# Patient Record
Sex: FEMALE | Race: WHITE | NOT HISPANIC OR LATINO | Employment: FULL TIME | URBAN - METROPOLITAN AREA
[De-identification: names, ages, dates, MRNs, and addresses within clinical notes are randomized per-mention and may not be internally consistent; named-entity substitution may affect disease eponyms.]

---

## 2021-01-01 ENCOUNTER — TRANSFERRED RECORDS (OUTPATIENT)
Dept: MULTI SPECIALTY CLINIC | Facility: CLINIC | Age: 46
End: 2021-01-01

## 2021-11-09 ENCOUNTER — OFFICE VISIT (OUTPATIENT)
Dept: FAMILY MEDICINE | Facility: CLINIC | Age: 46
End: 2021-11-09
Payer: COMMERCIAL

## 2021-11-09 VITALS
SYSTOLIC BLOOD PRESSURE: 122 MMHG | HEIGHT: 66 IN | DIASTOLIC BLOOD PRESSURE: 64 MMHG | HEART RATE: 76 BPM | TEMPERATURE: 98 F | OXYGEN SATURATION: 98 % | BODY MASS INDEX: 37.56 KG/M2 | WEIGHT: 233.69 LBS

## 2021-11-09 DIAGNOSIS — E66.9 OBESITY (BMI 35.0-39.9 WITHOUT COMORBIDITY): ICD-10-CM

## 2021-11-09 DIAGNOSIS — Z00.00 WELLNESS EXAMINATION: Primary | ICD-10-CM

## 2021-11-09 DIAGNOSIS — Z80.0 FAMILY HISTORY OF COLON CANCER: ICD-10-CM

## 2021-11-09 DIAGNOSIS — Z11.59 ENCOUNTER FOR HEPATITIS C SCREENING TEST FOR LOW RISK PATIENT: ICD-10-CM

## 2021-11-09 DIAGNOSIS — F33.0 MAJOR DEPRESSIVE DISORDER, RECURRENT EPISODE, MILD: ICD-10-CM

## 2021-11-09 DIAGNOSIS — Z85.828 HISTORY OF BASAL CELL CANCER: ICD-10-CM

## 2021-11-09 DIAGNOSIS — R06.81 WITNESSED EPISODE OF APNEA: ICD-10-CM

## 2021-11-09 DIAGNOSIS — Z11.4 ENCOUNTER FOR SCREENING FOR HIV: ICD-10-CM

## 2021-11-09 DIAGNOSIS — Z12.31 ENCOUNTER FOR SCREENING MAMMOGRAM FOR MALIGNANT NEOPLASM OF BREAST: ICD-10-CM

## 2021-11-09 PROCEDURE — 99999 PR PBB SHADOW E&M-EST. PATIENT-LVL IV: ICD-10-PCS | Mod: PBBFAC,,, | Performed by: INTERNAL MEDICINE

## 2021-11-09 PROCEDURE — 99386 PREV VISIT NEW AGE 40-64: CPT | Mod: S$GLB,,, | Performed by: INTERNAL MEDICINE

## 2021-11-09 PROCEDURE — 99386 PR PREVENTIVE VISIT,NEW,40-64: ICD-10-PCS | Mod: S$GLB,,, | Performed by: INTERNAL MEDICINE

## 2021-11-09 PROCEDURE — 99999 PR PBB SHADOW E&M-EST. PATIENT-LVL IV: CPT | Mod: PBBFAC,,, | Performed by: INTERNAL MEDICINE

## 2021-11-09 RX ORDER — ESCITALOPRAM OXALATE 20 MG/1
20 TABLET ORAL DAILY
Qty: 90 TABLET | Refills: 1 | Status: SHIPPED | OUTPATIENT
Start: 2021-11-09 | End: 2022-05-07

## 2021-11-09 RX ORDER — ESCITALOPRAM OXALATE 20 MG/1
1 TABLET ORAL DAILY
COMMUNITY
End: 2021-11-09 | Stop reason: SDUPTHER

## 2021-11-16 ENCOUNTER — OFFICE VISIT (OUTPATIENT)
Dept: DERMATOLOGY | Facility: CLINIC | Age: 46
End: 2021-11-16
Payer: COMMERCIAL

## 2021-11-16 VITALS — BODY MASS INDEX: 37.61 KG/M2 | WEIGHT: 233 LBS

## 2021-11-16 DIAGNOSIS — D22.9 NEVUS OF MULTIPLE SITES: ICD-10-CM

## 2021-11-16 DIAGNOSIS — L81.4 LENTIGINES: ICD-10-CM

## 2021-11-16 DIAGNOSIS — L82.1 STUCCO KERATOSES: Primary | ICD-10-CM

## 2021-11-16 DIAGNOSIS — Z85.828 HISTORY OF BASAL CELL CANCER: ICD-10-CM

## 2021-11-16 DIAGNOSIS — L71.9 ROSACEA: ICD-10-CM

## 2021-11-16 PROCEDURE — 99999 PR PBB SHADOW E&M-EST. PATIENT-LVL III: CPT | Mod: PBBFAC,,, | Performed by: DERMATOLOGY

## 2021-11-16 PROCEDURE — 99999 PR PBB SHADOW E&M-EST. PATIENT-LVL III: ICD-10-PCS | Mod: PBBFAC,,, | Performed by: DERMATOLOGY

## 2021-11-16 PROCEDURE — 99203 PR OFFICE/OUTPT VISIT, NEW, LEVL III, 30-44 MIN: ICD-10-PCS | Mod: S$GLB,,, | Performed by: DERMATOLOGY

## 2021-11-16 PROCEDURE — 99203 OFFICE O/P NEW LOW 30 MIN: CPT | Mod: S$GLB,,, | Performed by: DERMATOLOGY

## 2021-11-16 RX ORDER — METRONIDAZOLE 7.5 MG/G
GEL TOPICAL
Qty: 45 G | Refills: 3 | Status: SHIPPED | OUTPATIENT
Start: 2021-11-16 | End: 2022-09-16 | Stop reason: ALTCHOICE

## 2021-11-16 RX ORDER — AMMONIUM LACTATE 12 G/100G
LOTION TOPICAL
Qty: 225 G | Refills: 3 | Status: SHIPPED | OUTPATIENT
Start: 2021-11-16 | End: 2023-01-17

## 2021-11-24 ENCOUNTER — PATIENT MESSAGE (OUTPATIENT)
Dept: GASTROENTEROLOGY | Facility: CLINIC | Age: 46
End: 2021-11-24
Payer: COMMERCIAL

## 2021-11-24 ENCOUNTER — TELEPHONE (OUTPATIENT)
Dept: GASTROENTEROLOGY | Facility: CLINIC | Age: 46
End: 2021-11-24
Payer: COMMERCIAL

## 2021-11-24 DIAGNOSIS — Z01.818 PREOP EXAMINATION: Primary | ICD-10-CM

## 2021-11-24 RX ORDER — SODIUM PICOSULFATE, MAGNESIUM OXIDE, AND ANHYDROUS CITRIC ACID 10; 3.5; 12 MG/160ML; G/160ML; G/160ML
LIQUID ORAL
Qty: 1 EACH | Refills: 0 | Status: SHIPPED | OUTPATIENT
Start: 2021-11-24 | End: 2022-08-22

## 2021-12-14 ENCOUNTER — TELEPHONE (OUTPATIENT)
Dept: GASTROENTEROLOGY | Facility: CLINIC | Age: 46
End: 2021-12-14
Payer: COMMERCIAL

## 2021-12-27 ENCOUNTER — OFFICE VISIT (OUTPATIENT)
Dept: SLEEP MEDICINE | Facility: CLINIC | Age: 46
End: 2021-12-27
Payer: COMMERCIAL

## 2021-12-27 VITALS
WEIGHT: 237.81 LBS | HEIGHT: 66 IN | SYSTOLIC BLOOD PRESSURE: 148 MMHG | BODY MASS INDEX: 38.22 KG/M2 | HEART RATE: 60 BPM | DIASTOLIC BLOOD PRESSURE: 82 MMHG

## 2021-12-27 DIAGNOSIS — G47.30 SLEEP APNEA, UNSPECIFIED TYPE: Primary | ICD-10-CM

## 2021-12-27 DIAGNOSIS — R06.81 WITNESSED EPISODE OF APNEA: ICD-10-CM

## 2021-12-27 PROCEDURE — 99204 PR OFFICE/OUTPT VISIT, NEW, LEVL IV, 45-59 MIN: ICD-10-PCS | Mod: S$GLB,,, | Performed by: PSYCHIATRY & NEUROLOGY

## 2021-12-27 PROCEDURE — 99999 PR PBB SHADOW E&M-EST. PATIENT-LVL III: ICD-10-PCS | Mod: PBBFAC,,, | Performed by: PSYCHIATRY & NEUROLOGY

## 2021-12-27 PROCEDURE — 99999 PR PBB SHADOW E&M-EST. PATIENT-LVL III: CPT | Mod: PBBFAC,,, | Performed by: PSYCHIATRY & NEUROLOGY

## 2021-12-27 PROCEDURE — 99204 OFFICE O/P NEW MOD 45 MIN: CPT | Mod: S$GLB,,, | Performed by: PSYCHIATRY & NEUROLOGY

## 2022-01-01 ENCOUNTER — TRANSFERRED RECORDS (OUTPATIENT)
Dept: MULTI SPECIALTY CLINIC | Facility: CLINIC | Age: 47
End: 2022-01-01

## 2022-01-10 ENCOUNTER — PATIENT MESSAGE (OUTPATIENT)
Dept: ADMINISTRATIVE | Facility: HOSPITAL | Age: 47
End: 2022-01-10
Payer: COMMERCIAL

## 2022-01-12 ENCOUNTER — PATIENT MESSAGE (OUTPATIENT)
Dept: SLEEP MEDICINE | Facility: CLINIC | Age: 47
End: 2022-01-12
Payer: COMMERCIAL

## 2022-01-13 ENCOUNTER — OFFICE VISIT (OUTPATIENT)
Dept: OBSTETRICS AND GYNECOLOGY | Facility: CLINIC | Age: 47
End: 2022-01-13
Payer: COMMERCIAL

## 2022-01-13 VITALS
SYSTOLIC BLOOD PRESSURE: 126 MMHG | DIASTOLIC BLOOD PRESSURE: 72 MMHG | BODY MASS INDEX: 38.86 KG/M2 | WEIGHT: 240.75 LBS

## 2022-01-13 DIAGNOSIS — Z12.4 SCREENING FOR CERVICAL CANCER: ICD-10-CM

## 2022-01-13 DIAGNOSIS — Z01.419 WELL WOMAN EXAM WITH ROUTINE GYNECOLOGICAL EXAM: Primary | ICD-10-CM

## 2022-01-13 PROCEDURE — 88175 CYTOPATH C/V AUTO FLUID REDO: CPT | Performed by: OBSTETRICS & GYNECOLOGY

## 2022-01-13 PROCEDURE — 99999 PR PBB SHADOW E&M-EST. PATIENT-LVL III: ICD-10-PCS | Mod: PBBFAC,,, | Performed by: OBSTETRICS & GYNECOLOGY

## 2022-01-13 PROCEDURE — 99999 PR PBB SHADOW E&M-EST. PATIENT-LVL III: CPT | Mod: PBBFAC,,, | Performed by: OBSTETRICS & GYNECOLOGY

## 2022-01-13 PROCEDURE — 99386 PREV VISIT NEW AGE 40-64: CPT | Mod: S$GLB,,, | Performed by: OBSTETRICS & GYNECOLOGY

## 2022-01-13 PROCEDURE — 99386 PR PREVENTIVE VISIT,NEW,40-64: ICD-10-PCS | Mod: S$GLB,,, | Performed by: OBSTETRICS & GYNECOLOGY

## 2022-01-13 NOTE — PROGRESS NOTES
GYNECOLOGY OFFICE NOTE    Reason for visit: annual    HPI: Pt is a 46 y.o.  female  who presents for annual. Cycle: menarche- 9,. Pt with hx of RA- supracervical hysterectomy/BS/sacrrocolpoplexy secondary uterine fibroids and prolapse.  She is sexually active. She does not desire STI screening. She denies vaginal discharge.  Last pap: , denies hx of abnormal. Last MMG 2022.     Past Medical History:   Diagnosis Date    Anxiety     Basal cell carcinoma        Past Surgical History:   Procedure Laterality Date    LUMBAR DISCECTOMY  2013    Robotic TLH/BS/sacrocolplexy  2020    [place]       Family History   Problem Relation Age of Onset    Colon cancer Mother     Diabetes Father     Breast cancer Paternal Grandmother     Ovarian cancer Neg Hx        Social History     Tobacco Use    Smoking status: Never Smoker    Smokeless tobacco: Never Used   Substance Use Topics    Alcohol use: Never    Drug use: Never       OB History    Para Term  AB Living   0 0 0 0 0 0   SAB IAB Ectopic Multiple Live Births   0 0 0 0 0       Current Outpatient Medications   Medication Sig    ammonium lactate (LAC-HYDRIN) 12 % lotion Apply topically as needed for Dry Skin.    EScitalopram oxalate (LEXAPRO) 20 MG tablet Take 1 tablet (20 mg total) by mouth once daily.    metroNIDAZOLE (METROGEL) 0.75 % gel Use hs on face    sod picosulf-mag ox-citric ac (CLENPIQ) 10 mg-3.5 gram -12 gram/160 mL Soln Use as directed     No current facility-administered medications for this visit.       Allergies: Iodinated contrast media     /72   Wt 109.2 kg (240 lb 11.9 oz)   BMI 38.86 kg/m²     ROS:  GENERAL: Denies fever or chills.   SKIN: Denies rash or lesions.   HEAD: Denies head injury or headache.   CHEST: Denies chest pain or shortness of breath.   CARDIOVASCULAR: Denies palpitations or chest pain.   ABDOMEN: No constipation, diarrhea, nausea, vomiting or rectal bleeding.   URINARY: No  dysuria, hematuria, or burning on urination.  REPRODUCTIVE: See HPI.   BREASTS: see HPI  NEUROLOGIC: Denies syncope or weakness.     Physical Exam:  GENERAL: alert, appears stated age and cooperative  NEUROLOGIC: orientated to person, place and time, normal mood and affect   CHEST: Normal respiratory effort  NECK: normal appearance  SKIN: no acne, hirsutism  BREAST EXAM: breasts appear normal, no suspicious masses, no skin or nipple changes or axillary nodes  ABDOMEN: abdomen is soft without significant tenderness, masses  EXTERNAL GENITALIA:  normal general appearance  URETHRA: normal urethra, normal urethral meatus  VAGINA:  normal mucosa, no  lesions  CERVIX:  Normal  UTERUS:  Surgically absent  ADNEXA: nontender    Diagnosis:  1. Well woman exam with routine gynecological exam    2. Screening for cervical cancer        Plan:   1. Annual  2. Pap today    Orders Placed This Encounter    Liquid-Based Pap Smear, Screening       Luisa Mcmullen MD  OB/GYN

## 2022-01-20 LAB
FINAL PATHOLOGIC DIAGNOSIS: NORMAL
Lab: NORMAL

## 2022-02-14 PROBLEM — Z00.00 WELLNESS EXAMINATION: Status: RESOLVED | Noted: 2021-11-09 | Resolved: 2022-02-14

## 2022-05-07 RX ORDER — ESCITALOPRAM OXALATE 20 MG/1
TABLET ORAL
Qty: 90 TABLET | Refills: 1 | Status: SHIPPED | OUTPATIENT
Start: 2022-05-07 | End: 2022-11-16

## 2022-05-07 NOTE — TELEPHONE ENCOUNTER
No new care gaps identified.  [facility] Embedded Care Gaps. Reference number: [id number]. 5/07/2022   8:33:20 AM CDT

## 2022-05-07 NOTE — TELEPHONE ENCOUNTER
Refill Authorization Note   Tiara Fritz  is requesting a refill authorization.  Brief Assessment and Rationale for Refill:  Approve     Medication Therapy Plan:       Medication Reconciliation Completed: No   Comments:     No Care Gaps recommended.     Note composed:5:32 PM 05/07/2022

## 2022-08-22 ENCOUNTER — TELEPHONE (OUTPATIENT)
Dept: GASTROENTEROLOGY | Facility: CLINIC | Age: 47
End: 2022-08-22
Payer: COMMERCIAL

## 2022-08-22 ENCOUNTER — PATIENT MESSAGE (OUTPATIENT)
Dept: GASTROENTEROLOGY | Facility: CLINIC | Age: 47
End: 2022-08-22
Payer: COMMERCIAL

## 2022-08-22 RX ORDER — SODIUM, POTASSIUM,MAG SULFATES 17.5-3.13G
1 SOLUTION, RECONSTITUTED, ORAL ORAL DAILY
Qty: 1 KIT | Refills: 0 | Status: SHIPPED | OUTPATIENT
Start: 2022-08-22 | End: 2022-08-24

## 2022-08-22 NOTE — TELEPHONE ENCOUNTER
Patient will do at home covid test.      Referring Physician: Dr. Charley Roy                             Date: 8/22/2022    Reason for Referral: Family history of colon cancer and colon polyps      Family History of:   Colon polyp: Yes  Relationship/Age of Onset: Brother/ 46      Colon cancer: Yes  Relationship/Age of Onset: Mother/ 60      Patient with:   Hemoccults Done:       Iron deficient:   No      On Blood Thinner: No      Valvular heart disease/valve replacement: No      Anemia Present: No      On NSAID: No    On Adipex or phentermine: No      Lung disease: No      Kidney disease: No      Hx of polyps: No      Hx of colon cancer: No      Previous colon evalations: Yes  When: 2011  Where: [place]  Pertinent symptoms:           Review of patient's allergies indicates: Iodinated contrast media        Patient was scheduled for colonoscopy on 9/22/2022       with Dr. San at [facility].       instructions were reviewed with patient.       Prep sent to Tavo Welch in [facility] Instructions    You are scheduled for a colonoscopy with Dr. San on 9/22/2022 at [facility]. Enter through the [facility] and check in at [facility] Day [facility].  To ensure that your test is accurate and complete, you MUST follow these instructions listed below.  If you have any questions, please call our office at [phone].  Plan on being at the hospital for your procedure for 3-4 hours.    1.  Follow a CLEAR LIQUID DIET for the entire day before your scheduled colonoscopy.  This means no solid food the entire day starting when you wake.  You may have as much of the clear liquids as you want throughout the day.   CLEAR LIQUID DIET:   - Avoid Red, Orange, Purple, and/or Blue food coloring   - NO DAIRY   - You can have:  Coffee with sugar (no creamer), tea, water, soda, apple or white grape juice, chicken or beef broth/bouillon (no meat, noodles, or veggies), green/yellow popsicles, green/yellow  cony Miller.    2.  AT 5 pm the evening before your colonoscopy, POUR ONE (1) BOTTLE OF SUPREP INTO THE MIXING CONTAINER, PROVIDED INSIDE THE BOX.  ADD WATER TO THE LINE ON THE CONTAINER AND MIX IT WELL.  DRINK THE ENTIRE CONTAINER AND THEN DRINK TWO (2) MORE CONTAINERS OF WATER OVER THE NEXT 1 HOUR.  This is sometimes easier to drink if this solution is cold, so you can mix the solution 20 minutes ahead of time and place in the refrigerator prior to drinking.  You have to drink the solution within 30-45 minutes of mixing it.  Do NOT put this solution over ice.  It IS ok to drink with a straw.    3.  The endoscopy department will call you 1 day before your colonoscopy to tell you the exact time to arrive, AND to tell you the exact time to drink the 2nd portion of your prep (which will be FIVE HOURS BEFORE YOUR ARRIVAL TIME).  At this time given to you, POUR ONE (1) BOTTLE OF SUPREP INTO THE MIXING CONTAINER, PROVIDED INSIDE THE BOX.  ADD WATER TO THE LINE ON THE CONTAINER AND MIX IT WELL.  DRINK THE ENTIRE CONTAINER AND THEN DRINK TWO (2) MORE CONTAINERS OF WATER OVER THE NEXT 1 HOUR.  This is sometimes easier to drink if this solution is cold, so you can mix the solution 20 minutes ahead of time and place in the refrigerator prior to drinking.  You have to drink the solution within 30-45 minutes of mixing it.  Do NOT put this solution over ice.  It IS ok to drink with a straw.  Once this is complete, you may not have ANYTHING else by mouth!    4.  You must have someone with you to DRIVE YOU HOME since you will be receiving IV sedation for the colonoscopy.    5.  It is ok to take MOST of your REGULAR MEDICATIONS  in the morning of your test with a SIP of water.  THE ONLY MEDS YOU NEED TO HOLD ARE YOUR DIABETES MEDICATIONS,  SOME BLOOD PRESSURE MEDS, AND BLOOD THINNERS IF OK'D BY YOUR DOCTOR.  Do NOT have anything else to eat or drink the morning of your colonoscopy.  It is ok to brush your teeth.    6.  If you  are on blood thinners THAT YOU HAVE BEEN INSTRUCTED TO HOLD BY YOUR DOCTOR FOR THIS PROCEDURE, then do NOT take this the morning of your colonoscopy.  Do NOT stop these medications on your own, they must be approved to be held by your doctor.  Your colonoscopy can NOT be done if you are on these medications.  Examples of blood thinners include: Coumadin, Aggrenox, Plavix, Pradaxa, Reapro, Pletal, Xarelto, Ticagrelor, Brilinta, Eliquis, and high dose aspirin (325 mg).  You do not have to stop baby aspirin 81 mg.    7.  IF YOU ARE DIABETIC:  NO INSULIN OR ORAL MEDICATIONS THE MORNING OF THE COLONOSCOPY.  TAKE ONLY HALF THE DOSE OF YOUR INSULIN THE DAY BEFORE THE COLONOSCOPY.  DO NOT TAKE ANY ORAL DIABETIC MEDICATIONS THE DAY BEFORE THE COLONOSCOPY.  IF YOU ARE AN INSULIN DEPENDENT DIABETIC WITH UNSTABLE BLOOD SUGARS, NOTIFY YOUR PRIMARY CARE PHYSICIAN FOR INSTRUCTIONS.

## 2022-09-21 ENCOUNTER — PATIENT MESSAGE (OUTPATIENT)
Dept: GASTROENTEROLOGY | Facility: CLINIC | Age: 47
End: 2022-09-21
Payer: COMMERCIAL

## 2022-09-21 RX ORDER — POLYETHYLENE GLYCOL 3350, SODIUM SULFATE ANHYDROUS, SODIUM BICARBONATE, SODIUM CHLORIDE, POTASSIUM CHLORIDE 236; 22.74; 6.74; 5.86; 2.97 G/4L; G/4L; G/4L; G/4L; G/4L
4 POWDER, FOR SOLUTION ORAL ONCE
Qty: 1 EACH | Refills: 0 | Status: ON HOLD | OUTPATIENT
Start: 2022-09-21 | End: 2022-09-22 | Stop reason: HOSPADM

## 2022-09-21 NOTE — TELEPHONE ENCOUNTER
----- Message from Mei Shah sent at 9/21/2022 10:37 AM CDT -----  Type:  RX Refill Request    Who Called: Pt  Refill or New Rx: new  RX Name and Strength:sodium,potassium,mag sulfates (SUPREP BOWEL PREP KIT) 17.5-3.13-1.6 gram SolR  How is the patient currently taking it? (ex. 1XDay):1day  Is this a 30 day or 90 day RX:n/a  Preferred Pharmacy with phone number:[id number] [place] [place] [facility] [address], [address]   Phone: [phone]  Fax:  [phone]        Local or Mail Order: :Local  Ordering Provider:Ashlie San  Would the patient rather a call back or a response via [facility]? call  Best Call Back Number:[phone]  Additional Information: Pt Is requesting that Rx be Changed to Golytely  Generic Pt is At Pharmacy now Pt is out of pocket pay and the requesting brand is $70 dollars less

## 2022-11-15 ENCOUNTER — TELEPHONE (OUTPATIENT)
Dept: FAMILY MEDICINE | Facility: CLINIC | Age: 47
End: 2022-11-15
Payer: COMMERCIAL

## 2022-11-15 DIAGNOSIS — Z00.00 WELLNESS EXAMINATION: ICD-10-CM

## 2022-11-15 DIAGNOSIS — Z13.0 SCREENING FOR BLOOD DISEASE: ICD-10-CM

## 2022-11-15 DIAGNOSIS — F33.0 MAJOR DEPRESSIVE DISORDER, RECURRENT EPISODE, MILD: Primary | ICD-10-CM

## 2022-11-15 DIAGNOSIS — Z13.1 SCREENING FOR DIABETES MELLITUS: ICD-10-CM

## 2022-11-15 NOTE — TELEPHONE ENCOUNTER
----- Message from Demetria Kumar sent at 11/15/2022  3:46 PM CST -----  Type:  Patient Returning Call    Who Called: Pt.  Who Left Message for Patient: Our office  Does the patient know what this is regarding?:appointment  Would the patient rather a call back or a response via [facility] [employer] [facility]? Call back  Best Call Back Number:[phone]  Additional Information:

## 2022-11-15 NOTE — TELEPHONE ENCOUNTER
----- Message from Christal Cluadio sent at 11/15/2022  2:08 PM CST -----  Regarding: call back  Contact: [phone]  Type:  Sooner Apoointment Request    Caller is requesting a sooner appointment.  Caller declined first available appointment listed below.  Caller will not accept being placed on the waitlist and is requesting a message be sent to doctor.  Name of Caller: New Patient to Dr. Rodriguez   When is the first available appointment? Was asked to send a message   Symptoms: Establish Care   Would the patient rather a call back or a response via [facility]? Call back   Best Call Back Number: [phone]  Additional Information:

## 2022-11-23 ENCOUNTER — TELEPHONE (OUTPATIENT)
Dept: FAMILY MEDICINE | Facility: CLINIC | Age: 47
End: 2022-11-23
Payer: COMMERCIAL

## 2022-11-23 NOTE — TELEPHONE ENCOUNTER
Called and spoke with pt in regards of her wanting to reschedule her appt in January. Pt reschedule her appt for 1/17/2023 for 8:40 am instead. Informed pt that appt would be made Friday, pt verbalized understanding of message.

## 2022-11-23 NOTE — TELEPHONE ENCOUNTER
----- Message from Candace Perez sent at 11/23/2022  3:47 PM CST -----   Pt send a message through [email or web address]. Pt would like to reschedule her appt for the 3rd week in January. Any time between Jan 16-Jan 20, 2023 (if the Dr is available that week). If not, then for any time in February 2023.          Pt can be reached at [phone]          TY

## 2022-12-05 ENCOUNTER — TELEPHONE (OUTPATIENT)
Dept: FAMILY MEDICINE | Facility: CLINIC | Age: 47
End: 2022-12-05
Payer: COMMERCIAL

## 2022-12-05 NOTE — TELEPHONE ENCOUNTER
----- Message from Cornelia Paige sent at 12/5/2022  1:20 PM CST -----  Regarding: reschedule  Contact: [phone]  Patient is requesting a call back regarding having her appt changed to 01/17.   Would the patient rather a call back or a response via [facility]?  Call   Best Call Back Number:   [phone]   Additional Information:  this was discuss on the day before Bobbi and Yuridia stated she would move the appt. Thanks

## 2023-01-17 ENCOUNTER — OFFICE VISIT (OUTPATIENT)
Dept: FAMILY MEDICINE | Facility: CLINIC | Age: 48
End: 2023-01-17
Payer: COMMERCIAL

## 2023-01-17 VITALS
TEMPERATURE: 97 F | SYSTOLIC BLOOD PRESSURE: 116 MMHG | HEART RATE: 65 BPM | WEIGHT: 218.31 LBS | OXYGEN SATURATION: 97 % | DIASTOLIC BLOOD PRESSURE: 70 MMHG | BODY MASS INDEX: 35.08 KG/M2 | HEIGHT: 66 IN

## 2023-01-17 DIAGNOSIS — Z80.0 FAMILY HISTORY OF COLON CANCER: ICD-10-CM

## 2023-01-17 DIAGNOSIS — Z00.00 WELLNESS EXAMINATION: ICD-10-CM

## 2023-01-17 DIAGNOSIS — D53.1 MEGALOBLASTIC ANEMIA: ICD-10-CM

## 2023-01-17 DIAGNOSIS — Z12.31 ENCOUNTER FOR SCREENING MAMMOGRAM FOR MALIGNANT NEOPLASM OF BREAST: ICD-10-CM

## 2023-01-17 DIAGNOSIS — F33.0 MAJOR DEPRESSIVE DISORDER, RECURRENT EPISODE, MILD: ICD-10-CM

## 2023-01-17 DIAGNOSIS — Z85.828 HISTORY OF BASAL CELL CANCER: ICD-10-CM

## 2023-01-17 DIAGNOSIS — E66.09 CLASS 2 OBESITY DUE TO EXCESS CALORIES WITHOUT SERIOUS COMORBIDITY WITH BODY MASS INDEX (BMI) OF 35.0 TO 35.9 IN ADULT: ICD-10-CM

## 2023-01-17 DIAGNOSIS — Z76.89 ENCOUNTER TO ESTABLISH CARE WITH NEW DOCTOR: Primary | ICD-10-CM

## 2023-01-17 PROBLEM — R06.81 WITNESSED EPISODE OF APNEA: Status: RESOLVED | Noted: 2021-11-09 | Resolved: 2023-01-17

## 2023-01-17 PROBLEM — E66.812 CLASS 2 OBESITY DUE TO EXCESS CALORIES WITHOUT SERIOUS COMORBIDITY WITH BODY MASS INDEX (BMI) OF 35.0 TO 35.9 IN ADULT: Status: ACTIVE | Noted: 2023-01-17

## 2023-01-17 PROCEDURE — 99999 PR PBB SHADOW E&M-EST. PATIENT-LVL III: ICD-10-PCS | Mod: PBBFAC,,, | Performed by: STUDENT IN AN ORGANIZED HEALTH CARE EDUCATION/TRAINING PROGRAM

## 2023-01-17 PROCEDURE — 99396 PR PREVENTIVE VISIT,EST,40-64: ICD-10-PCS | Mod: S$GLB,,, | Performed by: STUDENT IN AN ORGANIZED HEALTH CARE EDUCATION/TRAINING PROGRAM

## 2023-01-17 PROCEDURE — 99396 PREV VISIT EST AGE 40-64: CPT | Mod: S$GLB,,, | Performed by: STUDENT IN AN ORGANIZED HEALTH CARE EDUCATION/TRAINING PROGRAM

## 2023-01-17 PROCEDURE — 99999 PR PBB SHADOW E&M-EST. PATIENT-LVL III: CPT | Mod: PBBFAC,,, | Performed by: STUDENT IN AN ORGANIZED HEALTH CARE EDUCATION/TRAINING PROGRAM

## 2023-01-17 RX ORDER — ESCITALOPRAM OXALATE 20 MG/1
20 TABLET ORAL DAILY
Qty: 90 TABLET | Refills: 3 | Status: SHIPPED | OUTPATIENT
Start: 2023-01-17

## 2023-01-17 NOTE — PROGRESS NOTES
Subjective:       Patient ID: Tiara rFitz is a 47 y.o. female.    Chief Complaint: Establish Care (Pt here to [facility] , former pt of Dr. Roy )    Patient Active Problem List    Diagnosis Date Noted    Class 2 obesity due to excess calories without serious comorbidity with body mass index (BMI) of 35.0 to 35.9 in adult 01/17/2023     Continues to work on diet and exercise  Started with exercise in Nov and did well but with holidays and family kind of fell off of habit but is getting back into it  Not interested in medications right now wants to try and work on things herself first       Major depressive disorder, recurrent episode, mild 11/09/2021     stable  lexapro 20  Years   Needs refills       History of basal cell cancer 11/09/2021     Several   Sees derm yearly skin checks; Dr Barnes         Family history of colon cancer 11/09/2021     Mother   Dx 59  Colonoscopy this past summer; no polyps   q 5 years advised           Review of Systems   All other systems reviewed and are negative.     A1C:  Recent Labs   Lab 11/09/21  1022 01/09/23  0740   Hemoglobin A1C 5.5 5.4     CBC:  Recent Labs   Lab 11/09/21  1022 01/09/23  0740   WBC 9.55 8.92   RBC 4.89 4.75   Hemoglobin 15.6 15.6   Hematocrit 47.4 46.9   Platelets 318 301   MCV 97 99 H   MCH 31.9 H 32.8 H   MCHC 32.9 33.3     CMP:  Recent Labs   Lab 11/09/21  1022 01/09/23  0740   Glucose 104 96   Calcium 10.1 9.0   Albumin 5.0 4.4   Total Protein 7.9 7.4   Sodium 141 141   Potassium 5.1 4.4   CO2 27 29   Chloride 104 105   BUN 13 13   Creatinine 0.76 0.62   Alkaline Phosphatase 57 66   ALT 46 H 30   AST 43 31   Total Bilirubin 0.4 0.5     LIPIDS:  Recent Labs   Lab 11/09/21  1022 01/09/23  0740   TSH  --  1.210   HDL 43 43   Cholesterol 162 176   Triglycerides 166 H 113   LDL Cholesterol 85.8 110.4   HDL/Cholesterol Ratio 26.5 24.4   Non-HDL Cholesterol 119 133   Total Cholesterol/HDL Ratio 3.8 4.1     TSH:  Recent Labs   Lab 01/09/23  0740   TSH  occasional palpitations 1.210        Objective:      Vitals:    01/17/23 0821   BP: 116/70   Pulse: 65   Temp: 97.3 °F (36.3 °C)      Physical Exam  Vitals reviewed.   Constitutional:       Appearance: Normal appearance. She is obese.   HENT:      Head: Normocephalic and atraumatic.   Eyes:      Conjunctiva/sclera: Conjunctivae normal.   Cardiovascular:      Rate and Rhythm: Normal rate and regular rhythm.      Heart sounds: Normal heart sounds.   Pulmonary:      Effort: Pulmonary effort is normal.      Breath sounds: Normal breath sounds.   Abdominal:      Palpations: Abdomen is soft.      Tenderness: There is no abdominal tenderness.   Musculoskeletal:         General: Normal range of motion.      Cervical back: Normal range of motion.      Right lower leg: No edema.      Left lower leg: No edema.   Neurological:      General: No focal deficit present.      Mental Status: She is alert and oriented to person, place, and time.   Psychiatric:         Mood and Affect: Mood normal.         Behavior: Behavior normal.        Assessment:       1. Encounter to establish care with new doctor    2. Wellness examination    3. Major depressive disorder, recurrent episode, mild    4. Family history of colon cancer    5. Encounter for screening mammogram for malignant neoplasm of breast    6. History of basal cell cancer    7. Megaloblastic anemia    8. Class 2 obesity due to excess calories without serious comorbidity with body mass index (BMI) of 35.0 to 35.9 in adult          Plan:   1. Encounter to establish care with new doctor    2. Wellness examination    3. Major depressive disorder, recurrent episode, mild  - EScitalopram oxalate (LEXAPRO) 20 MG tablet; Take 1 tablet (20 mg total) by mouth once daily.  Dispense: 90 tablet; Refill: 3    4. Family history of colon cancer    5. Encounter for screening mammogram for malignant neoplasm of breast  - Mammo Digital Screening Bilat w/ Alphonse; Future    6. History of basal cell cancer    7. Megaloblastic  anemia  - VITAMIN B12; Future    8. Class 2 obesity due to excess calories without serious comorbidity with body mass index (BMI) of 35.0 to 35.9 in adult  - VITAMIN B12; Future     Establish care; well woman  Labs reviewed  HM discussed: mammo ordered   Continue healthy lifestyle efforts  Continue current meds as prescribed  Keep routine specialist f/u   RTC in 1 year and/or PRN          Candida ArguetaCaronde[facility]   1/17/23

## 2023-04-07 ENCOUNTER — OFFICE VISIT (OUTPATIENT)
Dept: URGENT CARE | Facility: CLINIC | Age: 48
End: 2023-04-07
Payer: COMMERCIAL

## 2023-04-07 VITALS
HEIGHT: 66 IN | SYSTOLIC BLOOD PRESSURE: 136 MMHG | BODY MASS INDEX: 36.96 KG/M2 | TEMPERATURE: 97 F | HEART RATE: 69 BPM | DIASTOLIC BLOOD PRESSURE: 78 MMHG | RESPIRATION RATE: 16 BRPM | WEIGHT: 230 LBS | OXYGEN SATURATION: 98 %

## 2023-04-07 DIAGNOSIS — H66.001 NON-RECURRENT ACUTE SUPPURATIVE OTITIS MEDIA OF RIGHT EAR WITHOUT SPONTANEOUS RUPTURE OF TYMPANIC MEMBRANE: Primary | ICD-10-CM

## 2023-04-07 PROCEDURE — 99212 PR OFFICE/OUTPT VISIT, EST, LEVL II, 10-19 MIN: ICD-10-PCS | Mod: S$GLB,,, | Performed by: NURSE PRACTITIONER

## 2023-04-07 PROCEDURE — 99212 OFFICE O/P EST SF 10 MIN: CPT | Mod: S$GLB,,, | Performed by: NURSE PRACTITIONER

## 2023-04-07 RX ORDER — AMOXICILLIN AND CLAVULANATE POTASSIUM 875; 125 MG/1; MG/1
1 TABLET, FILM COATED ORAL 2 TIMES DAILY
Qty: 20 TABLET | Refills: 0 | Status: SHIPPED | OUTPATIENT
Start: 2023-04-07 | End: 2023-04-17

## 2023-04-07 NOTE — PATIENT INSTRUCTIONS
Ear Infection:  Take full course of antibiotics as prescribed.  Avoid cleaning ears with any foreign objects; use over the counter Debrox as directed.   Tylenol or Motrin every 4 - 6 hours as needed for  ear pain.  Follow up with your PCP in 2-3, sooner for no improvement in symptoms  Follow up in the ER for any worsening of symptoms such as new fever, increasing ear pain, neck stiffness, etc.  If you smoke, stop smoking.        You must understand that you've received an Urgent Care treatment only and that you may be released before all your medical problems are known or treated. You, the patient, will arrange for follow up care as instructed.  Follow up with your PCP or specialty clinic as directed in the next 1-2 weeks if not improved or as needed.  You can call [phone] to schedule an appointment with the appropriate provider.  If your condition worsens we recommend that you receive another evaluation at the emergency room immediately or contact your primary medical clinics after hours call service to discuss your concerns.  Please return here or go to the Emergency Department for any concerns or worsening of condition.    If you were prescribed a narcotic or controlled medication, do not drive or operate heavy equipment or machinery while taking these medications.    Thank you for choosing [facility]!    Our goal in the Urgent Care is to always provide outstanding medical care. You may receive a survey by mail or e-mail in the next week regarding your experience today. We would greatly appreciate you completing and returning the survey. Your feedback provides us with a way to recognize our staff who provide very good care, and it helps us learn how to improve when your experience was below our aspiration of excellence.      We appreciate you trusting us with your medical care. We hope you feel better soon. We will be happy to take care of you for all of your future medical  needs.    Sincerely,    Khari Pineda, MILLICENT, FNP

## 2023-04-07 NOTE — PROGRESS NOTES
"Subjective:      Patient ID: Tiara Fritz is a 47 y.o. female.    Vitals:  height is 5' 6" (1.676 m) and weight is 104.3 kg (230 lb). Her oral temperature is 97.3 °F (36.3 °C). Her blood pressure is 136/78 and her pulse is 69. Her respiration is 16 and oxygen saturation is 98%.     Chief Complaint: Otalgia    47-year-old female presents with right ear pain.  She reports the pain started on yesterday.  She describes the pain as constant.  She denies tinnitus, hearing loss, fever or chills.  She denies a history of chronic otitis media or otitis externa.    Otalgia   There is pain in the right ear. This is a new problem. The current episode started yesterday. The problem occurs constantly. There has been no fever. The pain is at a severity of 6/10. The pain is moderate. Pertinent negatives include no diarrhea, ear discharge, hearing loss, neck pain, rash or sore throat. She has tried nothing for the symptoms. The treatment provided no relief. There is no history of a chronic ear infection or a tympanostomy tube.     Constitution: Negative for chills, fever and generalized weakness.   HENT:  Positive for ear pain. Negative for ear discharge, foreign body in ear, tinnitus, hearing loss, sinus pain, sinus pressure and sore throat.    Neck: Negative for neck pain.   Gastrointestinal:  Negative for diarrhea.   Skin:  Negative for rash.   Neurological:  Negative for dizziness and history of vertigo.    Objective:     Physical Exam   Constitutional: She is oriented to person, place, and time. She appears well-developed. She is cooperative.  Non-toxic appearance. She does not appear ill. No distress.   HENT:   Head: Normocephalic and atraumatic.   Ears:   Right Ear: Hearing normal. There is swelling and tenderness. No no drainage. There is mastoid tenderness. Tympanic membrane is erythematous. Tympanic membrane is not injected, not perforated and not bulging. No middle ear effusion. No decreased hearing is noted.   Left " Ear: Hearing, tympanic membrane, external ear and ear canal normal.   Nose: Nose normal. No mucosal edema, rhinorrhea, nasal deformity or congestion. No epistaxis. Right sinus exhibits no maxillary sinus tenderness and no frontal sinus tenderness. Left sinus exhibits no maxillary sinus tenderness and no frontal sinus tenderness.   Mouth/Throat: Uvula is midline, oropharynx is clear and moist and mucous membranes are normal. No trismus in the jaw. Normal dentition. No uvula swelling. No oropharyngeal exudate, posterior oropharyngeal edema or posterior oropharyngeal erythema.   Eyes: Conjunctivae and lids are normal. No scleral icterus.   Neck: Trachea normal and phonation normal. Neck supple. No edema present. No erythema present. No neck rigidity present.   Cardiovascular: Normal rate, regular rhythm, normal heart sounds and normal pulses.   Pulmonary/Chest: Effort normal and breath sounds normal. No respiratory distress. She has no decreased breath sounds. She has no rhonchi.   Abdominal: Normal appearance.   Musculoskeletal: Normal range of motion.         General: No deformity. Normal range of motion.   Neurological: She is alert and oriented to person, place, and time. She exhibits normal muscle tone. Coordination normal.   Skin: Skin is warm, dry, intact, not diaphoretic and not pale.   Psychiatric: Her speech is normal and behavior is normal. Judgment and thought content normal.   Nursing note and vitals reviewed.    Assessment:     1. Non-recurrent acute suppurative otitis media of right ear without spontaneous rupture of tympanic membrane        Plan:       Non-recurrent acute suppurative otitis media of right ear without spontaneous rupture of tympanic membrane  -     amoxicillin-clavulanate 875-125mg (AUGMENTIN) 875-125 mg per tablet; Take 1 tablet by mouth 2 (two) times daily. for 10 days  Dispense: 20 tablet; Refill: 0

## 2023-04-13 ENCOUNTER — TELEPHONE (OUTPATIENT)
Dept: FAMILY MEDICINE | Facility: CLINIC | Age: 48
End: 2023-04-13
Payer: COMMERCIAL

## 2023-04-13 NOTE — TELEPHONE ENCOUNTER
----- Message from Bart Ortiz sent at 4/13/2023  8:17 AM CDT -----  Contact: Pt  .Type:  Needs Medical Advice    Who Called: pt    Would the patient rather a call back or a response via [facility]?  Call back  Best Call Back Number: [phone]  Additional Information:  Pt. Recent had an  ear infection took medication but still have ear  pain would like further direction from her PCP.

## 2023-04-13 NOTE — TELEPHONE ENCOUNTER
Pt states that she went on Friday to [facility] and was dx with right Middle ear infection. (SEE [facility] NOTE FROM 04/12/2023) Pt states that she was given Amoxillin 825 mg to take 2x daily for 10 days. Pt is currently on day 6 of medication and states that her ear has not improved at all. Pt is still feeling the same symptoms. Pt would like to know what she needs to do next to have this resolved. Pt would like to know if she needs to be on another medication. Please advise.

## 2023-04-14 ENCOUNTER — OFFICE VISIT (OUTPATIENT)
Dept: FAMILY MEDICINE | Facility: CLINIC | Age: 48
End: 2023-04-14
Payer: COMMERCIAL

## 2023-04-14 VITALS
HEART RATE: 75 BPM | DIASTOLIC BLOOD PRESSURE: 66 MMHG | SYSTOLIC BLOOD PRESSURE: 124 MMHG | TEMPERATURE: 98 F | BODY MASS INDEX: 36.56 KG/M2 | HEIGHT: 66 IN | OXYGEN SATURATION: 99 % | WEIGHT: 227.5 LBS

## 2023-04-14 DIAGNOSIS — H66.001 NON-RECURRENT ACUTE SUPPURATIVE OTITIS MEDIA OF RIGHT EAR WITHOUT SPONTANEOUS RUPTURE OF TYMPANIC MEMBRANE: Primary | ICD-10-CM

## 2023-04-14 PROCEDURE — 99999 PR PBB SHADOW E&M-EST. PATIENT-LVL III: ICD-10-PCS | Mod: PBBFAC,,, | Performed by: PEDIATRICS

## 2023-04-14 PROCEDURE — 99214 OFFICE O/P EST MOD 30 MIN: CPT | Mod: S$GLB,,, | Performed by: PEDIATRICS

## 2023-04-14 PROCEDURE — 99999 PR PBB SHADOW E&M-EST. PATIENT-LVL III: CPT | Mod: PBBFAC,,, | Performed by: PEDIATRICS

## 2023-04-14 PROCEDURE — 99214 PR OFFICE/OUTPT VISIT, EST, LEVL IV, 30-39 MIN: ICD-10-PCS | Mod: S$GLB,,, | Performed by: PEDIATRICS

## 2023-04-14 RX ORDER — NEOMYCIN SULFATE, POLYMYXIN B SULFATE AND HYDROCORTISONE 10; 3.5; 1 MG/ML; MG/ML; [USP'U]/ML
3 SUSPENSION/ DROPS AURICULAR (OTIC) 3 TIMES DAILY
Qty: 10 ML | Refills: 0 | Status: SHIPPED | OUTPATIENT
Start: 2023-04-14 | End: 2023-04-24

## 2023-04-14 RX ORDER — PREDNISONE 20 MG/1
40 TABLET ORAL DAILY
Qty: 10 TABLET | Refills: 0 | Status: SHIPPED | OUTPATIENT
Start: 2023-04-14 | End: 2023-04-19

## 2023-04-14 RX ORDER — CIPROFLOXACIN AND DEXAMETHASONE 3; 1 MG/ML; MG/ML
4 SUSPENSION/ DROPS AURICULAR (OTIC) 2 TIMES DAILY
Qty: 7.5 ML | Refills: 0 | Status: SHIPPED | OUTPATIENT
Start: 2023-04-14

## 2023-04-14 RX ORDER — IBUPROFEN 600 MG/1
600 TABLET ORAL 3 TIMES DAILY
Qty: 30 TABLET | Refills: 0 | Status: SHIPPED | OUTPATIENT
Start: 2023-04-14 | End: 2023-04-24

## 2023-04-14 NOTE — PROGRESS NOTES
Subjective:      Patient ID: Tiara Fritz is a 47 y.o. female.    Chief Complaint: Otalgia (Right ear pain- still having pain. 3 day left of antibiotics )    Patient has been on 6 days of augmentin for ear infection. Reports her ear is still hurting about 4/10, and w/ manipulation and chewing it is much worse. Also pain is much worse w/ walking. Reports she has not had fever, body aches or chills. Admits she has been sore in her neck. Has been taking zyrtec and sudafed, neither of which have helped. Hearing is muffled a little. Reports pain is actually spreading to jaw and cheek, and even some above her eye.    Otalgia   Associated symptoms include headaches and a sore throat. Pertinent negatives include no coughing, diarrhea, rash, rhinorrhea or vomiting.   Review of Systems   Constitutional:  Negative for chills, fatigue and fever.   HENT:  Positive for ear pain, facial swelling and sore throat. Negative for congestion, postnasal drip, rhinorrhea, sinus pressure, sinus pain and sneezing.    Respiratory:  Negative for cough, chest tightness, shortness of breath and wheezing.    Cardiovascular:  Negative for chest pain and palpitations.   Gastrointestinal:  Negative for diarrhea, nausea and vomiting.   Genitourinary:  Negative for difficulty urinating.   Musculoskeletal:  Negative for arthralgias.   Skin:  Negative for rash.   Neurological:  Positive for headaches. Negative for dizziness.   Psychiatric/Behavioral:  Negative for confusion.       Current Outpatient Medications on File Prior to Visit   Medication Sig    amoxicillin-clavulanate 875-125mg (AUGMENTIN) 875-125 mg per tablet Take 1 tablet by mouth 2 (two) times daily. for 10 days    EScitalopram oxalate (LEXAPRO) 20 MG tablet Take 1 tablet (20 mg total) by mouth once daily.     No current facility-administered medications on file prior to visit.        Objective:     Vitals:    04/14/23 1519   BP: 124/66   Pulse: 75   Temp: 98.1 °F (36.7 °C)      Physical  Exam  Constitutional:       General: She is not in acute distress.     Appearance: Normal appearance.   HENT:      Head: Normocephalic and atraumatic.      Right Ear: External ear normal. Swelling and tenderness present. A middle ear effusion is present.      Left Ear: Tympanic membrane, ear canal and external ear normal.      Ears:      Comments: Erythema to right ear canal.     Nose: Nose normal.      Mouth/Throat:      Mouth: Mucous membranes are moist.      Pharynx: Oropharynx is clear.   Eyes:      Extraocular Movements: Extraocular movements intact.      Conjunctiva/sclera: Conjunctivae normal.      Pupils: Pupils are equal, round, and reactive to light.   Cardiovascular:      Rate and Rhythm: Normal rate and regular rhythm.      Pulses: Normal pulses.      Heart sounds: Normal heart sounds.   Pulmonary:      Effort: Pulmonary effort is normal. No respiratory distress.      Breath sounds: Normal breath sounds. No wheezing.   Abdominal:      General: Abdomen is flat.   Musculoskeletal:         General: No swelling. Normal range of motion.      Cervical back: Normal range of motion and neck supple.   Lymphadenopathy:      Cervical: Cervical adenopathy present.      Right cervical: Superficial cervical adenopathy present.      Left cervical: Superficial cervical adenopathy present.   Skin:     General: Skin is warm and dry.      Findings: No rash.   Neurological:      Mental Status: She is alert and oriented to person, place, and time.      Gait: Gait normal.   Psychiatric:         Mood and Affect: Mood normal.         Behavior: Behavior normal.      Assessment:         1. Non-recurrent acute suppurative otitis media of right ear without spontaneous rupture of tympanic membrane          Plan:   1. Non-recurrent acute suppurative otitis media of right ear without spontaneous rupture of tympanic membrane  - predniSONE (DELTASONE) 20 MG tablet; Take 2 tablets (40 mg total) by mouth once daily. for 5 days  Dispense:  10 tablet; Refill: 0  - ciprofloxacin-dexAMETHasone 0.3-0.1% (CIPRODEX) 0.3-0.1 % DrpS; Place 4 drops into both ears 2 (two) times daily.  Dispense: 7.5 mL; Refill: 0  - neomycin-polymyxin-hydrocortisone (CORTISPORIN) 3.5-10,000-1 mg/mL-unit/mL-% otic suspension; Place 3 drops into the right ear 3 (three) times daily. for 10 days  Dispense: 10 mL; Refill: 0  - ibuprofen (ADVIL,MOTRIN) 600 MG tablet; Take 1 tablet (600 mg total) by mouth 3 (three) times daily. for 10 days  Dispense: 30 tablet; Refill: 0         -keep ear clean and dry  -use drops as instructed  -complete entire antibiotic regimen  -use ibuprofen as instructed  -If not any better through weekend, will follow up with ENT    Follow up if symptoms worsen or fail to improve.       Enrike Shaw, PARAG   [facility]  4/14/23

## 2023-04-18 ENCOUNTER — PATIENT MESSAGE (OUTPATIENT)
Dept: FAMILY MEDICINE | Facility: CLINIC | Age: 48
End: 2023-04-18
Payer: COMMERCIAL

## 2023-04-18 DIAGNOSIS — H66.001 NON-RECURRENT ACUTE SUPPURATIVE OTITIS MEDIA OF RIGHT EAR WITHOUT SPONTANEOUS RUPTURE OF TYMPANIC MEMBRANE: Primary | ICD-10-CM

## 2023-04-25 ENCOUNTER — CLINICAL SUPPORT (OUTPATIENT)
Dept: OTOLARYNGOLOGY | Facility: CLINIC | Age: 48
End: 2023-04-25
Payer: COMMERCIAL

## 2023-04-25 ENCOUNTER — TELEPHONE (OUTPATIENT)
Dept: OTOLARYNGOLOGY | Facility: CLINIC | Age: 48
End: 2023-04-25
Payer: COMMERCIAL

## 2023-04-25 ENCOUNTER — OFFICE VISIT (OUTPATIENT)
Dept: OTOLARYNGOLOGY | Facility: CLINIC | Age: 48
End: 2023-04-25
Payer: COMMERCIAL

## 2023-04-25 VITALS
HEART RATE: 68 BPM | SYSTOLIC BLOOD PRESSURE: 132 MMHG | HEIGHT: 66 IN | BODY MASS INDEX: 37.21 KG/M2 | DIASTOLIC BLOOD PRESSURE: 80 MMHG | OXYGEN SATURATION: 98 % | WEIGHT: 231.5 LBS

## 2023-04-25 DIAGNOSIS — H93.299 ABNORMAL AUDITORY PERCEPTION, UNSPECIFIED LATERALITY: ICD-10-CM

## 2023-04-25 DIAGNOSIS — H92.01 RIGHT EAR PAIN: ICD-10-CM

## 2023-04-25 DIAGNOSIS — M26.621 ARTHRALGIA OF RIGHT TEMPOROMANDIBULAR JOINT: ICD-10-CM

## 2023-04-25 DIAGNOSIS — H66.001 NON-RECURRENT ACUTE SUPPURATIVE OTITIS MEDIA OF RIGHT EAR WITHOUT SPONTANEOUS RUPTURE OF TYMPANIC MEMBRANE: ICD-10-CM

## 2023-04-25 DIAGNOSIS — H90.3 SENSORINEURAL HEARING LOSS (SNHL), BILATERAL: Primary | ICD-10-CM

## 2023-04-25 DIAGNOSIS — H92.01 OTALGIA OF RIGHT EAR: ICD-10-CM

## 2023-04-25 DIAGNOSIS — H90.3 SENSORINEURAL HEARING LOSS (SNHL) OF BOTH EARS: ICD-10-CM

## 2023-04-25 DIAGNOSIS — J30.89 NON-SEASONAL ALLERGIC RHINITIS, UNSPECIFIED TRIGGER: Primary | ICD-10-CM

## 2023-04-25 PROCEDURE — 92567 PR TYMPA2METRY: ICD-10-PCS | Mod: S$GLB,,,

## 2023-04-25 PROCEDURE — 99999 PR PBB SHADOW E&M-EST. PATIENT-LVL IV: CPT | Mod: PBBFAC,,, | Performed by: NURSE PRACTITIONER

## 2023-04-25 PROCEDURE — 92567 TYMPANOMETRY: CPT | Mod: S$GLB,,,

## 2023-04-25 PROCEDURE — 99999 PR PBB SHADOW E&M-EST. PATIENT-LVL I: ICD-10-PCS | Mod: PBBFAC,,,

## 2023-04-25 PROCEDURE — 99204 PR OFFICE/OUTPT VISIT, NEW, LEVL IV, 45-59 MIN: ICD-10-PCS | Mod: S$GLB,,, | Performed by: NURSE PRACTITIONER

## 2023-04-25 PROCEDURE — 92557 COMPREHENSIVE HEARING TEST: CPT | Mod: S$GLB,,,

## 2023-04-25 PROCEDURE — 99999 PR PBB SHADOW E&M-EST. PATIENT-LVL I: CPT | Mod: PBBFAC,,,

## 2023-04-25 PROCEDURE — 92557 PR COMPREHENSIVE HEARING TEST: ICD-10-PCS | Mod: S$GLB,,,

## 2023-04-25 PROCEDURE — 99204 OFFICE O/P NEW MOD 45 MIN: CPT | Mod: S$GLB,,, | Performed by: NURSE PRACTITIONER

## 2023-04-25 PROCEDURE — 99999 PR PBB SHADOW E&M-EST. PATIENT-LVL IV: ICD-10-PCS | Mod: PBBFAC,,, | Performed by: NURSE PRACTITIONER

## 2023-04-25 RX ORDER — FLUTICASONE PROPIONATE 50 MCG
2 SPRAY, SUSPENSION (ML) NASAL DAILY
Qty: 9.9 ML | Refills: 11 | Status: SHIPPED | OUTPATIENT
Start: 2023-04-25

## 2023-04-25 NOTE — PROGRESS NOTES
"Tiara Fritz, a 47 y.o. female was seen today in the clinic for an audiologic evaluation. The patient's primary complaint was ear pain 2-3 weeks ago with ear infection. Ms. Fritz was treated with antibiotic and steriod. Symptoms have since resolved. Ms. Fritz experienced "muffled" hearing with ear infection which has since improved. She denied tinnitus, drainage, and dizziness. Patient denied history of noise exposure. Family history of hearing loss includes patient's grandmother who experienced hearing loss with aging.    Pure tone testing revealed normal hearing at 250-2000 Hz and 8000 Hz, bilaterally. A mild sensorineural notch was noted at 4000 Hz in both ears. Speech reception thresholds were obtained at 5 dBHL in the right ear and 10 dBHL in the left ear. Speech discrimination scores were 100% in the right ear and 100% in the left ear. Tympanometry revealed Type A tympanograms in both ears.    Recommendations:  Otologic evaluation  Annual audiologic evaluation  Hearing protection in noise          "

## 2023-04-25 NOTE — PATIENT INSTRUCTIONS
We had a long discussion regarding the underlying pathology of temporomandibular joint dysfunction (TMD) as the cause of ear pain.  We further discussed conservative measures to treat TMD including avoiding gum and other foods that require lots of chewing, warm compresses, and scheduled antinflammatories (such as Motrin, Ibuprofen, or Aleve).  The patient should also wear a  (purchased OTC or see dentist for custom), which prevents additional pressure on the TM joint.  Stress can also exacerbate TMJ symptoms.    If the pain persists, the patient will then schedule an appointment with a dentist for further evaluation.         How do you use a Nasal Corticosteroid Spray?    Make sure you understand your dosing instructions. Spray only the number of prescribed sprays in each nostril. Read the package instructions before using your spray the first time.    Most corticosteroid sprays suggest the following steps:    Wash your hands well.    Gently blow your nose to clear the passageway.    Shake the container several times.    Tilt your head slightly downward.  Use the opposite hand from the nostril you will be spraying to hold the spray bottle.    Block one nostril with your finger.  Insert the nasal applicator into the other nostril.    Aim the spray toward the outer wall of the nostril.  Inhale slowly through the nose and press the .    Breathe out and repeat to apply the prescribed number of sprays.  Repeat these steps for the other nostril.     Avoid sneezing or blowing your nose right after spraying.

## 2023-04-25 NOTE — PROGRESS NOTES
Chief Complaint   Patient presents with    Otalgia   .     HPI: Tiara Fritz is a 47 y.o. female who is referred to me by Enrike Shaw in consultation for right ear pain. She went to the  for otalgia on 4/7/2023 and was rx'ed Augmentin x 10 days for right otitis media. The patient did not go away with the abx.The patient then saw Chance Valeria on 4/14/2023  and was treated with Cortisporin and Prednisone. She states that the pain has resolved since taking Prednisone.  Tiara has had approximately many episodes of otitis media when she was a kid but none since then. The infections are typically manifested by ear pain.  Prior antibiotic therapy has included Augmentin.  Her nasal symptoms consist of sniffing and nasal congestion. She has moved here from [place] 2 years ago. Since then, her allergies have been worse. She is on Zyrtec daily. She does not use any nasal sprays.      Past Medical History:   Diagnosis Date    Anxiety     Basal cell carcinoma      Social History     Socioeconomic History    Marital status:    Tobacco Use    Smoking status: Never     Passive exposure: Never    Smokeless tobacco: Never   Substance and Sexual Activity    Alcohol use: Never    Drug use: Never    Sexual activity: Yes     Partners: Male     Birth control/protection: See Surgical Hx     Past Surgical History:   Procedure Laterality Date    COLONOSCOPY N/A 9/22/2022    Procedure: COLONOSCOPY;  Surgeon: Ashlie San MD;  Location: [facility];  Service: Endoscopy;  Laterality: N/A;    LUMBAR DISCECTOMY  2013    Robotic TLH/BS/sacrocolplexy  11/2020    [place]     Family History   Problem Relation Age of Onset    Colon cancer Mother     Diabetes Father     Breast cancer Paternal Grandmother     Ovarian cancer Neg Hx            Review of Systems  General: negative for chills, fever or weight loss  Psychological: negative for mood changes or depression  Ophthalmic: negative for blurry vision, photophobia or eye  pain  ENT: see HPI  Respiratory: no cough, shortness of breath, or wheezing  Cardiovascular: no chest pain or dyspnea on exertion  Gastrointestinal: no abdominal pain, change in bowel habits, or black/ bloody stools  Musculoskeletal: negative for gait disturbance or muscular weakness  Neurological: no syncope or seizures; no ataxia  Dermatological: negative for pruritis,  rash and jaundice  Hematologic/lymphatic: no easy bruising, no new adenopathy      Physical Exam:    Vitals:    04/25/23 0832   BP: 132/80   Pulse: 68       Constitutional: Well appearing / communicating without difficutly.  NAD.  Eyes: EOM I Bilaterally  Head/Face: Normocephalic.  Negative paranasal sinus pressure/tenderness.  Salivary glands WNL.  House Brackmann I Bilaterally.    Right Ear: Auricle normal appearance. External Auditory Canal within normal limits; no lesions or masses,TM with scarring but w/o masses/lesions/perforations. TM mobility noted.   Left Ear: Auricle normal appearance. External Auditory Canal within normal limits; no lesions or masses,TM w/o masses/lesions/perforations. TM mobility noted  Nose: No gross nasal septal deviation. Inferior Turbinates 3+ bilaterally. No septal perforation. No masses/lesions. External nasal skin appears normal without masses/lesions.  Oral Cavity: Gingiva/lips within normal limits.  Dentition/gingiva healthy appearing. Mucus membranes moist. Floor of mouth soft, no masses palpated. Oral Tongue mobile. Hard Palate appears normal.    Oropharynx: Base of tongue appears normal. No masses/lesions noted. Tonsillar fossa/pharyngeal wall without lesions. Posterior oropharynx WNL.  Soft palate without masses. Midline uvula.   Neck/Lymphatic: No LAD I-VI bilaterally.  No thyromegaly.  No masses noted on exam.    Mirror laryngoscopy/nasopharyngoscopy: Active gag reflex.  Unable to perform.    Neuro/Psychiatric: AOx3.  Normal mood and affect.   Cardiovascular: Normal carotid pulses bilaterally, no  increasing jugular venous distention noted at cervical region bilaterally.    Respiratory: Normal respiratory effort, no stridor, no retractions noted.      Audiogram interpreted personally by me and discussed in detail with the patient today.   Pure tone testing revealed normal hearing at 250-2000 Hz and 8000 Hz, bilaterally. A mild sensorineural notch was noted at 4000 Hz in both ears. Speech reception thresholds were obtained at 5 dBHL in the right ear and 10 dBHL in the left ear. Speech discrimination scores were 100% in the right ear and 100% in the left ear. Tympanometry revealed Type A tympanograms in both ears.        Assessment:    ICD-10-CM ICD-9-CM    1. Non-seasonal allergic rhinitis, unspecified trigger  J30.89 477.8 Aspergillus fumagatus IgE      Bermuda grass IgE      Cat epithelium IgE      Cladosporium IgE      Cockroach, American IgE      Bannock, bald IgE      D. farinae IgE      D. pteronyssinus IgE      Dog dander IgE      Plantain, English IgE      Lisandro grass IgE      Marsh elder, rough IgE      Mugwort IgE      Nettle IgE      Oak, white IgE      Penicillium IgE      Ragweed, short, common IgE      Sivakumar IgE      Allergen, Cocklebur      Allergen, Elm [place]      Allergen, Meadow Grass ([place] Blue)      Allergen, Mucor Racemosus      Allergen, Pecan Hickory IgE      Allergen, White Peter      Allergen-Alternaria Alternata      Allergen-Monument      Allergen-Common Pigweed      Allergen-Silver Birch      Feather Panel #2      RAST Allergen for Eastern [place]      RAST Allergen Maple ([place])      RAST Allergen [place]      RAST Allergen, Lamb's Quarters      RAST Allergen, Sheep [place](Yellow Dock)      2. Right ear pain  H92.01 388.70       3. Arthralgia of right temporomandibular joint  M26.621 524.62       4. Non-recurrent acute suppurative otitis media of right ear without spontaneous rupture of tympanic membrane  H66.001 382.00 Ambulatory referral/consult to ENT      5.  Sensorineural hearing loss (SNHL) of both ears -mild  H90.3 389.18         The primary encounter diagnosis was Non-seasonal allergic rhinitis, unspecified trigger. Diagnoses of Right ear pain, Arthralgia of right temporomandibular joint, Non-recurrent acute suppurative otitis media of right ear without spontaneous rupture of tympanic membrane, and Sensorineural hearing loss (SNHL) of both ears -mild were also pertinent to this visit.      Plan:  Orders Placed This Encounter   Procedures    Aspergillus fumagatus IgE    Bermuda grass IgE    Cat epithelium IgE    Cladosporium IgE    Cockroach, American IgE    [place], bald IgE    D. farinae IgE    D. pteronyssinus IgE    Dog dander IgE    Plantain, English IgE    Lisandro grass IgE    Marsh elder, rough IgE    Mugwort IgE    Nettle IgE    Oak, white IgE    Penicillium IgE    Ragweed, short, common IgE    Sivakumar IgE    Allergen, Cocklebur    Allergen, Elm [place]    Allergen, Meadow Grass ([place] Blue)    Allergen, Mucor Racemosus    Allergen, Pecan Hickory IgE    Allergen, White Peter    Allergen-Alternaria Alternata    Allergen-[place]    Allergen-Common Pigweed    Allergen-Silver Birch    Feather Panel #2    RAST Allergen for Eastern [place]    RAST Allergen Maple ([place])    RAST Allergen [place]    RAST Allergen, Lamb's Quarters    RAST Allergen, Sheep [place](Yellow Dock)     -We had a long discussion regarding the underlying pathology of temporomandibular joint dysfunction (TMD) as the cause of ear pain.  We further discussed conservative measures to treat TMD including avoiding gum and other foods that require lots of chewing, warm compresses, and scheduled antinflammatories (such as Motrin, Ibuprofen, or Aleve).  The patient should also wear a  (purchased OTC or see dentist for custom), which prevents additional pressure on the TM joint.  Stress can also exacerbate TMJ symptoms.    If the pain persists, the patient will then schedule an appointment  with a dentist for further evaluation.    -Bilateral ears are normal today with no signs of infection  -right otalgia could possibly be from TMJ  -ordered allergy panel/RAST to ascertain allergic triggers  -start on Flonase 2 sprays in each nostril daily  -continue on Zyrtec daily  - F/u 6 months or sooner as needed      Philomena Burnett NP      Answers submitted by the patient for this visit:  Review of Symptoms Questionnaire  (Submitted on 4/25/2023)  None of these: Yes  hearing loss: Yes  Ear infection(s)?: Yes  ear pain: Yes  None of these : Yes  None of these: Yes  None of these : Yes  None of these: Yes  None of these: Yes  None of these: Yes  None of these : Yes  Seasonal Allergies?: Yes  None of these : Yes  None of these: Yes  None of these: Yes  None of these: Yes

## 2023-04-25 NOTE — TELEPHONE ENCOUNTER
----- Message from Doyle Sheth sent at 4/25/2023  4:52 PM CDT -----  Contact: [facility]  Type:  Pharmacy Calling to Clarify an RX      [facility]   Prescription Name:fluticasone propionate (FLONASE) 50 mcg/actuation nasal spray  What do they need to clarify?: directions   Best Call Back Number:[phone]  Additional Information: their electronic system is down

## 2023-04-26 NOTE — TELEPHONE ENCOUNTER
Spoke with [facility]. Provided verbal order for medication that was ordered. I was thanked for returning call.

## 2024-02-15 DIAGNOSIS — Z12.31 OTHER SCREENING MAMMOGRAM: ICD-10-CM

## 2024-04-22 ENCOUNTER — OFFICE VISIT (OUTPATIENT)
Dept: FAMILY MEDICINE | Facility: CLINIC | Age: 49
End: 2024-04-22
Payer: COMMERCIAL

## 2024-04-22 VITALS
TEMPERATURE: 97.7 F | HEART RATE: 68 BPM | WEIGHT: 224 LBS | BODY MASS INDEX: 36 KG/M2 | OXYGEN SATURATION: 97 % | RESPIRATION RATE: 16 BRPM | DIASTOLIC BLOOD PRESSURE: 76 MMHG | HEIGHT: 66 IN | SYSTOLIC BLOOD PRESSURE: 118 MMHG

## 2024-04-22 DIAGNOSIS — Z13.1 SCREENING FOR DIABETES MELLITUS: ICD-10-CM

## 2024-04-22 DIAGNOSIS — Z13.29 SCREENING FOR THYROID DISORDER: ICD-10-CM

## 2024-04-22 DIAGNOSIS — F41.1 GENERALIZED ANXIETY DISORDER: Primary | ICD-10-CM

## 2024-04-22 DIAGNOSIS — E66.812 CLASS 2 OBESITY DUE TO EXCESS CALORIES WITHOUT SERIOUS COMORBIDITY WITH BODY MASS INDEX (BMI) OF 36.0 TO 36.9 IN ADULT: ICD-10-CM

## 2024-04-22 DIAGNOSIS — Z12.31 ENCOUNTER FOR SCREENING MAMMOGRAM FOR BREAST CANCER: ICD-10-CM

## 2024-04-22 DIAGNOSIS — Z13.220 SCREENING FOR HYPERLIPIDEMIA: ICD-10-CM

## 2024-04-22 DIAGNOSIS — F33.0 MILD EPISODE OF RECURRENT MAJOR DEPRESSIVE DISORDER (H): ICD-10-CM

## 2024-04-22 DIAGNOSIS — E66.09 CLASS 2 OBESITY DUE TO EXCESS CALORIES WITHOUT SERIOUS COMORBIDITY WITH BODY MASS INDEX (BMI) OF 36.0 TO 36.9 IN ADULT: ICD-10-CM

## 2024-04-22 LAB
ALBUMIN SERPL BCG-MCNC: 4.3 G/DL (ref 3.5–5.2)
ALP SERPL-CCNC: 48 U/L (ref 40–150)
ALT SERPL W P-5'-P-CCNC: 20 U/L (ref 0–50)
ANION GAP SERPL CALCULATED.3IONS-SCNC: 9 MMOL/L (ref 7–15)
AST SERPL W P-5'-P-CCNC: 24 U/L (ref 0–45)
BILIRUB SERPL-MCNC: 0.3 MG/DL
BUN SERPL-MCNC: 17.1 MG/DL (ref 6–20)
CALCIUM SERPL-MCNC: 9.7 MG/DL (ref 8.6–10)
CHLORIDE SERPL-SCNC: 103 MMOL/L (ref 98–107)
CHOLEST SERPL-MCNC: 139 MG/DL
CREAT SERPL-MCNC: 0.72 MG/DL (ref 0.51–0.95)
DEPRECATED HCO3 PLAS-SCNC: 26 MMOL/L (ref 22–29)
EGFRCR SERPLBLD CKD-EPI 2021: >90 ML/MIN/1.73M2
ERYTHROCYTE [DISTWIDTH] IN BLOOD BY AUTOMATED COUNT: 12.9 % (ref 10–15)
FASTING STATUS PATIENT QL REPORTED: YES
GLUCOSE SERPL-MCNC: 95 MG/DL (ref 70–99)
HBA1C MFR BLD: 5.4 % (ref 0–5.6)
HCT VFR BLD AUTO: 41.4 % (ref 35–47)
HDLC SERPL-MCNC: 49 MG/DL
HGB BLD-MCNC: 14 G/DL (ref 11.7–15.7)
LDLC SERPL CALC-MCNC: 72 MG/DL
MCH RBC QN AUTO: 33.7 PG (ref 26.5–33)
MCHC RBC AUTO-ENTMCNC: 33.8 G/DL (ref 31.5–36.5)
MCV RBC AUTO: 100 FL (ref 78–100)
NONHDLC SERPL-MCNC: 90 MG/DL
PLATELET # BLD AUTO: 261 10E3/UL (ref 150–450)
POTASSIUM SERPL-SCNC: 4.4 MMOL/L (ref 3.4–5.3)
PROT SERPL-MCNC: 7 G/DL (ref 6.4–8.3)
RBC # BLD AUTO: 4.15 10E6/UL (ref 3.8–5.2)
SODIUM SERPL-SCNC: 138 MMOL/L (ref 135–145)
TRIGL SERPL-MCNC: 89 MG/DL
TSH SERPL DL<=0.005 MIU/L-ACNC: 1.84 UIU/ML (ref 0.3–4.2)
WBC # BLD AUTO: 6.7 10E3/UL (ref 4–11)

## 2024-04-22 PROCEDURE — 80061 LIPID PANEL: CPT | Performed by: STUDENT IN AN ORGANIZED HEALTH CARE EDUCATION/TRAINING PROGRAM

## 2024-04-22 PROCEDURE — 99204 OFFICE O/P NEW MOD 45 MIN: CPT | Performed by: STUDENT IN AN ORGANIZED HEALTH CARE EDUCATION/TRAINING PROGRAM

## 2024-04-22 PROCEDURE — 83036 HEMOGLOBIN GLYCOSYLATED A1C: CPT | Performed by: STUDENT IN AN ORGANIZED HEALTH CARE EDUCATION/TRAINING PROGRAM

## 2024-04-22 PROCEDURE — 36415 COLL VENOUS BLD VENIPUNCTURE: CPT | Performed by: STUDENT IN AN ORGANIZED HEALTH CARE EDUCATION/TRAINING PROGRAM

## 2024-04-22 PROCEDURE — 85027 COMPLETE CBC AUTOMATED: CPT | Performed by: STUDENT IN AN ORGANIZED HEALTH CARE EDUCATION/TRAINING PROGRAM

## 2024-04-22 PROCEDURE — 84443 ASSAY THYROID STIM HORMONE: CPT | Performed by: STUDENT IN AN ORGANIZED HEALTH CARE EDUCATION/TRAINING PROGRAM

## 2024-04-22 PROCEDURE — 80053 COMPREHEN METABOLIC PANEL: CPT | Performed by: STUDENT IN AN ORGANIZED HEALTH CARE EDUCATION/TRAINING PROGRAM

## 2024-04-22 RX ORDER — VILAZODONE HYDROCHLORIDE 10 MG/1
1 TABLET ORAL
COMMUNITY
Start: 2024-03-31

## 2024-04-22 RX ORDER — VILAZODONE HYDROCHLORIDE 20 MG/1
10 TABLET ORAL DAILY
COMMUNITY
Start: 2024-01-01 | End: 2024-08-07

## 2024-04-22 RX ORDER — HYDROXYZINE HYDROCHLORIDE 25 MG/1
25 TABLET, FILM COATED ORAL PRN
COMMUNITY
Start: 2024-03-30

## 2024-04-22 NOTE — PROGRESS NOTES
"  Assessment & Plan     Generalized anxiety disorder    Stable, continue medication  Mild episode of recurrent major depressive disorder (H24)  Stable continue medication    Class 2 obesity due to excess calories without serious comorbidity with body mass index (BMI) of 36.0 to 36.9 in adult    - Semaglutide-Weight Management (WEGOVY) 0.25 MG/0.5ML pen; Inject 0.25 mg Subcutaneous once a week  - Semaglutide-Weight Management (WEGOVY) 0.25 MG/0.5ML pen; Inject 0.25 mg Subcutaneous once a week for 28 days  - Comprehensive metabolic panel (BMP + Alb, Alk Phos, ALT, AST, Total. Bili, TP); Future  - CBC with platelets; Future  - Semaglutide-Weight Management (WEGOVY) 0.5 MG/0.5ML pen; Inject 0.5 mg Subcutaneous once a week  - Comprehensive metabolic panel (BMP + Alb, Alk Phos, ALT, AST, Total. Bili, TP)  - CBC with platelets      Screening for hyperlipidemia    - Lipid panel reflex to direct LDL Fasting; Future  - Lipid panel reflex to direct LDL Fasting    Screening for diabetes mellitus    - Comprehensive metabolic panel (BMP + Alb, Alk Phos, ALT, AST, Total. Bili, TP); Future  - Hemoglobin A1c; Future  - Comprehensive metabolic panel (BMP + Alb, Alk Phos, ALT, AST, Total. Bili, TP)  - Hemoglobin A1c    Screening for thyroid disorder    - TSH with free T4 reflex; Future  - TSH with free T4 reflex    Encounter for screening mammogram for breast cancer    - *MA Screening Digital Bilateral; Future    Review of external notes as documented elsewhere in note        BMI  Estimated body mass index is 36.71 kg/m  as calculated from the following:    Height as of this encounter: 1.664 m (5' 5.5\").    Weight as of this encounter: 101.6 kg (224 lb).   Weight management plan: Discussed healthy diet and exercise guidelines          Krystle Bolaños is a 48 year old, presenting for the following health issues:  Establish Care and Discuss Weight Loss Medication      4/22/2024     7:17 AM   Additional Questions   Roomed by Tenisha " "MA         4/22/2024     7:17 AM   Patient Reported Additional Medications   Patient reports taking the following new medications See List     Via the Health Maintenance questionnaire, the patient has reported the following services have been completed -Mammogram-Colonscopy, this information has been sent to the abstraction team.  History of Present Illness       Reason for visit:  Establish care, ask about weight loss medication    She eats 0-1 servings of fruits and vegetables daily.She consumes 0 sweetened beverage(s) daily.She exercises with enough effort to increase her heart rate 9 or less minutes per day.  She exercises with enough effort to increase her heart rate 3 or less days per week.   She is taking medications regularly.       Patient moved here 10 months ago from [place]. She is interested in starting GLP 1 agonist for weight loss. She states she has struggled with weight all her life,   She denies family or personal history of MEN 1 or MTC.   She is currently in therapy. She is taking Vilazodone for depression and anxiety. She is on hydroxyzine as needed for anxiety.      Please abstract the following data from this visit with this patient into the appropriate field in Epic:    Tests that can be patient reported without a hard copy:    Colonoscopy done on this date: 2022 (approximately), by this group: [place]., results were normal.           Review of Systems  Constitutional, neuro, ENT, endocrine, pulmonary, cardiac, gastrointestinal, genitourinary, musculoskeletal, integument and psychiatric systems are negative, except as otherwise noted.      Objective    /76 (BP Location: Left arm, Patient Position: Chair, Cuff Size: Adult Large)   Pulse 68   Temp 97.7  F (36.5  C) (Oral)   Resp 16   Ht 1.664 m (5' 5.5\")   Wt 101.6 kg (224 lb)   SpO2 97%   BMI 36.71 kg/m    Body mass index is 36.71 kg/m .  Physical Exam   GENERAL: alert and no distress  RESP: lungs clear to auscultation - no " rales, rhonchi or wheezes  CV: regular rate and rhythm, normal S1 S2, no S3 or S4, no murmur, click or rub, no peripheral edema  MS: no gross musculoskeletal defects noted, no edema  PSYCH: mentation appears normal, affect normal/bright        Signed Electronically by: Lorraine Srinviasan MD

## 2024-04-22 NOTE — PATIENT INSTRUCTIONS
Luan Bolaños,    Thank you for allowing [facility] to manage your care.    I ordered some blood work, please go to the laboratory to get your laboratory studies.      I sent your prescriptions to your pharmacy.    I ordered a mammogram, please call diagnostic imaging [phone] to schedule your test.      For your convenience, test results are released as soon as they are available  Please allow 1-2 business days for me to send you a comment about your results.  If not done so, I encourage you to login into [employer] ([email or web address] to review your results in real time.     If you have any questions or concerns, please feel free to call us at [phone].    Sincerely,    Dr. Srinivasan    Did you know?      You can schedule a video visit for follow-up appointments as well as future appointments for certain conditions.  Please see the below link.     [email or web address]    If you have not already done so,  I encourage you to sign up for [employer] ([email or web address]).  This will allow you to review your results, securely communicate with a provider, and schedule virtual visits as well.

## 2024-04-22 NOTE — Clinical Note
Please abstract the following data from this visit with this patient into the appropriate field in Epic:  Tests that can be patient reported without a hard copy:  Colonoscopy done on this date: 2022 (approximately), by this group: [place]., results were normal.

## 2024-04-24 ENCOUNTER — TELEPHONE (OUTPATIENT)
Dept: FAMILY MEDICINE | Facility: CLINIC | Age: 49
End: 2024-04-24
Payer: COMMERCIAL

## 2024-04-24 ENCOUNTER — MYC MEDICAL ADVICE (OUTPATIENT)
Dept: FAMILY MEDICINE | Facility: CLINIC | Age: 49
End: 2024-04-24
Payer: COMMERCIAL

## 2024-04-24 DIAGNOSIS — E66.812 CLASS 2 OBESITY DUE TO EXCESS CALORIES WITHOUT SERIOUS COMORBIDITY WITH BODY MASS INDEX (BMI) OF 36.0 TO 36.9 IN ADULT: Primary | ICD-10-CM

## 2024-04-24 DIAGNOSIS — E66.09 CLASS 2 OBESITY DUE TO EXCESS CALORIES WITHOUT SERIOUS COMORBIDITY WITH BODY MASS INDEX (BMI) OF 36.0 TO 36.9 IN ADULT: Primary | ICD-10-CM

## 2024-04-24 NOTE — TELEPHONE ENCOUNTER
Retail Pharmacy Prior Authorization Team   Phone: [phone]    PRIOR AUTHORIZATION DENIED    Medication: WEGOVY 0.25 MG/0.5ML SC SOAJ  Insurance Company: [employer] - Phone [phone] Fax [phone]  Denial Date: 4/24/2024  Denial Reason(s): MUST BE ENROLLED IN COMPREHENSIVE WEIGHT MANAGEMENT MTM CLINIC      Appeal Information: IF THE PROVIDER WOULD LIKE TO APPEAL THIS DECISION PLEASE PROVIDE THE PA TEAM WITH A LETTER OF MEDICAL NECESSITY      Patient Notified: NO

## 2024-05-17 ENCOUNTER — OFFICE VISIT (OUTPATIENT)
Dept: FAMILY MEDICINE | Facility: CLINIC | Age: 49
End: 2024-05-17
Payer: COMMERCIAL

## 2024-05-17 VITALS
RESPIRATION RATE: 18 BRPM | OXYGEN SATURATION: 95 % | BODY MASS INDEX: 35.68 KG/M2 | WEIGHT: 222 LBS | TEMPERATURE: 97.4 F | HEIGHT: 66 IN | HEART RATE: 68 BPM | DIASTOLIC BLOOD PRESSURE: 80 MMHG | SYSTOLIC BLOOD PRESSURE: 128 MMHG

## 2024-05-17 DIAGNOSIS — Z12.4 CERVICAL CANCER SCREENING: ICD-10-CM

## 2024-05-17 DIAGNOSIS — Z00.00 ROUTINE GENERAL MEDICAL EXAMINATION AT A HEALTH CARE FACILITY: Primary | ICD-10-CM

## 2024-05-17 DIAGNOSIS — N89.8 VAGINAL DISCHARGE: ICD-10-CM

## 2024-05-17 DIAGNOSIS — Z23 ENCOUNTER FOR ADMINISTRATION OF VACCINE: ICD-10-CM

## 2024-05-17 DIAGNOSIS — E66.09 CLASS 2 OBESITY DUE TO EXCESS CALORIES WITHOUT SERIOUS COMORBIDITY WITH BODY MASS INDEX (BMI) OF 36.0 TO 36.9 IN ADULT: ICD-10-CM

## 2024-05-17 DIAGNOSIS — E66.812 CLASS 2 OBESITY DUE TO EXCESS CALORIES WITHOUT SERIOUS COMORBIDITY WITH BODY MASS INDEX (BMI) OF 36.0 TO 36.9 IN ADULT: ICD-10-CM

## 2024-05-17 DIAGNOSIS — Z71.89 ADVANCE CARE PLANNING: ICD-10-CM

## 2024-05-17 LAB
CLUE CELLS: ABNORMAL
TRICHOMONAS, WET PREP: ABNORMAL
WBC'S/HIGH POWER FIELD, WET PREP: ABNORMAL
YEAST, WET PREP: ABNORMAL

## 2024-05-17 PROCEDURE — G0145 SCR C/V CYTO,THINLAYER,RESCR: HCPCS | Performed by: STUDENT IN AN ORGANIZED HEALTH CARE EDUCATION/TRAINING PROGRAM

## 2024-05-17 PROCEDURE — 90471 IMMUNIZATION ADMIN: CPT | Performed by: STUDENT IN AN ORGANIZED HEALTH CARE EDUCATION/TRAINING PROGRAM

## 2024-05-17 PROCEDURE — 87210 SMEAR WET MOUNT SALINE/INK: CPT | Performed by: STUDENT IN AN ORGANIZED HEALTH CARE EDUCATION/TRAINING PROGRAM

## 2024-05-17 PROCEDURE — 99396 PREV VISIT EST AGE 40-64: CPT | Mod: 25 | Performed by: STUDENT IN AN ORGANIZED HEALTH CARE EDUCATION/TRAINING PROGRAM

## 2024-05-17 PROCEDURE — 99213 OFFICE O/P EST LOW 20 MIN: CPT | Mod: 25 | Performed by: STUDENT IN AN ORGANIZED HEALTH CARE EDUCATION/TRAINING PROGRAM

## 2024-05-17 PROCEDURE — 87624 HPV HI-RISK TYP POOLED RSLT: CPT | Performed by: STUDENT IN AN ORGANIZED HEALTH CARE EDUCATION/TRAINING PROGRAM

## 2024-05-17 PROCEDURE — 90750 HZV VACC RECOMBINANT IM: CPT | Performed by: STUDENT IN AN ORGANIZED HEALTH CARE EDUCATION/TRAINING PROGRAM

## 2024-05-17 SDOH — HEALTH STABILITY: PHYSICAL HEALTH: ON AVERAGE, HOW MANY DAYS PER WEEK DO YOU ENGAGE IN MODERATE TO STRENUOUS EXERCISE (LIKE A BRISK WALK)?: 2 DAYS

## 2024-05-17 ASSESSMENT — ANXIETY QUESTIONNAIRES
GAD7 TOTAL SCORE: 0
1. FEELING NERVOUS, ANXIOUS, OR ON EDGE: NOT AT ALL
7. FEELING AFRAID AS IF SOMETHING AWFUL MIGHT HAPPEN: NOT AT ALL
3. WORRYING TOO MUCH ABOUT DIFFERENT THINGS: NOT AT ALL
GAD7 TOTAL SCORE: 0
6. BECOMING EASILY ANNOYED OR IRRITABLE: NOT AT ALL
IF YOU CHECKED OFF ANY PROBLEMS ON THIS QUESTIONNAIRE, HOW DIFFICULT HAVE THESE PROBLEMS MADE IT FOR YOU TO DO YOUR WORK, TAKE CARE OF THINGS AT HOME, OR GET ALONG WITH OTHER PEOPLE: NOT DIFFICULT AT ALL
5. BEING SO RESTLESS THAT IT IS HARD TO SIT STILL: NOT AT ALL
2. NOT BEING ABLE TO STOP OR CONTROL WORRYING: NOT AT ALL

## 2024-05-17 ASSESSMENT — PATIENT HEALTH QUESTIONNAIRE - PHQ9
5. POOR APPETITE OR OVEREATING: NOT AT ALL
SUM OF ALL RESPONSES TO PHQ QUESTIONS 1-9: 1

## 2024-05-17 ASSESSMENT — SOCIAL DETERMINANTS OF HEALTH (SDOH): HOW OFTEN DO YOU GET TOGETHER WITH FRIENDS OR RELATIVES?: TWICE A WEEK

## 2024-05-17 NOTE — PATIENT INSTRUCTIONS
"Preventive Care Advice   This is general advice we often give to help people stay healthy. Your care team may have specific advice just for you. Please talk to your care team about your own preventive care needs.  Lifestyle  Exercise at least 150 minutes each week (30 minutes a day, 5 days a week).  Do muscle strengthening activities 2 days a week. These help control your weight and prevent disease.  No smoking.  Wear sunscreen to prevent skin cancer.  Have your home tested for radon every 2 to 5 years. Radon is a colorless, odorless gas that can harm your lungs. To learn more, go to www.health.[facility].mn. and search for \"Radon in Homes.\"  Keep guns unloaded and locked up in a safe place like a safe or gun vault, or, use a gun lock and hide the keys. Always lock away bullets separately. To learn more, visit [place].gov and search for \"safe gun storage.\"  Nutrition  Eat 5 or more servings of fruits and vegetables each day.  Try wheat bread, brown rice and whole grain pasta (instead of white bread, rice, and pasta).  Get enough calcium and vitamin D. Check the label on foods and aim for 100% of the RDA (recommended daily allowance).  Regular exams  Have a dental exam and cleaning every 6 months.  See your health care team every year to talk about:  Any changes in your health.  Any medicines your care team has prescribed.  Preventive care, family planning, and ways to prevent chronic diseases.  Shots (vaccines)   HPV shots (up to age 26), if you've never had them before.  Hepatitis B shots (up to age 59), if you've never had them before.  COVID-19 shot: Get this shot when it's due.  Flu shot: Get a flu shot every year.  Tetanus shot: Get a tetanus shot every 10 years.  Pneumococcal, hepatitis A, and RSV shots: Ask your care team if you need these based on your risk.  Shingles shot (for age 50 and up).  General health tests  Diabetes screening:  Starting at age 35, Get screened for diabetes at least every 3 years.  If " you are younger than age 35, ask your care team if you should be screened for diabetes.  Cholesterol test: At age 39, start having a cholesterol test every 5 years, or more often if advised.  Bone density scan (DEXA): At age 50, ask your care team if you should have this scan for osteoporosis (brittle bones).  Hepatitis C: Get tested at least once in your life.  Abdominal aortic aneurysm screening: Talk to your doctor about having this screening if you:  Have ever smoked; and  Are biologically male; and  Are between the ages of 65 and 75.  STIs (sexually transmitted infections)  Before age 24: Ask your care team if you should be screened for STIs.  After age 24: Get screened for STIs if you're at risk. You are at risk for STIs (including HIV) if:  You are sexually active with more than one person.  You don't use condoms every time.  You or a partner was diagnosed with a sexually transmitted infection.  If you are at risk for HIV, ask about PrEP medicine to prevent HIV.  Get tested for HIV at least once in your life, whether you are at risk for HIV or not.  Cancer screening tests  Cervical cancer screening: If you have a cervix, begin getting regular cervical cancer screening tests at age 21. Most people who have regular screenings with normal results can stop after age 65. Talk about this with your provider.  Breast cancer scan (mammogram): If you've ever had breasts, begin having regular mammograms starting at age 40. This is a scan to check for breast cancer.  Colon cancer screening: It is important to start screening for colon cancer at age 45.  Have a colonoscopy test every 10 years (or more often if you're at risk) Or, ask your provider about stool tests like a FIT test every year or Cologuard test every 3 years.  To learn more about your testing options, visit: www.ReferralMD/178541.pdf.  For help making a decision, visit: gilmer/ea80528.  Prostate cancer screening test: If you have a prostate and are age 55  to 69, ask your provider if you would benefit from a yearly prostate cancer screening test.  Lung cancer screening: If you are a current or former smoker age 50 to 80, ask your care team if ongoing lung cancer screenings are right for you.  For informational purposes only. Not to replace the advice of your health care provider. Copyright   2023 [employer]. All rights reserved. Clinically reviewed by the [facility] Transitions Program. [employer] 898209 - REV 04/24.    Learning About Stress  What is stress?     Stress is your body's response to a hard situation. Your body can have a physical, emotional, or mental response. Stress is a fact of life for most people, and it affects everyone differently. What causes stress for you may not be stressful for someone else.  A lot of things can cause stress. You may feel stress when you go on a job interview, take a test, or run a race. This kind of short-term stress is normal and even useful. It can help you if you need to work hard or react quickly. For example, stress can help you finish an important job on time.  Long-term stress is caused by ongoing stressful situations or events. Examples of long-term stress include long-term health problems, ongoing problems at work, or conflicts in your family. Long-term stress can harm your health.  How does stress affect your health?  When you are stressed, your body responds as though you are in danger. It makes hormones that speed up your heart, make you breathe faster, and give you a burst of energy. This is called the fight-or-flight stress response. If the stress is over quickly, your body goes back to normal and no harm is done.  But if stress happens too often or lasts too long, it can have bad effects. Long-term stress can make you more likely to get sick, and it can make symptoms of some diseases worse. If you tense up when you are stressed, you may develop neck, shoulder, or low back pain. Stress is  linked to high blood pressure and heart disease.  Stress also harms your emotional health. It can make you mattson, tense, or depressed. Your relationships may suffer, and you may not do well at work or school.  What can you do to manage stress?  You can try these things to help manage stress:   Do something active. Exercise or activity can help reduce stress. Walking is a great way to get started. Even everyday activities such as housecleaning or yard work can help.  Try yoga or arya chi. These techniques combine exercise and meditation. You may need some training at first to learn them.  Do something you enjoy. For example, listen to music or go to a movie. Practice your hobby or do volunteer work.  Meditate. This can help you relax, because you are not worrying about what happened before or what may happen in the future.  Do guided imagery. Imagine yourself in any setting that helps you feel calm. You can use online videos, books, or a teacher to guide you.  Do breathing exercises. For example:  From a standing position, bend forward from the waist with your knees slightly bent. Let your arms dangle close to the floor.  Breathe in slowly and deeply as you return to a standing position. Roll up slowly and lift your head last.  Hold your breath for just a few seconds in the standing position.  Breathe out slowly and bend forward from the waist.  Let your feelings out. Talk, laugh, cry, and express anger when you need to. Talking with supportive friends or family, a counselor, or a marleny leader about your feelings is a healthy way to relieve stress. Avoid discussing your feelings with people who make you feel worse.  Write. It may help to write about things that are bothering you. This helps you find out how much stress you feel and what is causing it. When you know this, you can find better ways to cope.  What can you do to prevent stress?  You might try some of these things to help prevent stress:  Manage your time.  "This helps you find time to do the things you want and need to do.  Get enough sleep. Your body recovers from the stresses of the day while you are sleeping.  Get support. Your family, friends, and community can make a difference in how you experience stress.  Limit your news feed. Avoid or limit time on social media or news that may make you feel stressed.  Do something active. Exercise or activity can help reduce stress. Walking is a great way to get started.  Where can you learn more?  Go to https://www.[employer].net/patiented  Enter N032 in the search box to learn more about \"Learning About Stress.\"  Current as of: October 24, 2023               Content Version: 14.0    7873-6344 [employer].   Care instructions adapted under license by your healthcare professional. If you have questions about a medical condition or this instruction, always ask your healthcare professional. [employer] disclaims any warranty or liability for your use of this information.      "

## 2024-05-17 NOTE — PROGRESS NOTES
Preventive Care Visit  [facility] ARANZA Srinivasan MD, Family Medicine  May 17, 2024      Assessment & Plan     1. Routine general medical examination at a health care facility  Patient will send her immunization record via [employer]    2. Vaginal discharge    - Wet prep - Clinic Collect    3. Cervical cancer screening  Pap with HPV    4. Advance care planning  Information given to patient    5 encounter for vaccine  Zooster    Patient has been advised of split billing requirements and indicates understanding: Yes        Counseling  Appropriate preventive services were discussed with this patient, including applicable screening as appropriate for fall prevention, nutrition, physical activity, Tobacco-use cessation, weight loss and cognition.  Checklist reviewing preventive services available has been given to the patient.  Reviewed patient's diet, addressing concerns and/or questions.   She is at risk for lack of exercise and has been provided with information to increase physical activity for the benefit of her well-being.           Krystle Bolaños is a 49 year old, presenting for the following:  Physical        5/17/2024     7:20 AM   Additional Questions   Roomed by Tenisha PALACIOS         5/17/2024     7:20 AM   Patient Reported Additional Medications   Patient reports taking the following new medications None        Health Care Directive  Patient does not have a Health Care Directive or Living Will: Discussed advance care planning with patient; information given to patient to review.    HPI  Vaginal discharge ongoing for few days. She has started wegovy for weight loss. She is tolerating it.      5/17/2024   General Health   How would you rate your overall physical health? Good   Feel stress (tense, anxious, or unable to sleep) To some extent   (!) STRESS CONCERN      5/17/2024   Nutrition   Three or more servings of calcium each day? Yes   Diet: Regular (no restrictions)   How many  servings of fruit and vegetables per day? (!) 2-3   How many sweetened beverages each day? 0-1         5/17/2024   Exercise   Days per week of moderate/strenous exercise 2 days   (!) EXERCISE CONCERN      5/17/2024   Social Factors   Frequency of gathering with friends or relatives Twice a week   Worry food won't last until get money to buy more No   Food not last or not have enough money for food? No   Do you have housing?  Yes   Are you worried about losing your housing? No   Lack of transportation? No   Unable to get utilities (heat,electricity)? No         5/17/2024   Fall Risk   Fallen 2 or more times in the past year? No   Trouble with walking or balance? No          5/17/2024   Dental   Dentist two times every year? Yes         5/17/2024   TB Screening   Were you born outside of the [place]? No         Today's PHQ-9 Score:       5/17/2024     8:36 AM   PHQ-9 SCORE   PHQ-9 Total Score 1           5/17/2024   Substance Use   Alcohol more than 3/day or more than 7/wk Not Applicable   Do you use any other substances recreationally? No     Social History     Tobacco Use    Smoking status: Never     Passive exposure: Never    Smokeless tobacco: Never   Vaping Use    Vaping status: Never Used          Mammogram Screening - Patient under 40 years of age: Routine Mammogram Screening not recommended.           5/17/2024   One time HIV Screening   Previous HIV test? Yes         5/17/2024   STI Screening   New sexual partner(s) since last STI/HIV test? No     History of abnormal Pap smear: No - age 30- 64 PAP with HPV every 5 years recommended       ASCVD Risk   The 10-year ASCVD risk score (iKrt PERSAUD, et al., 2019) is: 1%    Values used to calculate the score:      Age: 49 years      Sex: Female      Is Non- : No      Diabetic: No      Tobacco smoker: No      Systolic Blood Pressure: 128 mmHg      Is BP treated: No      HDL Cholesterol: 49 mg/dL      Total Cholesterol: 139  "mg/dL          Reviewed and updated as needed this visit by Provider      Past Medical History:   Diagnosis Date    Generalized anxiety disorder     History of basal cell carcinoma     Major depression     STEVE (obstructive sleep apnea)      Past Surgical History:   Procedure Laterality Date    DISCECTOMY LUMBAR ANTERIOR      HYSTERECTOMY, PAP NO LONGER INDICATED           Review of Systems  Constitutional, neuro, ENT, endocrine, pulmonary, cardiac, gastrointestinal, genitourinary, musculoskeletal, integument and psychiatric systems are negative, except as otherwise noted.     Objective    Exam  /80 (BP Location: Left arm, Patient Position: Chair, Cuff Size: Adult Large)   Pulse 68   Temp 97.4  F (36.3  C) (Tympanic)   Resp 18   Ht 1.67 m (5' 5.75\")   Wt 100.7 kg (222 lb)   SpO2 95%   BMI 36.10 kg/m     Estimated body mass index is 36.1 kg/m  as calculated from the following:    Height as of this encounter: 1.67 m (5' 5.75\").    Weight as of this encounter: 100.7 kg (222 lb).    Physical Exam  GENERAL: alert and no distress  EYES: Eyes grossly normal to inspection, PERRL and conjunctivae and sclerae normal  HENT: ear canals and TM's normal, nose and mouth without ulcers or lesions  NECK: no adenopathy, no asymmetry, masses, or scars  RESP: lungs clear to auscultation - no rales, rhonchi or wheezes  CV: regular rate and rhythm, normal S1 S2, no S3 or S4, no murmur, click or rub, no peripheral edema  ABDOMEN: soft, nontender, no hepatosplenomegaly, no masses and bowel sounds normal  MS: no gross musculoskeletal defects noted, no edema  SKIN: no suspicious lesions or rashes  NEURO: Normal strength and tone, mentation intact and speech normal  PSYCH: mentation appears normal, affect normal/bright   (female): normal female external genitalia, normal urethral meatus, vaginal mucosa pink, moist, well rugated, normal cervix/adnexa/uterus without masses or discharge,non-erythematous,  A Pap smear was " performed.Perineum was normal on inspection.        Signed Electronically by: Lorraine Srinivasan MD

## 2024-05-18 LAB
HPV HR 12 DNA CVX QL NAA+PROBE: NEGATIVE
HPV16 DNA CVX QL NAA+PROBE: NEGATIVE
HPV18 DNA CVX QL NAA+PROBE: NEGATIVE
HUMAN PAPILLOMA VIRUS FINAL DIAGNOSIS: NORMAL

## 2024-05-19 ENCOUNTER — HEALTH MAINTENANCE LETTER (OUTPATIENT)
Age: 49
End: 2024-05-19

## 2024-05-22 LAB
BKR LAB AP GYN ADEQUACY: NORMAL
BKR LAB AP GYN INTERPRETATION: NORMAL
BKR LAB AP PREVIOUS ABNORMAL: NORMAL
PATH REPORT.COMMENTS IMP SPEC: NORMAL
PATH REPORT.COMMENTS IMP SPEC: NORMAL
PATH REPORT.RELEVANT HX SPEC: NORMAL

## 2024-06-03 DIAGNOSIS — E66.812 CLASS 2 OBESITY DUE TO EXCESS CALORIES WITHOUT SERIOUS COMORBIDITY WITH BODY MASS INDEX (BMI) OF 36.0 TO 36.9 IN ADULT: Primary | ICD-10-CM

## 2024-06-03 DIAGNOSIS — E66.09 CLASS 2 OBESITY DUE TO EXCESS CALORIES WITHOUT SERIOUS COMORBIDITY WITH BODY MASS INDEX (BMI) OF 36.0 TO 36.9 IN ADULT: Primary | ICD-10-CM

## 2024-06-05 ENCOUNTER — TELEPHONE (OUTPATIENT)
Dept: FAMILY MEDICINE | Facility: CLINIC | Age: 49
End: 2024-06-05
Payer: COMMERCIAL

## 2024-06-05 NOTE — TELEPHONE ENCOUNTER
Prior Authorization Retail Medication Request    Medication/Dose: Semaglutide, 1 MG/DOSE, (OZEMPIC) 4 MG/3ML pen  Diagnosis and ICD code (if different than what is on RX):  E66.09, Z68.36   New/renewal/insurance change PA/secondary ins. PA:  Previously Tried and Failed:  na  Rationale:  n    Insurance   Primary:  [facility]  Insurance ID:  [id number]     Secondary (if applicable):na  Insurance ID:  na    Pharmacy Information (if different than what is on RX)  Name:   [facility]  Phone:  [phone]  Fax:     [phone]

## 2024-06-13 ENCOUNTER — TELEPHONE (OUTPATIENT)
Dept: ENDOCRINOLOGY | Facility: CLINIC | Age: 49
End: 2024-06-13
Payer: COMMERCIAL
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
## 2024-06-13 NOTE — TELEPHONE ENCOUNTER
Central Prior Authorization Team   Phone: [phone]    PA Initiation    Medication: Semaglutide, 1 MG/DOSE, (OZEMPIC) 4 MG/3ML pen  Insurance Company: [employer] - Phone [phone] Fax [phone]  Pharmacy Filling the Rx: [id number] - [place], [place] - [address]  Filling Pharmacy Phone: [phone]  Filling Pharmacy Fax:    Start Date: 6/13/2024

## 2024-06-14 ENCOUNTER — VIRTUAL VISIT (OUTPATIENT)
Dept: ENDOCRINOLOGY | Facility: CLINIC | Age: 49
End: 2024-06-14
Payer: COMMERCIAL

## 2024-06-14 ENCOUNTER — TELEPHONE (OUTPATIENT)
Dept: ENDOCRINOLOGY | Facility: CLINIC | Age: 49
End: 2024-06-14

## 2024-06-14 VITALS — BODY MASS INDEX: 36.16 KG/M2 | HEIGHT: 66 IN | WEIGHT: 225 LBS

## 2024-06-14 DIAGNOSIS — Z71.3 NUTRITIONAL COUNSELING: Primary | ICD-10-CM

## 2024-06-14 DIAGNOSIS — E66.01 CLASS 2 SEVERE OBESITY WITH SERIOUS COMORBIDITY AND BODY MASS INDEX (BMI) OF 37.0 TO 37.9 IN ADULT, UNSPECIFIED OBESITY TYPE (H): Primary | ICD-10-CM

## 2024-06-14 DIAGNOSIS — E66.812 CLASS 2 SEVERE OBESITY WITH SERIOUS COMORBIDITY AND BODY MASS INDEX (BMI) OF 37.0 TO 37.9 IN ADULT, UNSPECIFIED OBESITY TYPE (H): Primary | ICD-10-CM

## 2024-06-14 DIAGNOSIS — E66.9 OBESITY: ICD-10-CM

## 2024-06-14 DIAGNOSIS — E28.2 PCOS (POLYCYSTIC OVARIAN SYNDROME): ICD-10-CM

## 2024-06-14 DIAGNOSIS — Z87.19 HISTORY OF FATTY INFILTRATION OF LIVER: ICD-10-CM

## 2024-06-14 PROCEDURE — 97802 MEDICAL NUTRITION INDIV IN: CPT | Mod: 95

## 2024-06-14 PROCEDURE — 99204 OFFICE O/P NEW MOD 45 MIN: CPT | Mod: 95 | Performed by: NURSE PRACTITIONER

## 2024-06-14 PROCEDURE — 99207 PR NO CHARGE LOS: CPT | Mod: 95

## 2024-06-14 ASSESSMENT — PAIN SCALES - GENERAL
PAINLEVEL: MILD PAIN (3)
PAINLEVEL: MILD PAIN (3)

## 2024-06-14 NOTE — PATIENT INSTRUCTIONS
Luan Bolaños,    Please follow-up with dietitian on Monday, July 15th at 10:30 am.    Thank you,    Kailey Graff, MPS, RD, LD  If you need to schedule or reschedule, please call [phone].    Nutrition Goals:  Sign up for a water aerobics class at the [facility] and try to attend class 2x/week  Aim to walk 2 miles 3x/week.  Aim to get your estimated daily needs by food group as listed below.  Continue to get adequate hydration, aiming for 64-96 oz/day.    Estimated Daily Needs By Food Group For Weight Loss  - 1.5 cups of fruit (1 cup of fruit is equivalent to 1 cup raw/frozen/cooked/canned fruit, 1/2 cup dried fruit, or 1 cup 100% fruit juice)  - 2 cups of vegetables (1 cup of vegetables is equivalent to 1 cup raw/cooked/canned vegetables, 2 cups leafy salad greens, or 1 cup 100% vegetable juice)  - 5 oz of grains (1 oz of grains is equivalent to 1 slice of bread or 1/2 cup of cooked rice, pasta or cereal)  - 5 oz of protein (1 oz of protein is equivalent to 1 egg, 1 tbsp peanut butter, 1/4 cup cooked beans/peas/lentils, or 1 oz seafood/meat/poultry. 3 oz of seafood/meat/poultry is about the size of a deck of cards)  - 3 cups dairy (1 cup of dairy is equivalent to 1 cup of milk, yogurt, cottage cheese, 1.5 oz hard cheese, or 1/3 cup shredded cheese)  - Fat in moderation    Additional Resources:  The Plate Method  [email or web address]    Protein Sources   [email or web address]     Carbohydrates  [email or web address]     Mindful Eating  [email or web address]     Summary of Volumetrics Eating Plan  [email or web address]

## 2024-06-14 NOTE — NURSING NOTE
Is the patient currently in the state of [place]? YES    Visit mode:VIDEO    If the visit is dropped, the patient can be reconnected by: VIDEO VISIT: Text to cell phone:   Telephone Information:   Mobile [phone]    and VIDEO VISIT: Send to e-mail at: [email or web address]    Will anyone else be joining the visit? NO  (If patient encounters technical issues they should call [phone]    How would you like to obtain your AVS? MyChart    Are changes needed to the allergy or medication list? No    Are refills needed on medications prescribed by this physician? NO    Reason for visit: Consult    Estrella PAUL

## 2024-06-14 NOTE — PATIENT INSTRUCTIONS
"Luan Bolaños, it was nice to meet you today!  Thank you for allowing us the privilege of caring for you. We hope we provided you with the excellent service you deserve.   Please let us know if there is anything else we can do for you so that we can be sure you are completely satisfied with your care experience.    To ensure the quality of our services you may be receiving a patient satisfaction survey from an independent patient satisfaction monitoring company.    The greatest compliment you can give is a \"Likely to Recommend\"    Your visit was with Shanda Sanches NP today.    Instructions per today's visit:     Follow up  plan:  Schedule fibroscan   Continue semaglutide 1mg   Work with dietitian   Avoid missing meals  Protein with every eating episode   Be mindful of fluids   Follow up with me in 3 months     ___________________________________________________________________________  Important contact and scheduling information:  Please call our contact center at [phone] to schedule your next appointments.  For any nursing questions or concerns call Aleah Lewis LPN at [phone] or Sujatha Aguiar RN at [phone]  Please call during clinic hours Monday through Friday 8:00a - 4:00p if you have questions or you can contact us via PatientFocus at anytime and we will reply during clinic hours.    Lab results will be communicated through My Chart or letter (if My Chart not used). Please call the clinic if you have not received communication after 1 week or if you have any questions.?  Clinic Fax: [phone]  __________________________________________________________________________    If labs were ordered today:    Please make an appointment to have them drawn at your convenience.     To schedule the Lab Appointment using PatientFocus:  Select \"Schedule an Appointment\"  Select \"Lab Only\"  For \"A couple of questions\", select \"Other\"  For \"Which locations work for you?, select the location and set up the appointment    To " schedule by phone call [phone] to schedule a lab only appointment at any [facility] lab.  ___________________________________________________________________________  Work with A Health !  Virtual Sessions are Available through [facility]    To learn more, call to schedule a free, Health  Q&A appointment: [phone]     What is Health Coaching?  Do you know what you are supposed to do, but you just aren't doing it?  Then, HEALTH COACHING may help you!   Get unstuck and move forward with the support of a professionally trained NBC-HWC (National Board-Certified Health and ) who uses evidence-based approaches to help you move forward with healthy lifestyle changes in the areas of weight loss, stress management and overall well-being.    Health Coaches help you identify goals that will work best for you. Health Coaches provide support and encouragement with overcoming barriers and help you to find inspiration and motivation to lead a healthy lifestyle.    Option one:  Health Coaching 3-Pack; Three, 30-minute Health Coaching Visits, for $99  Visits are done virtually (phone or video)  This is a self pay service; we do not accept insurance for shannan coaching.    Option two:   The 24 week Plan; 11 Health Coaching Visits, and a 7 months subscription to [employer]-- on-demand fitness, nutrition and mindfulness classes, for $499 (employee discounts may be available). Participants will also meet regularly with a weight management Medical Provider and a Registered/Licensed Dietician.  This is a self-pay service; we do not accept insurance for health coaching.    To Schedule a free Health  Q&A appointment to learn more,  call [phone].  ____________________________________________________________________  [facility]  Healthy Lifestyle Group    Healthy Lifestyle Group  This is a 60 minute virtual coaching  "group for those who want to lead a healthier lifestyle. Come together to set goals and overcome barriers in a supportive group environment. We will address the four pillars of health--nutrition, exercise, sleep and emotional well-being.  This group is highly recommended for those who are participating in the 24 week Healthy Lifestyle Plan and our Health Coaching sessions.    WHEN: This group meets the first Friday of the month, 12:30 PM - 1:30 PM online, via a zoom meeting.      FACILITATOR: Led by National Board Certified Health and , Nataliia Haro [facility].    TO REGISTER: Please call the Call Center at [phone] to register. You will get an appointment to attend in [employer]. Fifteen minutes prior to the meeting, complete the e-check in and you will get the link to join the meeting.  There is no charge to attend this group and space is limited.      2023 and 2024 Meeting Topics and Dates:    November 3: Introduction to Mindfulness (Learn simple and effective mindfulness practices and how it can benefit you)    December 8: Let's Talk (guided discussion on our wins and challenges)    January 5: New Years Vision: Manifest your Best 2024! (Guided imagery,  journaling and discussion)    February 2: Let's Talk    March 1: 10 Percent Happier by Bernard Rothman (Book Bites; a guided discussion on the nuggets of wisdom from favorite wellness books; no need to read the book but highly encouraged)    April 5: Let's Talk    May 3: \"Essentialism; The Disciplined Pursuit of Less by Jesse Cruz (book bites discussion)    June 7: Let's Talk    July 5: NO MEETING, off for the 4th of July Holiday    August 2: The Blue Zones, Secrets for Living a Longer Life by Bernard Venegas (book bites discussion)      If you would like bariatric surgery specific support group info please let your care team know.         Thank you,   [facility] Comprehensive Weight Management Team    Compound Semaglutide at [facility]  [facility] is now offering compounded semaglutide during the time of Wegovy national shortage/limited supply. Semaglutide is the generic name of Wegovy. [facility] is following the highest standards for sterility and compounding practices. Not all compounding practices are equal. Therefore, [facility] will not be prescribing compounded semaglutide outside of the [facility]. Compounding of semaglutide is legal for as long as Wegovy is on the FDA's national shortage list. When/if Wegovy is taken off the FDA's shortage list, compounded semaglutide will no longer be legal to manufacture. When this occurs, patients will have to turn to acquiring Wegovy via its available manufactured pen, look into alternative weight loss medication(s), or stop the medication. Compound semaglutide will be available as a pre-filled syringe. Due to high demand of compounded semaglutide, orders may take 1-2 weeks to obtain from time of prescribing. Each dose of the medication will require a separate prescription.     As with any weight loss medication(s), there is a risk of weight regain should you stop semaglutide. It is important to be aware of this risk should you stop compounded semaglutide with no plans to transition back to an alternative injectable option as the use of semaglutide is intended for long term weight management with the intention of remaining on this injectable long term.        Obtaining Medication and Storage:   The pharmacy must speak to the patient directly prior to shipping medication to walk through administration, shipping and cost. Pre-filled syringes of compounded semaglutide and needles will be mailed from the compounding pharmacy to your home in a refrigerated box. The pre-filled syringes should be stored in the refrigerator until time of injection. The medication is good for at least 30 days in refrigerator.     Dosing:  Week 1-4: Inject  0.25 mg subcutaneously once weekly  Week 5-8: If tolerating, increase to 0.5 mg once weekly  Week 9-12: If tolerating, increase to 1 mg once weekly  Week 13-15: If tolerating, increase to 1.5 mg once weekly  Week 16-19: If tolerating, increase to 2 mg once weekly  Week 20 & on: If tolerating, increase to 2.5 mg once weekly   *If you are having some nausea or other side effects to where you are hesitant to move up to the next dose, stay at the same dose you are on for an additional 4 weeks to see if side effect(s) improves/resolves. Make sure to take this time to hydrate and utilizing smaller more consistent meals, such as 4-6 small meals per day.  It may be advantageous to stay at the same dose if you are seeing good efficacy (both on and off the scale) and having minimal/manageable side effects. If you do not have additional refills on that dose's prescription, please reach out to the clinic.    Common Side Effects:  Side effect profile is the same as Wegovy. Monitor for nausea, diarrhea, constipation, headache, indigestion, tiredness (fatigue), stomach upset or abdominal pain. Less commonly, semaglutide can cause low blood sugar (symptoms: shaky, dizzy, sweaty, agitation). Please reach out to the care team should you feel like this is occurring. It is important to ensure that you are eating consistent meals and not skipping meals. Ensure you are getting at least 64 oz water daily. If any side effects become unmanageable, contact the care team immediately.    Administration:  Wash your hands.   Obtain compound semaglutide syringe, needle and alcohol swab. Remove pre-filled syringe from refrigerator. Keep all other unused syringes in the refrigerator until time of use.   Inspect the syringe to ensure medication in syringe is clear/colorless and the clear shell cap on top of red syringe cap is intact.  Unlock the cap by pulling the clear outer shell straight off and remove the red syringe cap by twisting counter  "clockwise.  Attach needle to syringe by twisting needle onto syringe clockwise. Do not remove needle cap.   Choose injection site. Clean injection site with alcohol swab.    Appropriate areas of injection are: abdomen (at least 2 inches away from belly button) or front middle thigh.   Remove needle cap from syringe/needle.   Hold the syringe like a pencil. Insert the needle into skin at a 90-degree angle.  Using your pointer finger, push the syringe plunger down to inject the medicine.  Count to six. Then, remove the syringe from your skin.   Immediately place the syringe in a sharps container.   You can purchase a sharps container from your local pharmacy or [employer]. If you don't have a sharps container, you can use a plastic detergent container with a lid. The container should seal tightly, hold objects without leaking, breaking or cracking, and clearly be labelled \"sharps\".     Compounded Semaglutide monthly (4 pre-filled syringes) cost:   0.25 mg ~$230   0.5 mg ~$260   1 mg ~$306   1.5 mg ~$342   2 mg ~$395   2.5 mg ~$438    [facility] Phone:  [phone]                       "

## 2024-06-14 NOTE — TELEPHONE ENCOUNTER
Reason for Call:  Appointment Request    Patient requesting this type of appt:  Fibroscan    Requested provider:  n/a    Reason patient unable to be scheduled: Needs to be scheduled by clinic    When does patient want to be seen/preferred time:  when available    Comments: pt stated she received a call from a nurse, no note in chart.    Could we send this information to you in [facility] or would you prefer to receive a phone call?:   Patient would prefer a phone call   Okay to leave a detailed message?: Yes at Cell number on file:    Telephone Information:   Mobile [phone]       Call taken on 6/14/2024 at 1:29 PM by Martine Marin
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
## 2024-06-14 NOTE — NURSING NOTE
Is the patient currently in the state of [place]? YES    Visit mode:VIDEO    If the visit is dropped, the patient can be reconnected by: VIDEO VISIT: Send to e-mail at: [email or web address]    Will anyone else be joining the visit? NO  (If patient encounters technical issues they should call [phone]    How would you like to obtain your AVS? MyChart    Are changes needed to the allergy or medication list? No    Are refills needed on medications prescribed by this physician? NO    Reason for visit: Consult    Estrella PAUL

## 2024-06-17 PROBLEM — E28.2 PCOS (POLYCYSTIC OVARIAN SYNDROME): Status: ACTIVE | Noted: 2024-06-17

## 2024-06-17 PROBLEM — G47.30 SLEEP APNEA: Status: ACTIVE | Noted: 2024-01-01

## 2024-06-17 PROBLEM — Z87.19 HISTORY OF FATTY INFILTRATION OF LIVER: Status: ACTIVE | Noted: 2024-06-17

## 2024-06-17 NOTE — TELEPHONE ENCOUNTER
PRIOR AUTHORIZATION DENIED    Medication: Semaglutide, 1 MG/DOSE, (OZEMPIC) 4 MG/3ML pen-PA DENIED     Denial Date: 6/15/2024    Denial Rational:         Appeal Information:

## 2024-06-19 ENCOUNTER — MYC MEDICAL ADVICE (OUTPATIENT)
Dept: FAMILY MEDICINE | Facility: CLINIC | Age: 49
End: 2024-06-19

## 2024-06-19 ENCOUNTER — OFFICE VISIT (OUTPATIENT)
Dept: FAMILY MEDICINE | Facility: CLINIC | Age: 49
End: 2024-06-19
Payer: COMMERCIAL

## 2024-06-19 VITALS
HEART RATE: 72 BPM | WEIGHT: 219 LBS | SYSTOLIC BLOOD PRESSURE: 126 MMHG | RESPIRATION RATE: 18 BRPM | BODY MASS INDEX: 35.2 KG/M2 | HEIGHT: 66 IN | TEMPERATURE: 98 F | OXYGEN SATURATION: 95 % | DIASTOLIC BLOOD PRESSURE: 82 MMHG

## 2024-06-19 DIAGNOSIS — E66.01 MORBID OBESITY (H): Primary | ICD-10-CM

## 2024-06-19 DIAGNOSIS — Z23 ENCOUNTER FOR ADMINISTRATION OF VACCINE: ICD-10-CM

## 2024-06-19 DIAGNOSIS — F33.0 MILD EPISODE OF RECURRENT MAJOR DEPRESSIVE DISORDER (H): ICD-10-CM

## 2024-06-19 DIAGNOSIS — F41.1 GENERALIZED ANXIETY DISORDER: ICD-10-CM

## 2024-06-19 PROCEDURE — 96127 BRIEF EMOTIONAL/BEHAV ASSMT: CPT | Performed by: STUDENT IN AN ORGANIZED HEALTH CARE EDUCATION/TRAINING PROGRAM

## 2024-06-19 PROCEDURE — 90471 IMMUNIZATION ADMIN: CPT | Performed by: STUDENT IN AN ORGANIZED HEALTH CARE EDUCATION/TRAINING PROGRAM

## 2024-06-19 PROCEDURE — 90715 TDAP VACCINE 7 YRS/> IM: CPT | Performed by: STUDENT IN AN ORGANIZED HEALTH CARE EDUCATION/TRAINING PROGRAM

## 2024-06-19 PROCEDURE — 99213 OFFICE O/P EST LOW 20 MIN: CPT | Mod: 25 | Performed by: STUDENT IN AN ORGANIZED HEALTH CARE EDUCATION/TRAINING PROGRAM

## 2024-06-19 RX ORDER — FLUTICASONE PROPIONATE 50 MCG
2 SPRAY, SUSPENSION (ML) NASAL DAILY
COMMUNITY
Start: 2023-07-29

## 2024-06-19 ASSESSMENT — ANXIETY QUESTIONNAIRES
7. FEELING AFRAID AS IF SOMETHING AWFUL MIGHT HAPPEN: SEVERAL DAYS
GAD7 TOTAL SCORE: 2
7. FEELING AFRAID AS IF SOMETHING AWFUL MIGHT HAPPEN: SEVERAL DAYS
5. BEING SO RESTLESS THAT IT IS HARD TO SIT STILL: NOT AT ALL
1. FEELING NERVOUS, ANXIOUS, OR ON EDGE: NOT AT ALL
GAD7 TOTAL SCORE: 2
3. WORRYING TOO MUCH ABOUT DIFFERENT THINGS: SEVERAL DAYS
6. BECOMING EASILY ANNOYED OR IRRITABLE: NOT AT ALL
2. NOT BEING ABLE TO STOP OR CONTROL WORRYING: NOT AT ALL
IF YOU CHECKED OFF ANY PROBLEMS ON THIS QUESTIONNAIRE, HOW DIFFICULT HAVE THESE PROBLEMS MADE IT FOR YOU TO DO YOUR WORK, TAKE CARE OF THINGS AT HOME, OR GET ALONG WITH OTHER PEOPLE: NOT DIFFICULT AT ALL
8. IF YOU CHECKED OFF ANY PROBLEMS, HOW DIFFICULT HAVE THESE MADE IT FOR YOU TO DO YOUR WORK, TAKE CARE OF THINGS AT HOME, OR GET ALONG WITH OTHER PEOPLE?: NOT DIFFICULT AT ALL
4. TROUBLE RELAXING: NOT AT ALL

## 2024-06-19 ASSESSMENT — ENCOUNTER SYMPTOMS: NERVOUS/ANXIOUS: 1

## 2024-06-19 NOTE — PATIENT INSTRUCTIONS
Luan Bolaños,    Thank you for allowing [facility] to manage your care.       For your convenience, test results are released as soon as they are available  Please allow 1-2 business days for me to send you a comment about your results.  If not done so, I encourage you to login into [employer] ([email or web address] to review your results in real time.     If you have any questions or concerns, please feel free to call us at [phone].    Sincerely,    Dr. Srinivasan    Did you know?      You can schedule a video visit for follow-up appointments as well as future appointments for certain conditions.  Please see the below link.     [email or web address]    If you have not already done so,  I encourage you to sign up for [employer] ([email or web address]).  This will allow you to review your results, securely communicate with a provider, and schedule virtual visits as well.

## 2024-06-19 NOTE — PROGRESS NOTES
Assessment & Plan     Morbid Obesity  Weight is improving. Continue wegovy.  Pt is aware of the importance of diet and exercise  And how her overall health is impacted by her wieght    Mild episode of recurrent major depressive disorder (H24)  Stable, continue medication  Generalized anxiety disorder  Stable, continue medication  Encounter for administration of vaccine    - TDAP 10-64Y (ADACEL,BOOSTRIX)          Krystle Bolaños is a 49 year old, presenting for the following health issues:  Depression and Anxiety      6/19/2024     8:16 AM   Additional Questions   Roomed by Tenisha PALACIOS         6/19/2024     8:16 AM   Patient Reported Additional Medications   Patient reports taking the following new medications No new medications to add     Via the Health Maintenance questionnaire, the patient has reported the following services have been completed -Mammogram: [facility] 2022-01-01, this information has been sent to the abstraction team.  Anxiety    History of Present Illness       Mental Health Follow-up:  Patient presents to follow-up on Depression & Anxiety.Patient's depression since last visit has been:  Good  The patient is not having other symptoms associated with depression.  Patient's anxiety since last visit has been:  Better  The patient is not having other symptoms associated with anxiety.  Any significant life events: relationship concerns and grief or loss  Patient is not feeling anxious or having panic attacks.  Patient has no concerns about alcohol or drug use.    Reason for visit:  Follow up    She eats 2-3 servings of fruits and vegetables daily.She consumes 0 sweetened beverage(s) daily.She exercises with enough effort to increase her heart rate 10 to 19 minutes per day.  She exercises with enough effort to increase her heart rate 3 or less days per week.   She is taking medications regularly.     She is currently on Wegovy for morbid obesity. Comorbidity includes PCOS, HLD and  "STEVE.      Social History     Tobacco Use    Smoking status: Never     Passive exposure: Never    Smokeless tobacco: Never   Vaping Use    Vaping status: Never Used         5/17/2024     8:36 AM   PHQ   PHQ-9 Total Score 1   Q9: Thoughts of better off dead/self-harm past 2 weeks Not at all         5/17/2024     8:36 AM 6/19/2024     7:46 AM   LOI-7 SCORE   Total Score  2 (minimal anxiety)   Total Score 0 2         Suicide Assessment Five-step Evaluation and Treatment (SAFE-T)                Objective    /82 (BP Location: Right arm, Patient Position: Sitting, Cuff Size: Adult Large)   Pulse 72   Temp 98  F (36.7  C) (Oral)   Resp 18   Ht 1.664 m (5' 5.5\")   Wt 99.3 kg (219 lb)   SpO2 95%   BMI 35.89 kg/m    Body mass index is 35.89 kg/m .  Physical Exam               Signed Electronically by: Lorraine Srinivasan MD    "

## 2024-06-20 ENCOUNTER — TELEPHONE (OUTPATIENT)
Dept: ENDOCRINOLOGY | Facility: CLINIC | Age: 49
End: 2024-06-20
Payer: COMMERCIAL

## 2024-06-20 ENCOUNTER — MYC REFILL (OUTPATIENT)
Dept: FAMILY MEDICINE | Facility: CLINIC | Age: 49
End: 2024-06-20
Payer: COMMERCIAL

## 2024-06-20 DIAGNOSIS — E66.812 CLASS 2 OBESITY DUE TO EXCESS CALORIES WITHOUT SERIOUS COMORBIDITY WITH BODY MASS INDEX (BMI) OF 36.0 TO 36.9 IN ADULT: ICD-10-CM

## 2024-06-20 DIAGNOSIS — E66.09 CLASS 2 OBESITY DUE TO EXCESS CALORIES WITHOUT SERIOUS COMORBIDITY WITH BODY MASS INDEX (BMI) OF 36.0 TO 36.9 IN ADULT: ICD-10-CM

## 2024-06-20 NOTE — TELEPHONE ENCOUNTER
MTM referral from: [facility] visit (referral by provider)    MTM referral outreach attempt #2 on June 20, 2024 at 1:50 PM      Outcome: Patient not reachable after several attempts, routed to Pharmacist Team/Provider as an FYI    Use hbc for the carrier/Plan on the flowsheet      [employer] Message Sent    Alix Castro  MTM      Patient called back and scheduled visit.

## 2024-06-21 ENCOUNTER — ALLIED HEALTH/NURSE VISIT (OUTPATIENT)
Dept: ENDOCRINOLOGY | Facility: CLINIC | Age: 49
End: 2024-06-21
Payer: COMMERCIAL

## 2024-06-21 ENCOUNTER — TRANSFERRED RECORDS (OUTPATIENT)
Dept: HEALTH INFORMATION MANAGEMENT | Facility: CLINIC | Age: 49
End: 2024-06-21

## 2024-06-21 DIAGNOSIS — E66.01 CLASS 2 SEVERE OBESITY WITH SERIOUS COMORBIDITY AND BODY MASS INDEX (BMI) OF 37.0 TO 37.9 IN ADULT, UNSPECIFIED OBESITY TYPE (H): ICD-10-CM

## 2024-06-21 DIAGNOSIS — K76.0 METABOLIC DYSFUNCTION-ASSOCIATED STEATOTIC LIVER DISEASE (MASLD): ICD-10-CM

## 2024-06-21 DIAGNOSIS — E66.812 CLASS 2 SEVERE OBESITY WITH SERIOUS COMORBIDITY AND BODY MASS INDEX (BMI) OF 37.0 TO 37.9 IN ADULT, UNSPECIFIED OBESITY TYPE (H): ICD-10-CM

## 2024-06-21 DIAGNOSIS — Z87.19 HISTORY OF FATTY INFILTRATION OF LIVER: Primary | ICD-10-CM

## 2024-06-21 PROCEDURE — 91200 LIVER ELASTOGRAPHY: CPT | Mod: 26 | Performed by: NURSE PRACTITIONER

## 2024-06-21 NOTE — PROGRESS NOTES
EXAMINATION:    Liver FibroScan    DATE OF EXAM:  6/21/24    INDICATION:    Liver fibrosis assessment      A series of at least 10 Vibration Controlled Transient Elastography (VCTETM) measurements was performed by  placing the probe M (M, XL) over the center of the liver parenchyma and mechanically inducing a 50 Hertz  shear wave.    Each resulting VCTETM measurement was analyzed to determine shear wave propagation speed and  calculate the equivalent liver stiffness.    All measurements were reviewed by the  and physician fortechnical accuracy. Data variability across the acquired measurements was quantified with IQR/Median Percentage.    FINDINGS:    The median liver stiffness was 3.8 kPa with IQR/Median percentage of 16 %.    The measure CAP ultrasound attenuation rate value was 258 dB/m.

## 2024-06-25 NOTE — TELEPHONE ENCOUNTER
MTM Referral outreach attempt #3 was made on 06/25/2024.           Outcome: Sent patient MyChart message explaining more in-depth what MTM is and how we can best support them. Attached ability to schedule from message, as well as offered opportunity to call me directly for help with scheduling.

## 2024-06-26 ENCOUNTER — TELEPHONE (OUTPATIENT)
Dept: ENDOCRINOLOGY | Facility: CLINIC | Age: 49
End: 2024-06-26
Payer: COMMERCIAL

## 2024-06-26 NOTE — TELEPHONE ENCOUNTER
Left Voicemail (1st Attempt) and Sent Mychart (1st Attempt) for the patient to call back and schedule the following:    Appointment type: DOUGLAS DIAMOND  Provider: Shanda Sanches  Return date: around 9/14/24  Specialty phone number: [phone]  Additional appointment(s) needed: n/a  Additonal Notes: n/a

## 2024-06-29 ENCOUNTER — PATIENT OUTREACH (OUTPATIENT)
Dept: CARE COORDINATION | Facility: CLINIC | Age: 49
End: 2024-06-29
Payer: COMMERCIAL

## 2024-07-03 ENCOUNTER — VIRTUAL VISIT (OUTPATIENT)
Dept: CARDIOLOGY | Facility: CLINIC | Age: 49
End: 2024-07-03
Attending: NURSE PRACTITIONER
Payer: COMMERCIAL

## 2024-07-03 ENCOUNTER — TELEPHONE (OUTPATIENT)
Dept: CARDIOLOGY | Facility: CLINIC | Age: 49
End: 2024-07-03
Payer: COMMERCIAL

## 2024-07-03 VITALS — BODY MASS INDEX: 36.16 KG/M2 | WEIGHT: 225 LBS | HEIGHT: 66 IN

## 2024-07-03 DIAGNOSIS — E66.812 CLASS 2 SEVERE OBESITY WITH SERIOUS COMORBIDITY AND BODY MASS INDEX (BMI) OF 37.0 TO 37.9 IN ADULT, UNSPECIFIED OBESITY TYPE (H): Primary | ICD-10-CM

## 2024-07-03 DIAGNOSIS — E28.2 PCOS (POLYCYSTIC OVARIAN SYNDROME): ICD-10-CM

## 2024-07-03 DIAGNOSIS — E66.01 CLASS 2 SEVERE OBESITY WITH SERIOUS COMORBIDITY AND BODY MASS INDEX (BMI) OF 37.0 TO 37.9 IN ADULT, UNSPECIFIED OBESITY TYPE (H): Primary | ICD-10-CM

## 2024-07-03 RX ORDER — SEMAGLUTIDE 1 MG/.5ML
1 INJECTION, SOLUTION SUBCUTANEOUS
Qty: 2 ML | Refills: 3 | Status: SHIPPED | OUTPATIENT
Start: 2024-07-03

## 2024-07-03 NOTE — PROGRESS NOTES
"Virtual Visit Details    Type of service:  Telephone Visit   Phone call duration: *** minutes   Originating Location (pt. Location): {patient location:593063::\"Home\"}  {PROVIDER LOCATION On-site should be selected for visits conducted from your clinic location or adjoining [facility] hospital, academic office, or other nearby [facility] building. Off-site should be selected for all other provider locations, including home:[id number]}  Distant Location (provider location):  {virtual location provider:585421}      "

## 2024-07-03 NOTE — NURSING NOTE
Current patient location: [address] [place] [address]  [place] [address]    Is the patient currently in the state of [place]? YES    Visit mode:VIDEO    If the visit is dropped, the patient can be reconnected by: TELEPHONE VISIT: Phone number: [phone]    Will anyone else be joining the visit? NO  (If patient encounters technical issues they should call [phone]    How would you like to obtain your AVS? MyChart    Are changes needed to the allergy or medication list? No    Are refills needed on medications prescribed by this physician? NO    Reason for visit: Consult    Haley ANGULOF

## 2024-07-03 NOTE — Clinical Note
Luan Land!  Copying you as an FYI, I am helping Mami to get transitioned back over to Wegovy with coverage through [facility].  Interestingly, with use of the compounded semaglutide 1 mg she has noted a significantly diminished response.  For that reason, I am asking her to transition back over to Wegovy at 1 mg instead of titrating up to 1.7 mg in the event that the compounded product for some reason is not effectively equivalent in this instance.  Please let me know if you have any questions.  I have her scheduled for follow-up with Ebonie in October and asked her to schedule follow-up with you as you had asked previously.  Thank you, Vijay

## 2024-07-03 NOTE — Clinical Note
7/3/2024      RE: Mami Epps  [address]  [place] [address]       Dear Colleague,    Thank you for the opportunity to participate in the care of your patient, Mami Epps, at the [facility]. Please see a copy of my visit note below.    Medication Therapy Management (MTM) Encounter    ASSESSMENT:                            Medication Adherence/Access: See below for considerations    Weight management: Weight stable, initial responsiveness to brand/commercial product of Wegovy has interestingly diminished significantly with transition temporarily to compounded product.  Fortunately, she does not meet coverage criteria through her insurance, her pretreatment BMI is greater than 30 kg/m  with recent FibroScan identifying hepatic steatosis. Negative history of pancreatitis, medullary thyroid cancer and multiple endocrine neoplasia type 2.  Therapy at 1 mg weekly is well-tolerated without GI adverse effects, mild nausea with initiation of brand-name Wegovy.  Because of her diminished response with use of the compounded product at 1 mg, advised conservatively transitioning to Wegovy brand-name at 1 mg and subsequent titration if needed based off of response.    For patients that are under [employer] insurance coverage, it is mandated by insurance that each qualifying patient meet with hospital based Weight Management Medication Therapy Management pharmacist to continue therapy coverage. The following patient meets the below coverage criteria and can therefore continue GLP-1/GIP agonist therapy for Weight Management:    Adult  BMI >40 with or without comorbidities   OR   BMI >30 + NAFLD*   at time of initiating GLP-1/GIP agonist therapy Approved for 29 weeks  Met Updated Initial Criteria   At least 5% weight loss of baseline body weight  Approved for 12 months      Depression: Improved with relatively  recent initiation of Viibryd, therapy is well-tolerated without noted adverse effects.    Allergies: Stable.    PLAN:                            Complete your supply of compounded semaglutide 1 mg once weekly.    Transition then to Wegovy 1 mg once weekly for at least 4 weeks.  If tolerating and having adequate response, remain at 1 mg once weekly.    Otherwise if tolerating but without adequate effects, increase to 1.7 mg once weekly.    Follow-up with Ebonie Magaña, Fabien.D. as scheduled in October.    Follow-up with Shanda Sanches CNP in the next 2 to 3 months.    Copay card: https://www.[employer]/coverage-[email or web address]-savings/save-on-wegovy.html    SUBJECTIVE/OBJECTIVE:                          Mami Epps is a 49 year old female called for an initial visit. She was referred to me from Shanda Sanches, R insurance requirements .      Reason for visit: Semaglutide follow up.    Allergies/ADRs: Reviewed in chart  Past Medical History: Reviewed in chart  Tobacco: She reports that she has never smoked. She has never been exposed to tobacco smoke. She has never used smokeless tobacco.      Medication Adherence/Access: See below     Weight management:  Compounded semaglutide 1 mg     Has taken 3x doses of semaglutide at 1 mg. Comments on essentially no effects since switching to the 1 mg compounded semaglutide. With historical initiation of Wegovy, had some mild nausea. Over the last 3 weeks, no nausea, vomiting, loose stools, constipation, acid reflux, abdominal pain. Recalls with use of Wegovy 0.5 mg significant positive effects on appetite control, but still able to moderate portions and eat adequate quantities. Wegovy was paid for out of pocket and started initially 3 months ago.     Medical History:  MEN2/Medullary Thyroid Cancer: Negative   Pancreatitis: Negative, negative gall bladder history     Wt Readings from Last 4 Encounters:   07/03/24 102.1 kg (225 lb)   06/19/24 99.3 kg (219 lb)   06/14/24 102.1 kg (225 lb)  "  05/17/24 100.7 kg (222 lb)     Body Mass Index (BMI) Body mass index is 36.86 kg/m .    Today's Vitals: Ht 1.664 m (5' 5.51\")   Wt 102.1 kg (225 lb)   BMI 36.86 kg/m      Lab Results   Component Value Date    A1C 5.4 04/22/2024       Depression:  Viibryd 20 mg + 10 mg every day     Recently working through end of a relationship, significant improvement in depressive symptoms. Has access to hydroxyzine, hasn't required.     Allergies:  Fluticasone 50mcg 1 spray each nostril    Using seasonally.   ----------------      I spent 30 minutes with this patient today. All changes were made via collaborative practice agreement with Shanda Sanches. A copy of the visit note was provided to the patient's provider(s).    A summary of these recommendations was sent via [employer].    Anant Monroy, PharmD, BCACP  Medication Therapy Management Pharmacist  [facility]     Telemedicine Visit Details  Type of service:  Telephone visit  Start Time:  1030am  End Time:  1100am     Medication Therapy Recommendations  No medication therapy recommendations to display           Please do not hesitate to contact me if you have any questions/concerns.     Sincerely,     ANANT MONROY RPH  "

## 2024-07-03 NOTE — TELEPHONE ENCOUNTER
Hello team,    Forwarding encounter as a request to initiate prior authorization for Wegovy 1 mg and subsequent doses. Please let me know if you need any additional details or information.    Thank you for your help,  Vijay

## 2024-07-03 NOTE — PATIENT INSTRUCTIONS
"Recommendations from today's MTM visit:                                                    MTM (medication therapy management) is a service provided by a clinical pharmacist designed to help you get the most of out of your medicines.      Complete your supply of compounded semaglutide 1 mg once weekly.     Transition then to Wegovy 1 mg once weekly for at least 4 weeks.  If tolerating and having adequate response, remain at 1 mg once weekly.     Otherwise if tolerating but without adequate effects, increase to 1.7 mg once weekly.     Follow-up with Valdo NavaD. as scheduled in October.     Follow-up with Shanda Sanches CNP in the next 2 to 3 months.     Copay card: https://www.[employer]/coverage-[email or web address]-savings/save-on-wegovy.html    It was great speaking with you today.  I value your experience and would be very thankful for your time in providing feedback in our clinic survey. In the next few days, you may receive an email or text message from [employer] with a link to a survey related to your  clinical pharmacist.\"     To schedule another MTM appointment, please call the clinic directly or you may call the MTM scheduling line at [phone].    My Clinical Pharmacist's contact information:                                                      Please feel free to contact me with any questions or concerns you have.      Fabien AlcazarD, [facility] CenterCP  Medication Therapy Management Pharmacist  [facility]    "

## 2024-07-05 NOTE — TELEPHONE ENCOUNTER
PA Initiation Key: [id number]    Medication: WEGOVY 1 MG/0.5ML SC SOAJ  Insurance Company: [employer] - Phone [phone] Fax [phone]  Pharmacy Filling the Rx: [facility] - [place], [place] - [facility] [address]  Filling Pharmacy Phone: [phone]  Filling Pharmacy Fax: [phone]  Start Date: 7/5/2024

## 2024-07-05 NOTE — TELEPHONE ENCOUNTER
Prior Authorization Approval    Medication: WEGOVY 1 MG/0.5ML SC SOAJ  Authorization Effective Date: 7/5/2024  Authorization Expiration Date: 1/24/2025  Approved Dose/Quantity:    Reference #: Key: [id number]   Insurance Company: [employer] [phone] Fax [phone]  Expected CoPay: $    CoPay Card Available: No    Financial Assistance Needed:    Which Pharmacy is filling the prescription: [facility] - [facility] [address]  Pharmacy Notified: Y  Patient Notified: Y
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
## 2024-07-15 ENCOUNTER — VIRTUAL VISIT (OUTPATIENT)
Dept: ENDOCRINOLOGY | Facility: CLINIC | Age: 49
End: 2024-07-15
Payer: COMMERCIAL

## 2024-07-15 VITALS — WEIGHT: 215 LBS | HEIGHT: 66 IN | BODY MASS INDEX: 34.55 KG/M2

## 2024-07-15 DIAGNOSIS — E28.2 PCOS (POLYCYSTIC OVARIAN SYNDROME): ICD-10-CM

## 2024-07-15 DIAGNOSIS — E66.9 OBESITY: ICD-10-CM

## 2024-07-15 DIAGNOSIS — Z71.3 NUTRITIONAL COUNSELING: Primary | ICD-10-CM

## 2024-07-15 DIAGNOSIS — Z87.19 HISTORY OF FATTY INFILTRATION OF LIVER: ICD-10-CM

## 2024-07-15 PROCEDURE — 99207 PR NO CHARGE LOS: CPT | Mod: 93

## 2024-07-15 PROCEDURE — 97802 MEDICAL NUTRITION INDIV IN: CPT | Mod: 93

## 2024-07-15 ASSESSMENT — PAIN SCALES - GENERAL: PAINLEVEL: NO PAIN (0)

## 2024-07-15 NOTE — NURSING NOTE
Current patient location: [address] [place] [address] [place] [address]    Is the patient currently in the state of [place]? YES    Visit mode:TELEPHONE    If the visit is dropped, the patient can be reconnected by: TELEPHONE VISIT: Phone number: [phone]    Will anyone else be joining the visit? NO  (If patient encounters technical issues they should call [phone]    How would you like to obtain your AVS? MyChart    Are changes needed to the allergy or medication list? Pt stated no changes to allergies and Pt stated no med changes    Are refills needed on medications prescribed by this physician? NO    Reason for visit: Follow Up    Haley PAUL

## 2024-07-15 NOTE — PATIENT INSTRUCTIONS
Luan Bolaños,    Please follow-up with dietitian on Thursday, August 29th at 9:00 am.    Thank you,    Kailey Graff, MPS, RD, LD  If you need to schedule or reschedule, please call [phone].    Nutrition Goals:  1. Aim for exercise 3-4 times/week  2. Aim to get your estimated daily needs by food group as listed below.  3. Continue to get adequate hydration, aiming for 64-96 oz/day.    Estimated Daily Needs By Food Group For Weight Loss  - 1.5 cups of fruit (1 cup of fruit is equivalent to 1 cup raw/frozen/cooked/canned fruit, 1/2 cup dried fruit, or 1 cup 100% fruit juice)  - 2 cups of vegetables (1 cup of vegetables is equivalent to 1 cup raw/cooked/canned vegetables, 2 cups leafy salad greens, or 1 cup 100% vegetable juice)  - 5 oz of grains (1 oz of grains is equivalent to 1 slice of bread or 1/2 cup of cooked rice, pasta or cereal)  - 5 oz of protein (1 oz of protein is equivalent to 1 egg, 1 tbsp peanut butter, 1/4 cup cooked beans/peas/lentils, or 1 oz seafood/meat/poultry. 3 oz of seafood/meat/poultry is about the size of a deck of cards)  - 3 cups dairy (1 cup of dairy is equivalent to 1 cup of milk, yogurt, cottage cheese, 1.5 oz hard cheese, or 1/3 cup shredded cheese)  - Fat in moderation    Additional Resources:  The Plate Method  [email or web address]://[email or web address]    Protein Sources   http://[employer]/[email or web address]     Carbohydrates  [email or web address]     Mindful Eating  http://[employer]/[email or web address]     Summary of Volumetrics Eating Plan  [email or web address]
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
## 2024-08-07 ENCOUNTER — MYC MEDICAL ADVICE (OUTPATIENT)
Dept: FAMILY MEDICINE | Facility: CLINIC | Age: 49
End: 2024-08-07
Payer: COMMERCIAL

## 2024-08-07 DIAGNOSIS — F33.0 MILD EPISODE OF RECURRENT MAJOR DEPRESSIVE DISORDER (H): Primary | ICD-10-CM

## 2024-08-07 RX ORDER — VILAZODONE HYDROCHLORIDE 20 MG/1
40 TABLET ORAL DAILY
Qty: 180 TABLET | Refills: 3 | Status: SHIPPED | OUTPATIENT
Start: 2024-08-07

## 2024-08-16 ENCOUNTER — TELEPHONE (OUTPATIENT)
Dept: FAMILY MEDICINE | Facility: CLINIC | Age: 49
End: 2024-08-16
Payer: COMMERCIAL

## 2024-08-16 NOTE — TELEPHONE ENCOUNTER
Hi Dr Srinivasan,     I still do not see this patient's hysterectomy records. Would you like me to keep the pap smear HM set at 5 years at this time? Thanks!     Aisha Baltazar RN BSN, Pap Tracking

## 2024-08-30 ENCOUNTER — ANCILLARY PROCEDURE (OUTPATIENT)
Dept: MAMMOGRAPHY | Facility: CLINIC | Age: 49
End: 2024-08-30
Payer: COMMERCIAL

## 2024-08-30 DIAGNOSIS — Z12.31 VISIT FOR SCREENING MAMMOGRAM: ICD-10-CM

## 2024-08-30 PROCEDURE — 77063 BREAST TOMOSYNTHESIS BI: CPT | Mod: TC | Performed by: RADIOLOGY

## 2024-08-30 PROCEDURE — 77067 SCR MAMMO BI INCL CAD: CPT | Mod: TC | Performed by: RADIOLOGY

## 2024-10-22 ENCOUNTER — TELEPHONE (OUTPATIENT)
Dept: ENDOCRINOLOGY | Facility: CLINIC | Age: 49
End: 2024-10-22
Payer: COMMERCIAL

## 2024-10-22 NOTE — TELEPHONE ENCOUNTER
Patient confirmed scheduled appointment:  Date: 11/19/24  Time: 9 am  Visit type: DOUGLAS DIAMOND   Provider: Shanda Sanches  Location: [facility] - 4th floor  Testing/imaging: n/a  Additional notes: n/a

## 2024-10-22 NOTE — TELEPHONE ENCOUNTER
Patient confirmed scheduled appointment:  Date: 11/18/24  Time: 9:30 am  Visit type: RET [facility] Nutrition  Provider: Jenny Pa  Location: virtual  Testing/imaging: n/a  Additional notes: n/a

## 2024-10-23 ENCOUNTER — TELEPHONE (OUTPATIENT)
Dept: ENDOCRINOLOGY | Facility: CLINIC | Age: 49
End: 2024-10-23
Payer: COMMERCIAL

## 2024-10-23 NOTE — TELEPHONE ENCOUNTER
MTM appointment no showed, we made one more attempt to reschedule.     Routing back to referring provider and MTM Pharmacist Team        Alix Castro  MTM

## 2024-11-06 ENCOUNTER — OFFICE VISIT (OUTPATIENT)
Dept: FAMILY MEDICINE | Facility: CLINIC | Age: 49
End: 2024-11-06
Payer: COMMERCIAL

## 2024-11-06 VITALS
WEIGHT: 218 LBS | BODY MASS INDEX: 35.03 KG/M2 | TEMPERATURE: 99 F | RESPIRATION RATE: 20 BRPM | HEART RATE: 86 BPM | SYSTOLIC BLOOD PRESSURE: 128 MMHG | HEIGHT: 66 IN | DIASTOLIC BLOOD PRESSURE: 82 MMHG | OXYGEN SATURATION: 97 %

## 2024-11-06 DIAGNOSIS — E66.01 MORBID OBESITY (H): Primary | ICD-10-CM

## 2024-11-06 DIAGNOSIS — Z87.19 HISTORY OF FATTY INFILTRATION OF LIVER: ICD-10-CM

## 2024-11-06 DIAGNOSIS — E28.2 PCOS (POLYCYSTIC OVARIAN SYNDROME): ICD-10-CM

## 2024-11-06 DIAGNOSIS — G47.33 OBSTRUCTIVE SLEEP APNEA SYNDROME: ICD-10-CM

## 2024-11-06 DIAGNOSIS — Z23 ENCOUNTER FOR ADMINISTRATION OF VACCINE: ICD-10-CM

## 2024-11-06 PROCEDURE — 90471 IMMUNIZATION ADMIN: CPT | Performed by: STUDENT IN AN ORGANIZED HEALTH CARE EDUCATION/TRAINING PROGRAM

## 2024-11-06 PROCEDURE — 99213 OFFICE O/P EST LOW 20 MIN: CPT | Mod: 25 | Performed by: STUDENT IN AN ORGANIZED HEALTH CARE EDUCATION/TRAINING PROGRAM

## 2024-11-06 PROCEDURE — 90673 RIV3 VACCINE NO PRESERV IM: CPT | Performed by: STUDENT IN AN ORGANIZED HEALTH CARE EDUCATION/TRAINING PROGRAM

## 2024-11-06 NOTE — PATIENT INSTRUCTIONS
Luan Bolaños,    Thank you for allowing [facility] to manage your care.              For your convenience, test results are released as soon as they are available  Please allow 1-2 business days for me to send you a comment about your results.  If not done so, I encourage you to login into [employer] ([email or web address] to review your results in real time.     If you have any questions or concerns, please feel free to call us at [phone].    Sincerely,    Dr. Srinivasan    Did you know?      You can schedule a video visit for follow-up appointments as well as future appointments for certain conditions.  Please see the below link.     [email or web address]    If you have not already done so,  I encourage you to sign up for [employer] ([email or web address]).  This will allow you to review your results, securely communicate with a provider, and schedule virtual visits as well.

## 2024-11-06 NOTE — PROGRESS NOTES
"  Assessment & Plan     Morbid obesity (H)  Stable, increase from 1.7 to 2.4 mg  - Semaglutide-Weight Management (WEGOVY) 2.4 MG/0.75ML pen; Inject 2.4 mg subcutaneously once a week.    History of fatty infiltration of liver    - Semaglutide-Weight Management (WEGOVY) 2.4 MG/0.75ML pen; Inject 2.4 mg subcutaneously once a week.    PCOS (polycystic ovarian syndrome)  No concern at this time  - Semaglutide-Weight Management (WEGOVY) 2.4 MG/0.75ML pen; Inject 2.4 mg subcutaneously once a week.    Encounter for administration of vaccine    - INFLUENZA VACCINE TRIVALENT(FLUBLOK)    Obstructive sleep apnea syndrome  Stable  - Semaglutide-Weight Management (WEGOVY) 2.4 MG/0.75ML pen; Inject 2.4 mg subcutaneously once a week.    Follow-up in 3 months      BMI  Estimated body mass index is 35.71 kg/m  as calculated from the following:    Height as of this encounter: 1.664 m (5' 5.51\").    Weight as of this encounter: 98.9 kg (218 lb).   Weight management plan: Discussed healthy diet and exercise guidelines          Krystle Bolaños is a 49 year old, presenting for the following health issues:  Weight Mgmt      11/6/2024    10:04 AM   Additional Questions   Roomed by Tenisha PALACIOS         11/6/2024    10:04 AM   Patient Reported Additional Medications   Patient reports taking the following new medications No new medications to add     History of Present Illness       Reason for visit:  Weight Follow up    She eats 0-1 servings of fruits and vegetables daily.She consumes 0 sweetened beverage(s) daily.She exercises with enough effort to increase her heart rate 9 or less minutes per day.  She exercises with enough effort to increase her heart rate 3 or less days per week.   She is taking medications regularly.  Patient is here for obesity follow-up.  Co-morbidities include fatty liver, PCOS and STEVE.  Patient is currently taking Wegovy 1.7 mg weekly.          Review of Systems  Constitutional, HEENT, cardiovascular, pulmonary, gi " "and gu systems are negative, except as otherwise noted.      Objective    /82   Pulse 86   Temp 99  F (37.2  C) (Tympanic)   Resp 20   Ht 1.664 m (5' 5.51\")   Wt 98.9 kg (218 lb)   SpO2 97%   BMI 35.71 kg/m    Body mass index is 35.71 kg/m .  Physical Exam   GENERAL: alert and no distress  MS: no gross musculoskeletal defects noted, no edema            Signed Electronically by: Lorraine Srinivasan MD    "

## 2024-11-19 ENCOUNTER — OFFICE VISIT (OUTPATIENT)
Dept: ENDOCRINOLOGY | Facility: CLINIC | Age: 49
End: 2024-11-19
Payer: COMMERCIAL

## 2024-11-19 VITALS
WEIGHT: 216 LBS | DIASTOLIC BLOOD PRESSURE: 78 MMHG | SYSTOLIC BLOOD PRESSURE: 122 MMHG | HEIGHT: 65 IN | HEART RATE: 74 BPM | OXYGEN SATURATION: 99 % | BODY MASS INDEX: 35.99 KG/M2

## 2024-11-19 DIAGNOSIS — E66.01 CLASS 2 SEVERE OBESITY WITH SERIOUS COMORBIDITY AND BODY MASS INDEX (BMI) OF 37.0 TO 37.9 IN ADULT, UNSPECIFIED OBESITY TYPE (H): Primary | ICD-10-CM

## 2024-11-19 DIAGNOSIS — G47.33 OBSTRUCTIVE SLEEP APNEA SYNDROME: ICD-10-CM

## 2024-11-19 DIAGNOSIS — Z87.19 HISTORY OF FATTY INFILTRATION OF LIVER: ICD-10-CM

## 2024-11-19 DIAGNOSIS — E66.812 CLASS 2 SEVERE OBESITY WITH SERIOUS COMORBIDITY AND BODY MASS INDEX (BMI) OF 37.0 TO 37.9 IN ADULT, UNSPECIFIED OBESITY TYPE (H): Primary | ICD-10-CM

## 2024-11-19 DIAGNOSIS — E28.2 PCOS (POLYCYSTIC OVARIAN SYNDROME): ICD-10-CM

## 2024-11-19 ASSESSMENT — PAIN SCALES - GENERAL: PAINLEVEL_OUTOF10: NO PAIN (0)

## 2024-11-19 NOTE — PATIENT INSTRUCTIONS
Hydration goal: 64oz   Activity goal: 10 min on treadmill 5 days a week   Continue to work on increasing protein   Continue wegovy 2.4mg   In Feb- plan is to go to semaglutide compounded at 1mg - could add qsymia or phentermine/ topiramate individually to the semaglutide if needed   Follow up 3 months       TOPIRAMATE (TOPAMAX)    We are considering starting topiramate at bedtime.  Start one tab, 25 mg, for a week. Go up to 50 mg (2 tabs) for the next week. At the third week, take  3 tabs (75 mg).  Stay at 3 tabs until you are seen again.       Topiramate (Topamax) is a medication that is used most often to treat migraine headaches or for seizures. It has also been found to help with weight loss. Although it's not currently FDA approved for weight loss, it has been used safely for a number of years to help people who are carrying extra weight.     Just how topiramate helps with weight loss has not been exactly determined. However it seems to work on areas of the brain to quiet down signals related to eating.      Topiramate may make you:    >feel less interest in eating in between meals   >think less about food and eating   >find it easier to push the plate away   >find giving up pop easier    >have an easier time eating less    For some of our patients, the pills work right away. They feel and think quite differently about food. Other patients don't feel much of a change but find in fact they have lost weight! Like all weight loss medications, topiramate works best when you help it work.  This means:    1) Have less tempting high calorie (fattening) food around the house or office    2) Have lower calorie food (fruits, vegetables,low fat meats and dairy) for snacks    3) Eat out only one time or less each week.   4) Eat your meals at a table with the TV or computer off.    Side-effects. Topiramate is generally well tolerated. The main side-effects we see are:   Tingling in hands,feet, or face (usually not very  troublesome)   Mental confusion and word finding trouble (about 10% of patients have this.)     Feeling sleepy or a bit dopey- this goes away very soon after starting.    One of the dangers of topiramate is the possibility of birth defects--if you get pregnant when you are on it, there is the risk that your baby will be born with a cleft lip or palate.  If you are on topiramate and of child bearing age, you need to be on a reliable form of birth control or refrain from sexual intercourse.     Please refer to the pharmacy insert for more information on side-effects. Since many pharmacists are not familiar with the use of topiramate in weight loss, calling the clinic will get you the most accurate information on the use of this medication for weight loss.    For any questions or concerns please send a [employer] message to our team or call our weight management call center at [phone] during regular business hours. For questions during evenings or weekends your messages will be addressed during the next business day.  For emergencies please call 911 or seek immediate medical care.     (Do not stop taking it if you don't think it's working. For some people it works even though they do not feel much different.)     In order to get refills of this or any medication we prescribe you must be seen in the medical weight mgmt clinic every 3-6 months. Please have your pharmacy fax a refill request to [phone].    QSYMIA (phentermine and topiramate)    We are considering starting Qsymia. This is a specific obesity medication and is a combination of sustained-release Phentermine and Topiramate. Qsymia is taken once daily in the morning.  There are a few different doses of the medication. Doses come in 3.75/23 mg (usually skipped), 7.5/46 mg, 11.25/69 mg, and 15/92 mg.     For some of our patients, the pills work right away. They feel and think quite differently about food. Other patients don't feel much of a change but find  in fact they have lost weight! Like all weight loss medications, Qsymia works best when you help it work.  This means:    1) Have less tempting high calorie (fattening) food around the house or office    2) Have lower calorie food (fruits, vegetables,low fat meats and dairy) for snacks    3) Eat out only one time or less each week.   4) Eat your meals at a table with the TV or computer off.    Side-effects (generally well tolerated because it is a sustained release medication)    Topiramate:   -Tingling in hands,feet, or face (usually not very troublesome)   -Mental confusion and word finding trouble (about 10% of patients have this)     -Feeling sleepy or a bit dopey- this goes away very soon after starting      Phentermine:    -Feelings of racing pulse or rapid heart beat   -Increased anxiety/jitteriness   -Insomnia   -Dry mouth, constipation    -Some people can get an elevated blood pressure. Please have your blood pressure rechecked 1-2 weeks after starting Qsymia and report results back to the clinic via [employer].    One of the dangers of topiramate is the possibility of birth defects--if you get pregnant when you are on it, there is the risk that your baby will be born with a cleft lip or palate.  If you are on topiramate and of child bearing age, you need to be on a reliable form of birth control or refrain from sexual intercourse.     Prior Authorization Process for Weight Management Medications: You are being prescribed a medication that will likely need to undergo a prior authorization.  A prior authorization is when the clinic needs to fill out a form that is sent to your insurance company to obtain coverage for that medication. The prescription will NOT automatically go to your pharmacy today if it needs a Prior Authorization. If the medication prior authorization is approved, the care team will contact you and the prescription will be released to your pharmacy. If denied, we will work to try and appeal  the prior authorization if possible. The initial prior authorization process takes up to 5-10 business days and appeals can take up to 30 days. If you do not hear from us at the end of that time, you may contact the clinic.    If insurance does not cover [employer], typically costs around $150-$200 per month. Please check out the website [email or web address] and sign up for the Pharmacy savings card or if you would like to use [employer] [facility]'s Home Delivery Pharmacy for a lower copay.     For any questions or concerns please send a [employer] message to our team or call our weight management call center at [phone] during regular business hours. For questions during evenings or weekends your messages will be addressed during the next business day.  For emergencies please call 911 or seek immediate medical care.     Do not stop taking it if you don't think it's working. For some people it works even though they do not feel much different.    In order to get refills of this or any medication we prescribe you must be seen in the medical weight mgmt clinic every 2-4 months. Please have your pharmacy fax a refill request to [phone].

## 2024-11-19 NOTE — PROGRESS NOTES
Return Medical Weight Management Note     Mami Epps  MRN:  [id number]  :  1975  SAMUEL:  2024    Dear Lorraine Srinivasan MD,    I had the pleasure of seeing your patient Mami Epps. She is a 49 year old female who I am continuing to see for treatment of obesity related to:        2024    10:24 AM   --   I have the following health issues associated with obesity Sleep Apnea    Polycystic Ovarian Syndrome    Infertility   I have the following symptoms associated with obesity Infertility (Difficulty Getting Pregnant)    Depression    Fatigue       Assessment & Plan   Problem List Items Addressed This Visit          Respiratory    Sleep apnea    Relevant Orders    Adult Bariatrics and Weight Management Clinic Follow-Up Order       Digestive    Class 2 severe obesity with serious comorbidity and body mass index (BMI) of 37.0 to 37.9 in adult, unspecified obesity type (H) - Primary         Has lost 4% of total body weight since starting Wegovy in 2024 with taper up to 2.4 mg.  Denies adverse side effects to Wegovy.  Finds Wegovy helpful with reducing comfort eating,reducing portion sizes, and most notably reducing searching for food and improving ability to experience satiety after meals.  Despite success on Wegovy she will be losing coverage for GLP-1 medications for weight loss in .  We discussed strategies to achieve weight maintenance and manage food noise once no longer having coverage.  Options include compounded semaglutide with [facility], oral medications like topiramate, phentermine and metformin.    Hydration goal: 64oz   Activity goal: 10 min on treadmill 5 days a week   Continue to work on increasing protein   Continue wegovy 2.4mg   In Feb- plan is to go to semaglutide compounded at 1mg - could add qsymia or phentermine/ topiramate individually to the semaglutide if needed   Follow up 3 months          Relevant Orders    Adult Bariatrics and  Weight Management Clinic Follow-Up Order       Endocrine    PCOS (polycystic ovarian syndrome)    Relevant Orders    Adult Bariatrics and Weight Management Clinic Follow-Up Order       Other    History of fatty infiltration of liver    Relevant Orders    Adult Bariatrics and Weight Management Clinic Follow-Up Order          INTERVAL HISTORY:    New DARA 6/2024- wegovy- initially paid out of pocket, [employer] insurance- losing coverage 2025     Anti-obesity medication history    Current:   Will be switching to 2.4mg wegovy this week   -no adverse side effects     Recent diet changes:   History of comfort eating - reduced since starting wegovy - can't eat as large of portions   In August, she had flare of comfort eating with court settlement   85% reduction in food searching/ inability to be satisfied   Increased protein   Working on hydration     Recent exercise/activity changes: had been going gym more frequently until August. Now, doing short walks with dogs     Vitamins/Labs: 4/2024     CURRENT WEIGHT:   216 lbs 0 oz    Initial Weight (lbs): 225 lbs  Last Visits Weight: 98.9 kg (218 lb)  Cumulative weight loss (lbs): 9  Weight Loss Percentage: 4%  Waist Circumference (cm): 111 cm        11/17/2024     5:03 PM   Changes and Difficulties   With regards to my diet, I am still struggling with: Eating at night   With regards to my activity/exercise, I am still struggling with: Making exercise a regular habit         MEDICATIONS:   Current Outpatient Medications   Medication Sig Dispense Refill    fluticasone (FLONASE) 50 MCG/ACT nasal spray Spray 2 sprays into both nostrils daily      hydrOXYzine HCl (ATARAX) 25 MG tablet Take 25 mg by mouth as needed      Semaglutide-Weight Management (WEGOVY) 2.4 MG/0.75ML pen Inject 2.4 mg subcutaneously once a week. 3 mL 3    vilazodone (VIIBRYD) 20 MG TABS tablet Take 2 tablets (40 mg) by mouth daily 180 tablet 3           11/17/2024     5:03 PM   Weight Loss Medication History  Reviewed With Patient   Which weight loss medications are you currently taking on a regular basis? Wegovy   Are you having any side effects from the weight loss medication that we have prescribed you? No       Office Visit on 05/17/2024   Component Date Value Ref Range Status    Trichomonas 05/17/2024 Absent  Absent Final    Yeast 05/17/2024 Absent  Absent Final    Clue Cells 05/17/2024 Absent  Absent Final    WBCs/high power field 05/17/2024 1+ (A)  None Final    Human Papilloma Virus 16 DNA 05/17/2024 Negative  Negative Final    Human Papilloma Virus 18 DNA 05/17/2024 Negative  Negative Final    Human Papilloma Virus Other 05/17/2024 Negative  Negative Final    FINAL DIAGNOSIS 05/17/2024    Final                    Value:This patient's sample is negative for high risk HPV DNA.                                                                                                                                  METHODOLOGY: The [employer] system uses automated extraction, simultaneous                           amplification of HPV (E6/E7 oncogenes) and beta-globin, followed by real                           time detection of fluorescent labeled HPV and beta globin using specific                           oligonucleotide probes. The test specifically identifies types HPV 16 DNA                           and HPV 18 DNA while concurrently detecting the rest of the high risk                           types (31, 33, 35, 39, 45, 51, 52, 56, 58, 59, 66 or 68).                                                     COMMENTS: This test is not intended for use as a screening device for                           woman under age 30 with normal cervical cytology. Results should be                           correlated with cytologic and histologic findings. Close clinical follow                           up is recommended.                              Interpretation 05/17/2024 Negative for Intraepithelial Lesion or Malignancy (NILM)    Final  "   Comment 05/17/2024    Final                    Value:                          Papanicolaou Test Limitations:  Cervical cytology is a screening test with                           limited sensitivity, and regular screening is critical for cancer                           prevention.  Pap tests are primarily effective for the                           diagnosis/prevention of squamous cell carcinoma, not adenocarcinoma or                           other cancers.                              Specimen Adequacy 05/17/2024 Satisfactory for evaluation, endocervical/transformation zone component present   Final    Clinical Information 05/17/2024    Final                    Value:none    Previous Abnormal? 05/17/2024    Final                    Value:No    Performing Labs 05/17/2024    Final                    Value:The technical component of this testing was completed at [facility].                                                    Stain controls for all stains resulted within this report have been                           reviewed and show appropriate reactivity.           6/11/2024    10:08 AM   BARTOLOME Score (Last Two)   BARTOLOME Raw Score 27   Activation Score 47.4   BARTOLOME Level 2         PHYSICAL EXAM:  Objective    /78 (BP Location: Left arm, Patient Position: Sitting, Cuff Size: Adult Regular)   Pulse 74   Ht 1.651 m (5' 5\")   Wt 98 kg (216 lb)   SpO2 99%   BMI 35.94 kg/m      /78 (BP Location: Left arm, Patient Position: Sitting, Cuff Size: Adult Regular)   Pulse 74   Ht 1.651 m (5' 5\")   Wt 98 kg (216 lb)   SpO2 99%   BMI 35.94 kg/m    Body mass index is 35.94 kg/m .  GENERAL: alert and no distress  EYES: Eyes grossly normal to inspection.  No discharge or erythema, or obvious scleral/conjunctival abnormalities.  RESP: No audible wheeze, cough, or visible cyanosis.    SKIN: Visible skin clear. No significant " rash, abnormal pigmentation or lesions.  NEURO: Cranial nerves grossly intact.  Mentation and speech appropriate for age.  PSYCH: Appropriate affect, tone, and pace of words        Sincerely,    Shanda Sanches NP      25 minutes spent by me on the date of the encounter doing chart review, history and exam, documentation and further activities per the note    The longitudinal plan of care for the diagnosis(es)/condition(s) as documented were addressed during this visit. Due to the added complexity in care, I will continue to support Mami in the subsequent management and with ongoing continuity of care.

## 2024-11-19 NOTE — NURSING NOTE
"(   Chief Complaint   Patient presents with    Follow Up     Return MW    )    ( Weight: 98 kg (216 lb) )  ( Height: 165.1 cm (5' 5\") )  ( BMI (Calculated): 35.94 )  (   )  (   )  (   )  (   )  (   )  (   )    ( BP: 122/78 )  (   )  (   )  (   )  ( Pulse: 74 )  (   )  ( SpO2: 99 % )    (   Patient Active Problem List   Diagnosis    Class 2 severe obesity with serious comorbidity and body mass index (BMI) of 37.0 to 37.9 in adult, unspecified obesity type (H)    Sleep apnea    PCOS (polycystic ovarian syndrome)    History of fatty infiltration of liver    )  (   Current Outpatient Medications   Medication Sig Dispense Refill    fluticasone (FLONASE) 50 MCG/ACT nasal spray Spray 2 sprays into both nostrils daily      hydrOXYzine HCl (ATARAX) 25 MG tablet Take 25 mg by mouth as needed      Semaglutide-Weight Management (WEGOVY) 2.4 MG/0.75ML pen Inject 2.4 mg subcutaneously once a week. 3 mL 3    vilazodone (VIIBRYD) 20 MG TABS tablet Take 2 tablets (40 mg) by mouth daily 180 tablet 3    Semaglutide-Weight Management (WEGOVY) 1.7 MG/0.75ML pen Inject 1.7 mg Subcutaneous once a week (Patient not taking: Reported on 11/19/2024) 3 mL 2    )  ( Diabetes Eval:    )    ( Pain Eval:  No Pain (0) )    ( Wound Eval:       )    (   History   Smoking Status    Never   Smokeless Tobacco    Never    )    ( Signed By:  Maribel Hoffmann; November 19, 2024; 8:49 AM )    " Statement Selected

## 2024-11-19 NOTE — LETTER
2024       RE: Mami Epps  [address]  [place] [address]     Dear Colleague,    Thank you for referring your patient, Mami Epps, to the [facility] WEIGHT MANAGEMENT [facility] [place] at [facility]. Please see a copy of my visit note below.      Return Medical Weight Management Note     Mami Epps  MRN:  [id number]  :  1975  SAMUEL:  2024    Dear Lorraine Srinivasan MD,    I had the pleasure of seeing your patient Mami Epps. She is a 49 year old female who I am continuing to see for treatment of obesity related to:        2024    10:24 AM   --   I have the following health issues associated with obesity Sleep Apnea    Polycystic Ovarian Syndrome    Infertility   I have the following symptoms associated with obesity Infertility (Difficulty Getting Pregnant)    Depression    Fatigue       Assessment & Plan  Problem List Items Addressed This Visit          Respiratory    Sleep apnea    Relevant Orders    Adult Bariatrics and Weight Management Clinic Follow-Up Order       Digestive    Class 2 severe obesity with serious comorbidity and body mass index (BMI) of 37.0 to 37.9 in adult, unspecified obesity type (H) - Primary         Has lost 4% of total body weight since starting Wegovy in 2024 with taper up to 2.4 mg.  Denies adverse side effects to Wegovy.  Finds Wegovy helpful with reducing comfort eating,reducing portion sizes, and most notably reducing searching for food and improving ability to experience satiety after meals.  Despite success on Wegovy she will be losing coverage for GLP-1 medications for weight loss in .  We discussed strategies to achieve weight maintenance and manage food noise once no longer having coverage.  Options include compounded semaglutide with [place] [facility], oral medications like topiramate, phentermine and metformin.    Hydration goal: 64oz   Activity  goal: 10 min on treadmill 5 days a week   Continue to work on increasing protein   Continue wegovy 2.4mg   In Feb- plan is to go to semaglutide compounded at 1mg - could add qsymia or phentermine/ topiramate individually to the semaglutide if needed   Follow up 3 months          Relevant Orders    Adult Bariatrics and Weight Management Clinic Follow-Up Order       Endocrine    PCOS (polycystic ovarian syndrome)    Relevant Orders    Adult Bariatrics and Weight Management Clinic Follow-Up Order       Other    History of fatty infiltration of liver    Relevant Orders    Adult Bariatrics and Weight Management Clinic Follow-Up Order          INTERVAL HISTORY:    New MWM 6/2024- wegovy- initially paid out of pocket, 2-[employer] insurance- losing coverage 2025     Anti-obesity medication history    Current:   Will be switching to 2.4mg wegovy this week   -no adverse side effects     Recent diet changes:   History of comfort eating - reduced since starting wegovy - can't eat as large of portions   In August, she had flare of comfort eating with court settlement   85% reduction in food searching/ inability to be satisfied   Increased protein   Working on hydration     Recent exercise/activity changes: had been going gym more frequently until August. Now, doing short walks with dogs     Vitamins/Labs: 4/2024     CURRENT WEIGHT:   216 lbs 0 oz    Initial Weight (lbs): 225 lbs  Last Visits Weight: 98.9 kg (218 lb)  Cumulative weight loss (lbs): 9  Weight Loss Percentage: 4%  Waist Circumference (cm): 111 cm        11/17/2024     5:03 PM   Changes and Difficulties   With regards to my diet, I am still struggling with: Eating at night   With regards to my activity/exercise, I am still struggling with: Making exercise a regular habit         MEDICATIONS:   Current Outpatient Medications   Medication Sig Dispense Refill     fluticasone (FLONASE) 50 MCG/ACT nasal spray Spray 2 sprays into both nostrils daily       hydrOXYzine HCl  (ATARAX) 25 MG tablet Take 25 mg by mouth as needed       Semaglutide-Weight Management (WEGOVY) 2.4 MG/0.75ML pen Inject 2.4 mg subcutaneously once a week. 3 mL 3     vilazodone (VIIBRYD) 20 MG TABS tablet Take 2 tablets (40 mg) by mouth daily 180 tablet 3           11/17/2024     5:03 PM   Weight Loss Medication History Reviewed With Patient   Which weight loss medications are you currently taking on a regular basis? Wegovy   Are you having any side effects from the weight loss medication that we have prescribed you? No       Office Visit on 05/17/2024   Component Date Value Ref Range Status     Trichomonas 05/17/2024 Absent  Absent Final     Yeast 05/17/2024 Absent  Absent Final     Clue Cells 05/17/2024 Absent  Absent Final     WBCs/high power field 05/17/2024 1+ (A)  None Final     Human Papilloma Virus 16 DNA 05/17/2024 Negative  Negative Final     Human Papilloma Virus 18 DNA 05/17/2024 Negative  Negative Final     Human Papilloma Virus Other 05/17/2024 Negative  Negative Final     FINAL DIAGNOSIS 05/17/2024    Final                    Value:This patient's sample is negative for high risk HPV DNA.                                                                                                                                  METHODOLOGY: The [employer] system uses automated extraction, simultaneous                           amplification of HPV (E6/E7 oncogenes) and beta-globin, followed by real                           time detection of fluorescent labeled HPV and beta globin using specific                           oligonucleotide probes. The test specifically identifies types HPV 16 DNA                           and HPV 18 DNA while concurrently detecting the rest of the high risk                           types (31, 33, 35, 39, 45, 51, 52, 56, 58, 59, 66 or 68).                                                     COMMENTS: This test is not intended for use as a screening device for                            "woman under age 30 with normal cervical cytology. Results should be                           correlated with cytologic and histologic findings. Close clinical follow                           up is recommended.                               Interpretation 05/17/2024 Negative for Intraepithelial Lesion or Malignancy (NILM)    Final     Comment 05/17/2024    Final                    Value:                          Papanicolaou Test Limitations:  Cervical cytology is a screening test with                           limited sensitivity, and regular screening is critical for cancer                           prevention.  Pap tests are primarily effective for the                           diagnosis/prevention of squamous cell carcinoma, not adenocarcinoma or                           other cancers.                               Specimen Adequacy 05/17/2024 Satisfactory for evaluation, endocervical/transformation zone component present   Final     Clinical Information 05/17/2024    Final                    Value:none     Previous Abnormal? 05/17/2024    Final                    Value:No     Performing Labs 05/17/2024    Final                    Value:The technical component of this testing was completed at [facility].                                                    Stain controls for all stains resulted within this report have been                           reviewed and show appropriate reactivity.           6/11/2024    10:08 AM   BARTOLOME Score (Last Two)   BARTOLOME Raw Score 27   Activation Score 47.4   BARTOLOME Level 2         PHYSICAL EXAM:  Objective   /78 (BP Location: Left arm, Patient Position: Sitting, Cuff Size: Adult Regular)   Pulse 74   Ht 1.651 m (5' 5\")   Wt 98 kg (216 lb)   SpO2 99%   BMI 35.94 kg/m      /78 (BP Location: Left arm, Patient Position: Sitting, Cuff Size: Adult Regular)   Pulse 74   Ht 1.651 m (5' " "5\")   Wt 98 kg (216 lb)   SpO2 99%   BMI 35.94 kg/m    Body mass index is 35.94 kg/m .  GENERAL: alert and no distress  EYES: Eyes grossly normal to inspection.  No discharge or erythema, or obvious scleral/conjunctival abnormalities.  RESP: No audible wheeze, cough, or visible cyanosis.    SKIN: Visible skin clear. No significant rash, abnormal pigmentation or lesions.  NEURO: Cranial nerves grossly intact.  Mentation and speech appropriate for age.  PSYCH: Appropriate affect, tone, and pace of words        Sincerely,    Shanda Sanches NP      25 minutes spent by me on the date of the encounter doing chart review, history and exam, documentation and further activities per the note    The longitudinal plan of care for the diagnosis(es)/condition(s) as documented were addressed during this visit. Due to the added complexity in care, I will continue to support Mami in the subsequent management and with ongoing continuity of care.      Again, thank you for allowing me to participate in the care of your patient.      Sincerely,    Shanda Sanches NP    "

## 2024-11-21 NOTE — ASSESSMENT & PLAN NOTE
Has lost 4% of total body weight since starting Wegovy in June 2024 with taper up to 2.4 mg.  Denies adverse side effects to Wegovy.  Finds Wegovy helpful with reducing comfort eating,reducing portion sizes, and most notably reducing searching for food and improving ability to experience satiety after meals.  Despite success on Wegovy she will be losing coverage for GLP-1 medications for weight loss in 2025.  We discussed strategies to achieve weight maintenance and manage food noise once no longer having coverage.  Options include compounded semaglutide with [facility], oral medications like topiramate, phentermine and metformin.    Hydration goal: 64oz   Activity goal: 10 min on treadmill 5 days a week   Continue to work on increasing protein   Continue wegovy 2.4mg   In Feb- plan is to go to semaglutide compounded at 1mg - could add qsymia or phentermine/ topiramate individually to the semaglutide if needed   Follow up 3 months

## 2024-11-25 ENCOUNTER — MYC MEDICAL ADVICE (OUTPATIENT)
Dept: FAMILY MEDICINE | Facility: CLINIC | Age: 49
End: 2024-11-25
Payer: COMMERCIAL

## 2024-11-25 ENCOUNTER — E-VISIT (OUTPATIENT)
Dept: FAMILY MEDICINE | Facility: CLINIC | Age: 49
End: 2024-11-25
Payer: COMMERCIAL

## 2024-11-25 ENCOUNTER — TELEPHONE (OUTPATIENT)
Dept: ENDOCRINOLOGY | Facility: CLINIC | Age: 49
End: 2024-11-25
Payer: COMMERCIAL

## 2024-11-25 DIAGNOSIS — J45.22 MILD INTERMITTENT ASTHMA WITH STATUS ASTHMATICUS: ICD-10-CM

## 2024-11-25 NOTE — TELEPHONE ENCOUNTER
Left Voicemail (1st Attempt) and Sent Mychart (1st Attempt) for the patient to call back and schedule the following:    Appointment type: Return Weight Management  Appointment mode: In Person or Virtual Visit  Provider: Shanda Sanches NP  Return date: 2/19/25  Specialty phone number: [phone]

## 2024-11-25 NOTE — TELEPHONE ENCOUNTER
Bebot sent.    Jorge Monaco, RN  Triage Nurse  [facility], [facility]

## 2024-11-26 ENCOUNTER — OFFICE VISIT (OUTPATIENT)
Dept: FAMILY MEDICINE | Facility: CLINIC | Age: 49
End: 2024-11-26
Payer: COMMERCIAL

## 2024-11-26 VITALS
WEIGHT: 217.6 LBS | DIASTOLIC BLOOD PRESSURE: 70 MMHG | RESPIRATION RATE: 18 BRPM | TEMPERATURE: 97 F | HEART RATE: 91 BPM | HEIGHT: 66 IN | SYSTOLIC BLOOD PRESSURE: 120 MMHG | OXYGEN SATURATION: 96 % | BODY MASS INDEX: 34.97 KG/M2

## 2024-11-26 DIAGNOSIS — L20.84 INTRINSIC ECZEMA: ICD-10-CM

## 2024-11-26 DIAGNOSIS — J45.22 MILD INTERMITTENT ASTHMA WITH STATUS ASTHMATICUS: Primary | ICD-10-CM

## 2024-11-26 DIAGNOSIS — J30.2 SEASONAL ALLERGIC RHINITIS, UNSPECIFIED TRIGGER: ICD-10-CM

## 2024-11-26 PROCEDURE — 99214 OFFICE O/P EST MOD 30 MIN: CPT | Performed by: NURSE PRACTITIONER

## 2024-11-26 RX ORDER — ALBUTEROL SULFATE 90 UG/1
2 INHALANT RESPIRATORY (INHALATION) EVERY 6 HOURS PRN
Qty: 18 G | Refills: 3 | Status: SHIPPED | OUTPATIENT
Start: 2024-11-26 | End: 2024-11-26

## 2024-11-26 RX ORDER — ALBUTEROL SULFATE 90 UG/1
2 INHALANT RESPIRATORY (INHALATION) EVERY 6 HOURS PRN
Qty: 18 G | Refills: 3 | Status: SHIPPED | OUTPATIENT
Start: 2024-11-26

## 2024-11-26 ASSESSMENT — ENCOUNTER SYMPTOMS: COUGH: 1

## 2024-11-26 ASSESSMENT — PATIENT HEALTH QUESTIONNAIRE - PHQ9
SUM OF ALL RESPONSES TO PHQ QUESTIONS 1-9: 0
SUM OF ALL RESPONSES TO PHQ QUESTIONS 1-9: 0
10. IF YOU CHECKED OFF ANY PROBLEMS, HOW DIFFICULT HAVE THESE PROBLEMS MADE IT FOR YOU TO DO YOUR WORK, TAKE CARE OF THINGS AT HOME, OR GET ALONG WITH OTHER PEOPLE: NOT DIFFICULT AT ALL

## 2024-11-26 NOTE — PROGRESS NOTES
Assessment & Plan     Mild intermittent asthma with status asthmaticus    - albuterol (PROAIR HFA/PROVENTIL HFA/VENTOLIN HFA) 108 (90 Base) MCG/ACT inhaler; Inhale 2 puffs into the lungs every 6 hours as needed for shortness of breath, wheezing or cough.  - Spirometry, Breath Capacity:  Future Order, RT Performed; Future    Seasonal allergic rhinitis, unspecified trigger    - Adult Allergy/Asthma  Referral; Future            See Patient Instructions    Krystle Bolaños is a 49 year old, presenting for the following health issues:  Asthma and Cough  She reports history of allergies since she was a child.  Uses Flonase occasionally Zyrtec as well.  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  Mental health doing well.  No recent illness.  Does get eczema occasionally on her hairline.  Yesterday when she came home from work she had a hearty laugh and then was coughing and wheezing, cough was productive her chest felt tight and would not go away. Denies chest pain.  Her nephew has a nebulizer and she did an albuterol neb and symptoms improved.  Looking back she feels she has had this happen approximately 5 times in the last year.  She has never smoked.  Due to the severity of symptoms yesterday she believes she needs to address this at this time.  Discussed asthma triggers, preventing asthma attacks, tips given on after visit summary.  Will place order for albuterol, spirometry testing and allergist.  If needing albuterol more frequently may need controller inhaler.        11/26/2024     9:04 AM   Additional Questions   Roomed by Stephanie PALACIOS     Cough    History of Present Illness       Reason for visit:  Wheezing  Symptom onset:  1-3 days ago   She is taking medications regularly.         Review of Systems  Constitutional, HEENT, cardiovascular, pulmonary, GI, , musculoskeletal, neuro, skin, endocrine and psych systems are negative, except as otherwise noted.      Objective    /70   Pulse 91    "Temp 97  F (36.1  C) (Temporal)   Resp 18   Ht 1.67 m (5' 5.75\")   Wt 98.7 kg (217 lb 9.6 oz)   SpO2 96%   BMI 35.39 kg/m    Body mass index is 35.39 kg/m .  Physical Exam   GENERAL: alert and no distress  EYES: Eyes grossly normal to inspection  RESP: lungs clear to auscultation - no rales, rhonchi or wheezes  CV: regular rate and rhythm, normal S1 S2, no S3 or S4, no murmur, click or rub, no peripheral edema  MS: no edema  SKIN: no significant rash  NEURO: mentation intact and speech normal  PSYCH: mentation appears normal, affect normal/bright    See orders        Signed Electronically by: GISEL CODY    "

## 2024-11-26 NOTE — LETTER
My Asthma Action Plan    Name: Mami Epps   YOB: 1975  Date: 11/26/2024   My doctor: BLAIR BAH NP   My clinic: [facility]        My Rescue Medicine:   Albuterol inhaler (Proair/Ventolin/Proventil HFA)  2-4 puffs EVERY 4 HOURS as needed. Use a spacer if recommended by your provider.   My Asthma Severity:   Intermittent / Exercise Induced  Know your asthma triggers: upper respiratory infections, dust mites, and cold air             GREEN ZONE   Good Control  I feel good  No cough or wheeze  Can work, sleep and play without asthma symptoms       Take your asthma control medicine every day.     If exercise triggers your asthma, take your rescue medication  15 minutes before exercise or sports, and  During exercise if you have asthma symptoms  Spacer to use with inhaler: If you have a spacer, make sure to use it with your inhaler             YELLOW ZONE Getting Worse  I have ANY of these:  I do not feel good  Cough or wheeze  Chest feels tight  Wake up at night   Keep taking your Green Zone medications  Start taking your rescue medicine:  every 20 minutes for up to 1 hour. Then every 4 hours for 24-48 hours.  If you stay in the Yellow Zone for more than 12-24 hours, contact your doctor.  If you do not return to the Green Zone in 12-24 hours or you get worse, start taking your oral steroid medicine if prescribed by your provider.           RED ZONE Medical Alert - Get Help  I have ANY of these:  I feel awful  Medicine is not helping  Breathing getting harder  Trouble walking or talking  Nose opens wide to breathe       Take your rescue medicine NOW  If your provider has prescribed an oral steroid medicine, start taking it NOW  Call your doctor NOW  If you are still in the Red Zone after 20 minutes and you have not reached your doctor:  Take your rescue medicine again and  Call 911 or go to the emergency room right away    See your regular doctor within 2 weeks of an  Emergency Room or Urgent Care visit for follow-up treatment.          Annual Reminders:  Meet with Asthma Educator,  Flu Shot in the Fall, consider Pneumonia Vaccination for patients with asthma (aged 19 and older).    Pharmacy:    [id number] - [place], [place] - [address] AT [facility] & [address] [place] [address] [facility] - [place], [place] - [address]    Electronically signed by BLAIR BAH NP   Date: 11/26/24                    Asthma Triggers  How To Control Things That Make Your Asthma Worse    Triggers are things that make your asthma worse.  Look at the list below to help you find your triggers and   what you can do about them. You can help prevent asthma flare-ups by staying away from your triggers.      Trigger                                                          What you can do   Cigarette Smoke  Tobacco smoke can make asthma worse. Do not allow smoking in your home, car or around you.  Be sure no one smokes at a child s day care or school.  If you smoke, ask your health care provider for ways to help you quit.  Ask family members to quit too.  Ask your health care provider for a referral to Quit Plan to help you quit smoking, or call 2-318-989-PLAN.     Colds, Flu, Bronchitis  These are common triggers of asthma. Wash your hands often.  Don t touch your eyes, nose or mouth.  Get a flu shot every year.     Dust Mites  These are tiny bugs that live in cloth or carpet. They are too small to see. Wash sheets and blankets in hot water every week.   Encase pillows and mattress in dust mite proof covers.  Avoid having carpet if you can. If you have carpet, vacuum weekly.   Use a dust mask and HEPA vacuum.   Pollen and Outdoor Mold  Some people are allergic to trees, grass, or weed pollen, or molds. Try to keep your windows closed.  Limit time out doors when pollen count is high.   Ask you health care provider about taking medicine during allergy season.      Animal Dander  Some people are allergic to skin flakes, urine or saliva from pets with fur or feathers. Keep pets with fur or feathers out of your home.    If you can t keep the pet outdoors, then keep the pet out of your bedroom.  Keep the bedroom door closed.  Keep pets off cloth furniture and away from stuffed toys.     Mice, Rats, and Cockroaches  Some people are allergic to the waste from these pests.   Cover food and garbage.  Clean up spills and food crumbs.  Store grease in the refrigerator.   Keep food out of the bedroom.   Indoor Mold  This can be a trigger if your home has high moisture. Fix leaking faucets, pipes, or other sources of water.   Clean moldy surfaces.  Dehumidify basement if it is damp and smelly.   Smoke, Strong Odors, and Sprays  These can reduce air quality. Stay away from strong odors and sprays, such as perfume, powder, hair spray, paints, smoke incense, paint, cleaning products, candles and new carpet.   Exercise or Sports  Some people with asthma have this trigger. Be active!  Ask your doctor about taking medicine before sports or exercise to prevent symptoms.    Warm up for 5-10 minutes before and after sports or exercise.     Other Triggers of Asthma  Cold air:  Cover your nose and mouth with a scarf.  Sometimes laughing or crying can be a trigger.  Some medicines and food can trigger asthma.

## 2024-11-26 NOTE — PATIENT INSTRUCTIONS
Thank you for choosing us for your care. I have placed an order for a prescription so that you can start treatment. View your full visit summary for details by clicking on the link below. Your pharmacist will able to address any questions you may have about the medication.     If you're not feeling better within 5-7 days, please schedule an appointment.  You can schedule an appointment right here in [facility], or call [phone]  If the visit is for the same symptoms as your eVisit, we'll refund the cost of your eVisit if seen within seven days.

## 2024-12-23 ENCOUNTER — VIRTUAL VISIT (OUTPATIENT)
Dept: FAMILY MEDICINE | Facility: CLINIC | Age: 49
End: 2024-12-23
Payer: COMMERCIAL

## 2024-12-23 ENCOUNTER — NURSE TRIAGE (OUTPATIENT)
Dept: FAMILY MEDICINE | Facility: CLINIC | Age: 49
End: 2024-12-23

## 2024-12-23 ENCOUNTER — TELEPHONE (OUTPATIENT)
Dept: ENDOCRINOLOGY | Facility: CLINIC | Age: 49
End: 2024-12-23
Payer: COMMERCIAL

## 2024-12-23 ENCOUNTER — LAB (OUTPATIENT)
Dept: LAB | Facility: CLINIC | Age: 49
End: 2024-12-23
Payer: COMMERCIAL

## 2024-12-23 DIAGNOSIS — R19.7 DIARRHEA, UNSPECIFIED TYPE: Primary | ICD-10-CM

## 2024-12-23 DIAGNOSIS — Z11.4 SCREENING FOR HIV (HUMAN IMMUNODEFICIENCY VIRUS): Primary | ICD-10-CM

## 2024-12-23 DIAGNOSIS — R19.7 DIARRHEA, UNSPECIFIED TYPE: ICD-10-CM

## 2024-12-23 DIAGNOSIS — Z11.59 NEED FOR HEPATITIS C SCREENING TEST: ICD-10-CM

## 2024-12-23 PROCEDURE — 87389 HIV-1 AG W/HIV-1&-2 AB AG IA: CPT

## 2024-12-23 PROCEDURE — 83690 ASSAY OF LIPASE: CPT

## 2024-12-23 PROCEDURE — 99214 OFFICE O/P EST MOD 30 MIN: CPT | Mod: 95 | Performed by: STUDENT IN AN ORGANIZED HEALTH CARE EDUCATION/TRAINING PROGRAM

## 2024-12-23 PROCEDURE — 80053 COMPREHEN METABOLIC PANEL: CPT

## 2024-12-23 PROCEDURE — 87507 IADNA-DNA/RNA PROBE TQ 12-25: CPT

## 2024-12-23 PROCEDURE — 86803 HEPATITIS C AB TEST: CPT

## 2024-12-23 PROCEDURE — 36415 COLL VENOUS BLD VENIPUNCTURE: CPT

## 2024-12-23 NOTE — TELEPHONE ENCOUNTER
Patient calling, reports loose stools since Friday.   No fever or vomiting, denies cramping or abdominal pain.   She says endocrine/wt mgmt clinic did not think the diarrhea is due to the Wegovy and advised she reach out to PCP.    See triage below:    SEE IN OFFICE OR VIDEO VISIT TODAY:  * You need to be examined today or have a video telemedicine visit.  * PCP OFFICE VISIT: Let me give you an appointment.  * TRIAGER OPTION - MAKE VIDEO VISIT APPOINTMENT: I am going to set up a video telemedicine visit for you.  * IF NO AVAILABLE OFFICE OR VIDEO APPOINTMENTS: You need to be seen in an Urgent Care Center. Go to the one at nearby. Go there today. A nearby Urgent Care Center is often a good source of care. Another choice magy to go to the Emergency Department.    Patient does not really want a visit or to go to Urgent Care as she is not feeling that bad, just worried about why she continues to have liquid stools without pain or cramping.       No openings in person or virtual at [facility], [facility], or  clinics.    Routed 2nd level triage to PCP Dr. Cuevas, perhaps add patient on, lab only stool testing, other advice?   I did advise BRAT diet, clear liquids/gatorade.       Caro THACKER RN  [facility] Triage    Reason for Disposition   MODERATE diarrhea (e.g., 4-6 times / day more than normal) and present > 48 hours (2 days)    Additional Information   Negative: Shock suspected (e.g., cold/pale/clammy skin, too weak to stand, low BP, rapid pulse)   Negative: Difficult to awaken or acting confused (e.g., disoriented, slurred speech)   Negative: Sounds like a life-threatening emergency to the triager   Negative: Vomiting also present and worse than the diarrhea   Negative: Blood in stool and without diarrhea   Negative: Diarrhea begins while taking an antibiotic by mouth (oral antibiotic)   Negative: SEVERE abdominal pain (e.g., excruciating) and present > 1 hour   Negative: SEVERE abdominal pain and age > 60 years    "Negative: Bloody, black, or tarry bowel movements  (Exception: Chronic-unchanged black-grey bowel movements and is taking iron pills or Pepto-Bismol.)   Negative: SEVERE diarrhea (e.g., 7 or more times / day more than normal) and age > 60 years   Negative: Constant abdominal pain lasting > 2 hours   Negative: Drinking very little and dehydration suspected (e.g., no urine > 12 hours, very dry mouth, very lightheaded)   Negative: Patient sounds very sick or weak to the triager    Answer Assessment - Initial Assessment Questions  1. DIARRHEA SEVERITY: \"How bad is the diarrhea?\" \"How many more stools have you had in the past 24 hours than normal?\"       6-7  2. ONSET: \"When did the diarrhea begin?\"       2-3 days ago (started Friday evening)  3. STOOL DESCRIPTION:  \"How loose or watery is the diarrhea?\" \"What is the stool color?\" \"Is there any blood or mucous in the stool?\"      Completely watery, brown color, no blood or mucus  4. VOMITING: \"Are you also vomiting?\" If Yes, ask: \"How many times in the past 24 hours?\"       no  5. ABDOMEN PAIN: \"Are you having any abdomen pain?\" If Yes, ask: \"What does it feel like?\" (e.g., crampy, dull, intermittent, constant)       No pain or cramping  6. ABDOMEN PAIN SEVERITY: If present, ask: \"How bad is the pain?\"  (e.g., Scale 1-10; mild, moderate, or severe)      no  7. ORAL INTAKE: If vomiting, \"Have you been able to drink liquids?\" \"How much liquids have you had in the past 24 hours?\"      Drinking plenty of fluids  8. HYDRATION: \"Any signs of dehydration?\" (e.g., dry mouth [not just dry lips], too weak to stand, dizziness, new weight loss) \"When did you last urinate?\"      Urinating normally, denies dizziness, urinated this AM  9. EXPOSURE: \"Have you traveled to a foreign country recently?\" \"Have you been exposed to anyone with diarrhea?\" \"Could you have eaten any food that was spoiled?\"      No travel, recently started \"[employer]\" home delivered meals  10. ANTIBIOTIC USE: \"Are " "you taking antibiotics now or have you taken antibiotics in the past 2 months?\"        no  11. OTHER SYMPTOMS: \"Do you have any other symptoms?\" (e.g., fever, blood in stool)        no  12. PREGNANCY: \"Is there any chance you are pregnant?\" \"When was your last menstrual period?\"        No, hysterectomy    Protocols used: Diarrhea-A-OH    "

## 2024-12-23 NOTE — TELEPHONE ENCOUNTER
General Call    Contacts       Contact Date/Time Type Contact Phone/Fax    12/23/2024 08:37 AM CST Phone (Incoming) Mami Epps (Self) [phone] (M)          Reason for Call:  Please call ASAP, believes having side effects from WEGOVY        Could we [facility] this information to you [facility] or would you prefer to receive a phone call?:   Patient would prefer a phone call   Okay to leave a detailed message?: Yes at Cell number on file:    Telephone Information:   Mobile [phone]

## 2024-12-23 NOTE — PROGRESS NOTES
Mami is a 49 year old who is being evaluated via a billable video visit.    How would you like to obtain your AVS? MyChart  If the video visit is dropped, the invitation should be resent by: Text to cell phone: [phone]  Will anyone else be joining your video visit? No      Assessment & Plan     Diarrhea, unspecified type  Etiology is uncertain. DDX discussed with patient. Medication induced  (patient is on wegovy)vs inflammatory or infectious etiology.  Recommend symptomatic treatment.  I advised patient to hold her Wegovy this week. She will go to the lab today to give a sample.   Warning signs reviewed with patient.Patient is advised to call or go to the ED if he experiences any of the warning signs.     Enteric Bacteria and Virus Panel by [facility] Stool; Future  - Comprehensive metabolic panel (BMP + Alb, Alk Phos, ALT, AST, Total. Bili, TP); Future  - Lipase; Future                  Subjective   Mami is a 49 year old, presenting for the following health issues:  Diarrhea        12/23/2024     3:43 PM   Additional Questions   Roomed by Leta   Accompanied by Self         12/23/2024     3:43 PM   Patient Reported Additional Medications   Patient reports taking the following new medications Tums(no longer taking)     HPI     Diarrhea  Onset/Duration: Friday 12/20/24  Description:       Consistency of stool: watery       Blood in stool: No       Number of loose stools past 24 hours: 10 in last 24 hours  Progression of Symptoms: same  Accompanying signs and symptoms:       Fever: No       Nausea/Vomiting: No       Abdominal pain: YES - Mild Left Sided Abdominal pain        Weight loss: No       Episodes of constipation: No  History   Ill contacts: No  Recent use of antibiotics: No  Recent travels: No  Recent medication-new or changes(Rx or OTC): No  Precipitating or alleviating factors: None  Therapies tried and outcome: Tums on Friday without relief         Review of Systems  Constitutional, HEENT, cardiovascular,  pulmonary, gi and gu systems are negative, except as otherwise noted.      Objective           Vitals:  No vitals were obtained today due to virtual visit.    Physical Exam   GENERAL: alert and no distress  EYES: Eyes grossly normal to inspection.  No discharge or erythema, or obvious scleral/conjunctival abnormalities.  RESP: No audible wheeze, cough, or visible cyanosis.      PSYCH: Appropriate affect, tone, and pace of words          Video-Visit Details    Type of service:  Video Visit   Originating Location (pt. Location): Home      Distant Location (provider location):  On-site  Platform used for Video Visit: Suzette  Signed Electronically by: Lorraine Srinivasan MD

## 2024-12-23 NOTE — TELEPHONE ENCOUNTER
Called patient and left a voicemail to return our call to the clinic.       Clemencia Marques RN on 12/23/2024 at 1:18 PM

## 2024-12-23 NOTE — PATIENT INSTRUCTIONS
Luan Bolaños,    Thank you for allowing [facility] to manage your care.    I ordered some blood work, please go to the laboratory to get your laboratory studies.        For your convenience, test results are released as soon as they are available  Please allow 1-2 business days for me to send you a comment about your results.  If not done so, I encourage you to login into [employer] ([email or web address] to review your results in real time.     If you have any questions or concerns, please feel free to call us at [phone].    Sincerely,    Dr. Srinivasan    Did you know?      You can schedule a video visit for follow-up appointments as well as future appointments for certain conditions.  Please see the below link.     [email or web address]    If you have not already done so,  I encourage you to sign up for [employer] ([email or web address]).  This will allow you to review your results, securely communicate with a provider, and schedule virtual visits as well.

## 2024-12-23 NOTE — TELEPHONE ENCOUNTER
Patient states she has had four days of watery diarrhea.  Had chicken five days ago.  States she has lots of bowel sounds and burping.  Suggested BRAT diet and electrolytes to counteract the diarrhea.  Instructed to contact PCP.  Patient has been on the same dose of Wegovy.  No increase at time of symptom development.  Patient will call PCP.

## 2024-12-24 LAB
ADV 40+41 DNA STL QL NAA+NON-PROBE: NEGATIVE
ALBUMIN SERPL BCG-MCNC: 4.3 G/DL (ref 3.5–5.2)
ALP SERPL-CCNC: 56 U/L (ref 40–150)
ALT SERPL W P-5'-P-CCNC: 22 U/L (ref 0–50)
ANION GAP SERPL CALCULATED.3IONS-SCNC: 12 MMOL/L (ref 7–15)
AST SERPL W P-5'-P-CCNC: 23 U/L (ref 0–45)
ASTRO TYP 1-8 RNA STL QL NAA+NON-PROBE: NEGATIVE
BILIRUB SERPL-MCNC: 0.3 MG/DL
BUN SERPL-MCNC: 10.6 MG/DL (ref 6–20)
C CAYETANENSIS DNA STL QL NAA+NON-PROBE: NEGATIVE
CALCIUM SERPL-MCNC: 9.2 MG/DL (ref 8.8–10.4)
CAMPYLOBACTER DNA SPEC NAA+PROBE: NEGATIVE
CHLORIDE SERPL-SCNC: 105 MMOL/L (ref 98–107)
CREAT SERPL-MCNC: 0.77 MG/DL (ref 0.51–0.95)
CRYPTOSP DNA STL QL NAA+NON-PROBE: NEGATIVE
E COLI O157 DNA STL QL NAA+NON-PROBE: NORMAL
E HISTOLYT DNA STL QL NAA+NON-PROBE: NEGATIVE
EAEC ASTA GENE ISLT QL NAA+PROBE: NEGATIVE
EC STX1+STX2 GENES STL QL NAA+NON-PROBE: NEGATIVE
EGFRCR SERPLBLD CKD-EPI 2021: >90 ML/MIN/1.73M2
EPEC EAE GENE STL QL NAA+NON-PROBE: NEGATIVE
ETEC LTA+ST1A+ST1B TOX ST NAA+NON-PROBE: NEGATIVE
G LAMBLIA DNA STL QL NAA+NON-PROBE: NEGATIVE
GLUCOSE SERPL-MCNC: 87 MG/DL (ref 70–99)
HCO3 SERPL-SCNC: 23 MMOL/L (ref 22–29)
HCV AB SERPL QL IA: NONREACTIVE
HIV 1+2 AB+HIV1 P24 AG SERPL QL IA: NONREACTIVE
LIPASE SERPL-CCNC: 44 U/L (ref 13–60)
NOROVIRUS GI+II RNA STL QL NAA+NON-PROBE: NEGATIVE
P SHIGELLOIDES DNA STL QL NAA+NON-PROBE: NEGATIVE
POTASSIUM SERPL-SCNC: 3.5 MMOL/L (ref 3.4–5.3)
PROT SERPL-MCNC: 6.9 G/DL (ref 6.4–8.3)
RVA RNA STL QL NAA+NON-PROBE: NEGATIVE
SALMONELLA SP RPOD STL QL NAA+PROBE: NEGATIVE
SAPO I+II+IV+V RNA STL QL NAA+NON-PROBE: NEGATIVE
SHIGELLA SP+EIEC IPAH ST NAA+NON-PROBE: NEGATIVE
SODIUM SERPL-SCNC: 140 MMOL/L (ref 135–145)
V CHOLERAE DNA SPEC QL NAA+PROBE: NEGATIVE
VIBRIO DNA SPEC NAA+PROBE: NEGATIVE
Y ENTEROCOL DNA STL QL NAA+PROBE: NEGATIVE

## 2024-12-26 ENCOUNTER — MYC MEDICAL ADVICE (OUTPATIENT)
Dept: FAMILY MEDICINE | Facility: CLINIC | Age: 49
End: 2024-12-26
Payer: COMMERCIAL

## 2024-12-26 DIAGNOSIS — R19.7 DIARRHEA, UNSPECIFIED TYPE: Primary | ICD-10-CM

## 2024-12-28 ENCOUNTER — ANCILLARY PROCEDURE (OUTPATIENT)
Dept: CT IMAGING | Facility: CLINIC | Age: 49
End: 2024-12-28
Attending: STUDENT IN AN ORGANIZED HEALTH CARE EDUCATION/TRAINING PROGRAM
Payer: COMMERCIAL

## 2024-12-28 DIAGNOSIS — R19.7 DIARRHEA, UNSPECIFIED TYPE: ICD-10-CM

## 2024-12-28 PROCEDURE — 74176 CT ABD & PELVIS W/O CONTRAST: CPT | Performed by: STUDENT IN AN ORGANIZED HEALTH CARE EDUCATION/TRAINING PROGRAM

## 2025-01-14 ENCOUNTER — HOSPITAL ENCOUNTER (EMERGENCY)
Facility: HOSPITAL | Age: 50
Discharge: HOME | End: 2025-01-14
Attending: STUDENT IN AN ORGANIZED HEALTH CARE EDUCATION/TRAINING PROGRAM
Payer: COMMERCIAL

## 2025-01-14 VITALS
DIASTOLIC BLOOD PRESSURE: 77 MMHG | SYSTOLIC BLOOD PRESSURE: 161 MMHG | OXYGEN SATURATION: 99 % | TEMPERATURE: 99 F | HEART RATE: 67 BPM | RESPIRATION RATE: 16 BRPM

## 2025-01-14 DIAGNOSIS — S86.929A: Primary | ICD-10-CM

## 2025-01-14 DIAGNOSIS — M25.569 KNEE PAIN: ICD-10-CM

## 2025-01-14 DIAGNOSIS — S81.012A LACERATION OF LEFT KNEE, INITIAL ENCOUNTER: ICD-10-CM

## 2025-01-14 PROCEDURE — 96372 THER/PROPH/DIAG INJ SC/IM: CPT | Performed by: STUDENT IN AN ORGANIZED HEALTH CARE EDUCATION/TRAINING PROGRAM

## 2025-01-14 PROCEDURE — 63600175 PHARM REV CODE 636 W HCPCS: Performed by: STUDENT IN AN ORGANIZED HEALTH CARE EDUCATION/TRAINING PROGRAM

## 2025-01-14 PROCEDURE — 25000003 PHARM REV CODE 250: Performed by: STUDENT IN AN ORGANIZED HEALTH CARE EDUCATION/TRAINING PROGRAM

## 2025-01-14 PROCEDURE — 99284 EMERGENCY DEPT VISIT MOD MDM: CPT | Mod: 25

## 2025-01-14 PROCEDURE — 12034 INTMD RPR S/TR/EXT 7.6-12.5: CPT | Mod: LT

## 2025-01-14 PROCEDURE — 63600175 PHARM REV CODE 636 W HCPCS: Performed by: NURSE PRACTITIONER

## 2025-01-14 RX ORDER — CEPHALEXIN 500 MG/1
500 CAPSULE ORAL EVERY 8 HOURS
Qty: 20 CAPSULE | Refills: 0 | Status: SHIPPED | OUTPATIENT
Start: 2025-01-14 | End: 2025-01-21

## 2025-01-14 RX ORDER — CEFAZOLIN SODIUM 1 G/3ML
1 INJECTION, POWDER, FOR SOLUTION INTRAMUSCULAR; INTRAVENOUS
Status: COMPLETED | OUTPATIENT
Start: 2025-01-14 | End: 2025-01-14

## 2025-01-14 RX ORDER — OXYCODONE HYDROCHLORIDE 5 MG/1
5 TABLET ORAL EVERY 8 HOURS PRN
Qty: 9 TABLET | Refills: 0 | Status: SHIPPED | OUTPATIENT
Start: 2025-01-14 | End: 2025-01-17

## 2025-01-14 RX ORDER — ACETAMINOPHEN 500 MG
1000 TABLET ORAL
Status: COMPLETED | OUTPATIENT
Start: 2025-01-14 | End: 2025-01-14

## 2025-01-14 RX ORDER — LIDOCAINE HYDROCHLORIDE 10 MG/ML
5 INJECTION, SOLUTION EPIDURAL; INFILTRATION; INTRACAUDAL; PERINEURAL
Status: COMPLETED | OUTPATIENT
Start: 2025-01-14 | End: 2025-01-14

## 2025-01-14 RX ADMIN — LIDOCAINE HYDROCHLORIDE 50 MG: 10 INJECTION, SOLUTION EPIDURAL; INFILTRATION; INTRACAUDAL at 04:01

## 2025-01-14 RX ADMIN — ACETAMINOPHEN 1000 MG: 500 TABLET ORAL at 02:01

## 2025-01-14 RX ADMIN — CEFAZOLIN 1 G: 330 INJECTION, POWDER, FOR SOLUTION INTRAMUSCULAR; INTRAVENOUS at 02:01

## 2025-01-14 NOTE — DISCHARGE INSTRUCTIONS
As we discussed, follow up with orthopedist by calling for appointment to be seen in approximately 10 days (1-2 weeks) by Orthopedics in when you get home.  Keep the knee immobilizer in place whenever UR mobile, and bear weight only as tolerated, and do not attempt to bend the knee.  Whenever you are taking it off, be sure to keep the area otherwise covered, clean and dry.  After 2 days you can wash the area with gentle soap and water, I do not recommend anything that bubbles or burns such as peroxide or alcohol.  Please use the complete course of antibiotics.    Oxycodone in his a sedating and habit-forming medication, use it only as needed, and do not drive or operate machinery or mix with alcohol when taking it.  When you no longer need it, please discard the rest of it.    If you start having fever, drainage of pus, redness of the wound, worsening pain in the leg, your condition otherwise worsens in any way, return to the emergency department immediately.    For orthopedics follow up, patient had significant laceration with damage to the underlying structures including the underlying ligament.  This was repaired with a 3 layered repair (3-0 chromic) including suturing of the structure and deep tissues)  She was able to flex and extend the knee following repair, but to promote healing he is being placed in knee immobilizer, please follow up as indicated, thank you for your assistance.

## 2025-01-14 NOTE — ED TRIAGE NOTES
Pt presents to ED today reports laceration to left knee secondary to trip and fall   Denies head trauma   Last tetanus ~ 6 mos ago

## 2025-01-14 NOTE — ED PROVIDER NOTES
Encounter Date: 1/14/2025       History     Chief Complaint   Patient presents with    Fall     Trip and fall just PTA - presents awake, alert with deep laceration to left knee. Dressing in place.     HPI 49-year-old female no pertinent medical history presents brought in by self through triage for deep laceration to the left knee.    Patient is a nurse.  Review of patient's allergies indicates:   Allergen Reactions    Hay fever and allergy relief     Iodinated contrast media      Past Medical History:   Diagnosis Date    Anxiety     Basal cell carcinoma      Past Surgical History:   Procedure Laterality Date    COLONOSCOPY N/A 9/22/2022    Procedure: COLONOSCOPY;  Surgeon: Ashlie San MD;  [facility] END[facility];  Service: Endoscopy;  Laterality: N/A;    LUMBAR DISCECTOMY  2013    Robotic TLH/BS/sacrocolplexy  11/2020    [place]     Family History   Problem Relation Name Age of Onset    Colon cancer Mother      Diabetes Father      Breast cancer Paternal Grandmother      Ovarian cancer Neg Hx       Social History     Tobacco Use    Smoking status: Never     Passive exposure: Never    Smokeless tobacco: Never   Substance Use Topics    Alcohol use: Never    Drug use: Never   Medical surgical family social history reviewed  Review of Systems  Complete review of systems was conducted and was negative except as per HPI and as marked  Physical Exam     Initial Vitals [01/14/25 1306]   BP Pulse Resp Temp SpO2   (!) 161/77 67 16 98.5 °F (36.9 °C) 99 %      MAP       --         Physical Exam  Physical:  General: Awake, alert, no acute distress  Head: Normocephalic, atraumatic  Neck: Trachea midline, full range of motion, no meningismus  ENT: Atraumatic, Airway Patent  Cardio: Regular Rate, Regular Rhythm, skin perfusion normal  Chest: Atraumatic, No respiratory distress no use of accessory muscles to breath, normal rate  Upper Ext: Atraumatic, Inspection normal, no swelling  Lower Ext:  Left leg is warm well  perfused and neurovascularly intact, with a deep transverse laceration just distal to the patella, he is able to flex and extend the knee actively pain.  Neuro: No gross cranial nerve abnormality, no lateralization, no gross sensory or motor deficits  ED Course   Procedures  Labs Reviewed - No data to display       Imaging Results              X-Ray Knee 3 View Left (Final result)  Result time 01/14/25 14:41:43      Final result by Kei Rhoades MD (01/14/25 14:41:43)                   Impression:      1. No convincing acute displaced fracture or dislocation of the knee noting subcutaneous emphysema at the infrapatellar region concerning for laceration/open injury.      Electronically signed by: Kei Rhoades MD  Date:    01/14/2025  Time:    14:41               Narrative:    EXAMINATION:  XR KNEE 3 VIEW LEFT    CLINICAL HISTORY:  Pain in unspecified knee    TECHNIQUE:  AP, lateral, and Merchant views of the left knee were performed.    COMPARISON:  None    FINDINGS:  Two views left knee.    No acute displaced fracture or dislocation of the knee.  No radiopaque foreign body.  No large knee joint effusion.  There appears to be subcutaneous emphysema in the infrapatellar region.                                       Medications   LIDOcaine (PF) 10 mg/ml (1%) injection 50 mg (50 mg Infiltration Given 1/14/25 1636)   LIDOcaine (PF) 10 mg/ml (1%) injection 50 mg (50 mg Infiltration Given 1/14/25 1636)   acetaminophen tablet 1,000 mg (1,000 mg Oral Given 1/14/25 1434)   ceFAZolin injection 1 g (1 g Intramuscular Given 1/14/25 1434)     Medical Decision Making  Amount and/or Complexity of Data Reviewed  Radiology:  Decision-making details documented in ED Course.    Risk  OTC drugs.  Prescription drug management.               ED Course as of 01/14/25 1720   Tue Jan 14, 2025   1407 X-Ray Knee 3 View Left [DS]      ED Course User Index  [DS] Alberto Mccormick MD          49-year-old female presents for a laceration to  the left knee, it that does by examination not involve underlying structures however she is able to flex and extend the knee.  X-ray shows no open fracture.  Limb is warm well perfused and neurovascularly intact.  Risks benefits and alternatives were all discussed at length my traditional manner, we proceeded with repair, see procedure note.  Following this it was placed in knee immobilizer, provided with crutches, WBAT with knee immobilizer and follow up with Orthopedics was encouraged ([place] and we will be returning home for ongoing care.)         Procedure:  risks benefits and alternatives all discussed at length and traditional manner, consent obtained verbally to proceed.  Periprocedural antibiotics administered.  the area was irrigated with a 4 L washout initially prior to proceeding, the area was explored for foreign bodies and none were found, and a total of 10 cc of 1% lidocaine without epinephrine were used with adequate analgesia obtained prior to proceeding.  The length of the wound was approximately 10 cm.  On exam after washout under local anesthesia, it appears to have just neck both the prepatellar bursa and patellar tendon, the patella tendon remain mobile and was able to flex and extend the knee.  The visible portion of the bursa was closed with 3 sutures chromic gut in in buried interrupted fashion.  The proximally 1 cm laceration to the patellar ligament was likewise closed multiple sutures of 4 0 chromic gut in simple interrupted fashion.  Another 1 L sterile washout was performed.  Following that the subdermal/subcutaneous layers were closed in buried interrupted fashion again with 3-0 chromic gut.  The laceration was then closed with approximately 12 (late entry, I do not recall exact number) nylon nonabsorbable sutures.  To be Placed in knee immobilizer postprocedure.  Patient tolerated procedure without complication was neurovascularly intact after  procedure.        Clinical Impression:  Final diagnoses:  [M25.569] Knee pain  [S86.362Y] Laceration of tendon of knee (Primary)  [S81.012A] Laceration of left knee, initial encounter          ED Disposition Condition    Discharge Stable          ED Prescriptions       Medication Sig Dispense Start Date End Date Auth. Provider    cephALEXin (KEFLEX) 500 MG capsule Take 1 capsule (500 mg total) by mouth every 8 (eight) hours. for 7 days 20 capsule 1/14/2025 1/21/2025 Alberto Mccormick MD    oxyCODONE (ROXICODONE) 5 MG immediate release tablet Take 1 tablet (5 mg total) by mouth every 8 (eight) hours as needed. 9 tablet 1/14/2025 1/17/2025 Alberto Mccormick MD          Follow-up Information    None          Alberto Mccormick MD  01/19/25 0052

## 2025-01-14 NOTE — FIRST PROVIDER EVALUATION
Emergency Department TeleTriage Encounter Note      CHIEF COMPLAINT    Chief Complaint   Patient presents with    Fall     Trip and fall just PTA - presents awake, alert with deep laceration to left knee. Dressing in place.       VITAL SIGNS   Initial Vitals   BP Pulse Resp Temp SpO2   -- -- -- -- --      MAP       --            ALLERGIES    Review of patient's allergies indicates:   Allergen Reactions    Hay fever and allergy relief     Iodinated contrast media        PROVIDER TRIAGE NOTE  Trip and fall on stair immediately prior to arrival and landed on left knee. Has a large laceration to knee since fall. Has not taken any medication for pain. Tetanus within the last six months. Denies head injury, LOC.    Limited physical exam via telehealth: The patient is awake, alert, answering questions appropriately and is not in respiratory distress.  As the Teletriage provider, I performed an initial assessment and ordered appropriate labs and imaging studies, if any, to facilitate the patient's care once placed in the ED. Once a room is available, care and a full evaluation will be completed by an alternate ED provider.  Any additional orders and the final disposition will be determined by that provider.  All imaging and labs will not be followed-up by the Teletriage Team, including myself.          ORDERS  Labs Reviewed - No data to display    ED Orders (720h ago, onward)      Start Ordered     Status Ordering Provider    01/14/25 1300 01/14/25 1253  LIDOcaine (PF) 10 mg/ml (1%) injection 50 mg  ED 1 Time         Ordered KRISTIAN RUIZ    01/14/25 1253 01/14/25 1252  X-Ray Knee 3 View Left  1 time imaging         Ordered KRISTIAN RUIZ              Virtual Visit Note: The provider triage portion of this emergency department evaluation and documentation was performed via [employer], a HIPAA-compliant telemedicine application, in concert with a tele-presenter in the room. A face to face patient evaluation with  one of my colleagues will occur once the patient is placed in an emergency department room.      DISCLAIMER: This note was prepared with [employer] voice recognition transcription software. Garbled syntax, mangled pronouns, and other bizarre constructions may be attributed to that software system.

## 2025-01-16 ENCOUNTER — TELEPHONE (OUTPATIENT)
Dept: FAMILY MEDICINE | Facility: CLINIC | Age: 50
End: 2025-01-16
Payer: COMMERCIAL

## 2025-01-16 DIAGNOSIS — F33.0 MILD EPISODE OF RECURRENT MAJOR DEPRESSIVE DISORDER: ICD-10-CM

## 2025-01-16 RX ORDER — VILAZODONE HYDROCHLORIDE 20 MG/1
40 TABLET ORAL DAILY
Qty: 60 TABLET | Refills: 0 | Status: SHIPPED | OUTPATIENT
Start: 2025-01-16

## 2025-01-16 NOTE — TELEPHONE ENCOUNTER
Patient is out of town and forgot to bring her vilazodone (VIIBRYD) 20 MG TABS tablet. Patient is hoping to get a 6 day supply called into a pharmacy where she is at.  The pharmacy is [facility], [address] [place] [address].  Phone [phone].    MARSHAL Galloway  [facility]

## 2025-01-16 NOTE — TELEPHONE ENCOUNTER
RN called patient and relayed providers message. Patient verbalized understanding.       Clemencia Marques RN on 1/16/2025 at 5:01 PM

## 2025-01-16 NOTE — TELEPHONE ENCOUNTER
Patient calling to check on status of this script. She states she has been out for 2 days now and is experiencing some dizziness. RN discussed if experiencing severe dizzy spells or any other unusual sx she should be seen in ER/UC where she is. Patient verbalized understanding and stated she does not feel she needs to be seen yet.     She is hoping to get script sent in asap for a week supply and that she can start taking it again.       Please advise.      Clemencia Marques RN on 1/16/2025 at 4:46 PM

## 2025-01-17 NOTE — PROGRESS NOTES
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    HPI: Patient was out of town and took a fall and hit her knee right on the bone and she had to have surgery on her tendon and her bone was open to air so they started her on antibiotics.      Patient describes an ER procedure.  ED note is available and the procedure was done in the ER:  The length of the wound was approximately 10 cm. On exam after washout under local anesthesia, it appears to have just neck ('nicked\") both the prepatellar bursa and patellar tendon, the patella tendon remain mobile and was able to flex and extend the knee. The visible portion of the bursa was closed with 3 sutures chromic gut in in buried interrupted fashion. The proximally 1 cm laceration to the patellar ligament was likewise closed multiple sutures of 4 0 chromic gut in simple interrupted fashion.   Procedure was on 1/14.  Placed on Keflex 500 three times daily x 7 days. Last dose was 1/21.   She went to Urgent Care yesterday with concerns of infection was put on Bactrim, got IM Rocephin.    Present symptoms:   Much less pain than yesterday, less swelling.    Other PMH:  has a past medical history of Generalized anxiety disorder, History of basal cell carcinoma, Major depression, and STEVE (obstructive sleep apnea).    Surgical:  has a past surgical history that includes Hysterectomy, Pap No Longer Indicated and Discectomy lumbar anterior.    Family Hx:  family history includes Colon Cancer in her mother; Diabetes in her father.    Social Hx:  reports that she has never smoked. She has never been exposed to tobacco smoke. She has never used smokeless tobacco. She reports that she does not currently use alcohol. She reports that she does not use drugs.    REVIEW OF SYSTEMS:    CONSTITUTIONAL:  NEGATIVE for fever, chills, change in " weight  INTEGUMENTARY/SKIN:  NEGATIVE for worrisome rashes, moles or lesions  EYES:  NEGATIVE for vision changes or irritation  ENT/MOUTH:  NEGATIVE for ear, mouth and throat problems  RESP:  NEGATIVE for significant cough or SOB  BREAST:  NEGATIVE for masses, tenderness or discharge  CV:  NEGATIVE for chest pain, palpitations or peripheral edema  GI:  NEGATIVE for nausea, abdominal pain, heartburn, or change in bowel habits  :  Negative   MUSCULOSKELETAL:  See HPI above  NEURO:  NEGATIVE for weakness, dizziness or paresthesias  ENDOCRINE:  NEGATIVE for temperature intolerance, skin/hair changes  HEME/ALLERGY/IMMUNE:  NEGATIVE for bleeding problems  PSYCHIATRIC:  NEGATIVE for changes in mood or affect    PHYSICAL EXAM:  BP (!) 147/84 (BP Location: Left arm, Patient Position: Sitting, Cuff Size: Adult Large)   Pulse 88   SpO2 99%    GENERAL APPEARANCE: healthy, alert, and no distress     NEURO: Normal strength and tone, mentation intact, and speech normal  VASCULAR:   good pulses, and cappillary refill   LYMPH: no lymphadenopathy   PSYCH:  mentation appears normal and affect normal/bright  RESP: no increased work of breathing     KNEE EXAM:   SKIN: no suspicious lesions or rashes  Transverse wound,   Wound looks good, no erythema today.  Minimal puffiness of the prepatellar bursa  Alignment: normal   Patellofemoral joint: no extensor lag, or pain   Effusion: moderate  ROM: not tested   Tender: over anterior knee    X-RAY:  From yesterday 1/22/2025: The quad tendon shadow extends close to the proximal pole of the patella. No patella baja    MRI:  none     Impression: knee laceration, with partial patellar tendon laceration. Cellulitis, resolving    Plan: we need to monitor her knee for infection.  Continue antibiotics   Follow up 1 week maybe sutures out then  Work note written. Off x 3 weeks more, at least.   Knee immobilizer until wound healed, then maybe Tscope brace to prevent too much flexion.   Consider  physical therapy   She asked about an MRI, but I think with an intact SLR, the scan would not be necessary.  Wound re-dressed, knee immobilizer applied.   Change dressing daily.    ROMANA Lindo MD  Dept. Orthopedic Surgery  [facility]

## 2025-01-18 ASSESSMENT — KOOS JR
GOING UP OR DOWN STAIRS: SEVERE
HOW SEVERE IS YOUR KNEE STIFFNESS AFTER FIRST WAKING IN MORNING: SEVERE
BENDING TO THE FLOOR TO PICK UP OBJECT: SEVERE
STRAIGHTENING KNEE FULLY: SEVERE
RISING FROM SITTING: SEVERE
TWISING OR PIVOTING ON KNEE: SEVERE
KOOS JR SCORING: 36.93
STANDING UPRIGHT: MODERATE

## 2025-01-22 ENCOUNTER — OFFICE VISIT (OUTPATIENT)
Dept: URGENT CARE | Facility: URGENT CARE | Age: 50
End: 2025-01-22
Payer: COMMERCIAL

## 2025-01-22 ENCOUNTER — ANCILLARY PROCEDURE (OUTPATIENT)
Dept: GENERAL RADIOLOGY | Facility: CLINIC | Age: 50
End: 2025-01-22
Attending: STUDENT IN AN ORGANIZED HEALTH CARE EDUCATION/TRAINING PROGRAM
Payer: COMMERCIAL

## 2025-01-22 VITALS
HEART RATE: 86 BPM | SYSTOLIC BLOOD PRESSURE: 126 MMHG | DIASTOLIC BLOOD PRESSURE: 70 MMHG | OXYGEN SATURATION: 97 % | TEMPERATURE: 97 F | RESPIRATION RATE: 16 BRPM

## 2025-01-22 DIAGNOSIS — L03.116 CELLULITIS OF LEFT KNEE: Primary | ICD-10-CM

## 2025-01-22 DIAGNOSIS — S81.012D LACERATION OF LEFT KNEE, SUBSEQUENT ENCOUNTER: ICD-10-CM

## 2025-01-22 DIAGNOSIS — L03.116 CELLULITIS OF LEFT KNEE: ICD-10-CM

## 2025-01-22 LAB
BASOPHILS # BLD AUTO: 0 10E3/UL (ref 0–0.2)
BASOPHILS NFR BLD AUTO: 0 %
EOSINOPHIL # BLD AUTO: 0.4 10E3/UL (ref 0–0.7)
EOSINOPHIL NFR BLD AUTO: 4 %
ERYTHROCYTE [DISTWIDTH] IN BLOOD BY AUTOMATED COUNT: 12.8 % (ref 10–15)
HCT VFR BLD AUTO: 43.9 % (ref 35–47)
HGB BLD-MCNC: 14.9 G/DL (ref 11.7–15.7)
IMM GRANULOCYTES # BLD: 0 10E3/UL
IMM GRANULOCYTES NFR BLD: 0 %
LYMPHOCYTES # BLD AUTO: 1.9 10E3/UL (ref 0.8–5.3)
LYMPHOCYTES NFR BLD AUTO: 20 %
MCH RBC QN AUTO: 33.1 PG (ref 26.5–33)
MCHC RBC AUTO-ENTMCNC: 33.9 G/DL (ref 31.5–36.5)
MCV RBC AUTO: 98 FL (ref 78–100)
MONOCYTES # BLD AUTO: 0.6 10E3/UL (ref 0–1.3)
MONOCYTES NFR BLD AUTO: 7 %
NEUTROPHILS # BLD AUTO: 6.5 10E3/UL (ref 1.6–8.3)
NEUTROPHILS NFR BLD AUTO: 69 %
PLATELET # BLD AUTO: 346 10E3/UL (ref 150–450)
RBC # BLD AUTO: 4.5 10E6/UL (ref 3.8–5.2)
WBC # BLD AUTO: 9.5 10E3/UL (ref 4–11)

## 2025-01-22 PROCEDURE — 73560 X-RAY EXAM OF KNEE 1 OR 2: CPT | Mod: TC | Performed by: RADIOLOGY

## 2025-01-22 RX ORDER — CEFTRIAXONE SODIUM 1 G
1 VIAL (EA) INJECTION ONCE
Status: COMPLETED | OUTPATIENT
Start: 2025-01-22 | End: 2025-01-22

## 2025-01-22 RX ORDER — SULFAMETHOXAZOLE AND TRIMETHOPRIM 800; 160 MG/1; MG/1
1 TABLET ORAL 2 TIMES DAILY
Qty: 14 TABLET | Refills: 0 | Status: SHIPPED | OUTPATIENT
Start: 2025-01-22 | End: 2025-01-29

## 2025-01-22 RX ADMIN — Medication 1 G: at 10:40

## 2025-01-22 ASSESSMENT — PAIN SCALES - GENERAL: PAINLEVEL_OUTOF10: MILD PAIN (2)

## 2025-01-22 NOTE — PROGRESS NOTES
"Assessment & Plan     Cellulitis of left knee  Laceration of left knee, subsequent encounter  Patient presents urgent care with repaired laceration of the left knee that occurred while she was in [place] on vacation.  The laceration was complex and deep exposing the patella.  She has signs of cellulitis at the proximal portion of the laceration which started while she was finishing up a 7-day course of cephalexin.  X-ray of the knee shows no sign of extension of the infection into the joint or bone.  Advised treatment with broader spectrum antibiotic with Bactrim and a dose of Rocephin as she is having some constitutional symptoms of feeling \"off\" today which can sometimes be the early symptoms of spreading of the infection into the bloodstream so I did order a blood culture and did a culture of the drainage from the wound.  I advised her to go to the ER if she develops fevers, chills, spreading of the erythema proximally or linear erythema spreading proximally up the leg.  She has an appointment with orthopedics tomorrow for follow-up and further evaluation.  - sulfamethoxazole-trimethoprim (BACTRIM DS) 800-160 MG tablet  Dispense: 14 tablet; Refill: 0  - CBC with platelets and differential  - Blood Culture Peripheral Blood  - cefTRIAXone (ROCEPHIN) in lidocaine 1% for IM administration 1 g  - Skin Aerobic Bacterial Culture Routine With Gram Stain  - CBC with platelets and differential  - Blood Culture Arm, Right    40 minutes spent on the date of the encounter doing chart review, review of test results, interpretation of tests, patient visit, and documentation     No follow-ups on file.    CHIRAG Abernathy [facility]    Krystle Bolaños is a 49 year old female who presents to clinic today for the following health issues:  Chief Complaint   Patient presents with    Urgent Care    Knee Pain     Possible infection on left knee           1/22/2025    10:08 AM   Additional " Questions   Roomed by ca   Accompanied by self     HPI    No antibiotic allergies. Cephalexin x 7 days, last dose was yesterday. 3-layered repair including ligament. Surgery to left knee   Review of Systems  Constitutional, HEENT, cardiovascular, pulmonary, GI, , musculoskeletal, neuro, skin, endocrine and psych systems are negative, except as otherwise noted.      Objective    /70   Pulse 86   Temp 97  F (36.1  C) (Tympanic)   Resp 16   SpO2 97%   Physical Exam   GENERAL: alert and no distress  MS: Large laceration of the left anterior knee with intact sutures and erythema, induration, warmth and tendernss along the proximal portion of the laceration along with yellowish drainage  SKIN: see notes under MS above  NEURO: Normal strength and tone, mentation intact and speech normal  PSYCH: mentation appears normal, affect normal/bright    Results for orders placed or performed in visit on 01/22/25   XR Knee Left 1/2 Views     Status: None    Narrative    EXAM: XR KNEE LEFT 1/2 VIEWS  [facility]  DATE: 1/22/2025    INDICATION: complex deep laceration of left knee exposing patella with lac repair 7 days ago and new cellulitis and sx of possible sepsis,   COMPARISON: None.      Impression    IMPRESSION:      Normal left knee joint spacing and alignment. Negative for acute fracture. There is soft tissue swelling over the patella anteriorly. No sizable left knee joint effusion is evident radiographically. No radiographic evidence for osteomyelitis.   Results for orders placed or performed in visit on 01/22/25   CBC with platelets and differential     Status: Abnormal   Result Value Ref Range    WBC Count 9.5 4.0 - 11.0 10e3/uL    RBC Count 4.50 3.80 - 5.20 10e6/uL    Hemoglobin 14.9 11.7 - 15.7 g/dL    Hematocrit 43.9 35.0 - 47.0 %    MCV 98 78 - 100 fL    MCH 33.1 (H) 26.5 - 33.0 pg    MCHC 33.9 31.5 - 36.5 g/dL    RDW 12.8 10.0 - 15.0 %    Platelet Count 346 150 - 450 10e3/uL     % Neutrophils 69 %    % Lymphocytes 20 %    % Monocytes 7 %    % Eosinophils 4 %    % Basophils 0 %    % Immature Granulocytes 0 %    Absolute Neutrophils 6.5 1.6 - 8.3 10e3/uL    Absolute Lymphocytes 1.9 0.8 - 5.3 10e3/uL    Absolute Monocytes 0.6 0.0 - 1.3 10e3/uL    Absolute Eosinophils 0.4 0.0 - 0.7 10e3/uL    Absolute Basophils 0.0 0.0 - 0.2 10e3/uL    Absolute Immature Granulocytes 0.0 <=0.4 10e3/uL   Skin Aerobic Bacterial Culture Routine With Gram Stain     Status: None (Preliminary result)    Specimen: Knee, Left; Skin   Result Value Ref Range    Culture Culture in progress     Gram Stain Result No organisms seen     Gram Stain Result 1+ WBC seen    Blood Culture Arm, Right     Status: Normal (Preliminary result)    Specimen: Arm, Right; Blood   Result Value Ref Range    Culture No growth after 12 hours    CBC with platelets and differential     Status: Abnormal    Narrative    The following orders were created for panel order CBC with platelets and differential.  Procedure                               Abnormality         Status                     ---------                               -----------         ------                     CBC with platelets and d...[[id number]]  Abnormal            Final result                 Please view results for these tests on the individual orders.

## 2025-01-23 ENCOUNTER — OFFICE VISIT (OUTPATIENT)
Dept: ORTHOPEDICS | Facility: CLINIC | Age: 50
End: 2025-01-23
Payer: COMMERCIAL

## 2025-01-23 VITALS — OXYGEN SATURATION: 99 % | SYSTOLIC BLOOD PRESSURE: 147 MMHG | DIASTOLIC BLOOD PRESSURE: 84 MMHG | HEART RATE: 88 BPM

## 2025-01-23 DIAGNOSIS — S81.011A KNEE LACERATION, RIGHT, INITIAL ENCOUNTER: Primary | ICD-10-CM

## 2025-01-23 LAB
BACTERIA BLD CULT: NORMAL
BACTERIA SKIN AEROBE CULT: NORMAL
GRAM STAIN RESULT: NORMAL
GRAM STAIN RESULT: NORMAL

## 2025-01-23 RX ORDER — SENNOSIDES 8.6 MG
650 CAPSULE ORAL EVERY 8 HOURS PRN
COMMUNITY

## 2025-01-23 ASSESSMENT — PAIN SCALES - GENERAL: PAINLEVEL_OUTOF10: MILD PAIN (3)

## 2025-01-23 NOTE — LETTER
"1/23/2025      Mami Epps  [address] [place] [address]      Dear Colleague,    Thank you for referring your patient, Mami Epps, to the [facility]. Please see a copy of my visit note below.    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    HPI: Patient was out of town and took a fall and hit her knee right on the bone and she had to have surgery on her tendon and her bone was open to air so they started her on antibiotics.      Patient describes an ER procedure.  ED note is available and the procedure was done in the ER:  The length of the wound was approximately 10 cm. On exam after washout under local anesthesia, it appears to have just neck ('nicked\") both the prepatellar bursa and patellar tendon, the patella tendon remain mobile and was able to flex and extend the knee. The visible portion of the bursa was closed with 3 sutures chromic gut in in buried interrupted fashion. The proximally 1 cm laceration to the patellar ligament was likewise closed multiple sutures of 4 0 chromic gut in simple interrupted fashion.   Procedure was on 1/14.  Placed on Keflex 500 three times daily x 7 days. Last dose was 1/21.   She went to Urgent Care yesterday with concerns of infection was put on Bactrim, got IM Rocephin.    Present symptoms:   Much less pain than yesterday, less swelling.    Other PMH:  has a past medical history of Generalized anxiety disorder, History of basal cell carcinoma, Major depression, and STEVE (obstructive sleep apnea).    Surgical:  has a past surgical history that includes Hysterectomy, Pap No Longer Indicated and Discectomy lumbar anterior.    Family Hx:  family history includes Colon Cancer in her mother; Diabetes in her father.    Social Hx:  reports that she has never smoked. She has never been exposed to tobacco " smoke. She has never used smokeless tobacco. She reports that she does not currently use alcohol. She reports that she does not use drugs.    REVIEW OF SYSTEMS:    CONSTITUTIONAL:  NEGATIVE for fever, chills, change in weight  INTEGUMENTARY/SKIN:  NEGATIVE for worrisome rashes, moles or lesions  EYES:  NEGATIVE for vision changes or irritation  ENT/MOUTH:  NEGATIVE for ear, mouth and throat problems  RESP:  NEGATIVE for significant cough or SOB  BREAST:  NEGATIVE for masses, tenderness or discharge  CV:  NEGATIVE for chest pain, palpitations or peripheral edema  GI:  NEGATIVE for nausea, abdominal pain, heartburn, or change in bowel habits  :  Negative   MUSCULOSKELETAL:  See HPI above  NEURO:  NEGATIVE for weakness, dizziness or paresthesias  ENDOCRINE:  NEGATIVE for temperature intolerance, skin/hair changes  HEME/ALLERGY/IMMUNE:  NEGATIVE for bleeding problems  PSYCHIATRIC:  NEGATIVE for changes in mood or affect    PHYSICAL EXAM:  BP (!) 147/84 (BP Location: Left arm, Patient Position: Sitting, Cuff Size: Adult Large)   Pulse 88   SpO2 99%    GENERAL APPEARANCE: healthy, alert, and no distress     NEURO: Normal strength and tone, mentation intact, and speech normal  VASCULAR:   good pulses, and cappillary refill   LYMPH: no lymphadenopathy   PSYCH:  mentation appears normal and affect normal/bright  RESP: no increased work of breathing     KNEE EXAM:   SKIN: no suspicious lesions or rashes  Transverse wound,   Wound looks good, no erythema today.  Minimal puffiness of the prepatellar bursa  Alignment: normal   Patellofemoral joint: no extensor lag, or pain   Effusion: moderate  ROM: not tested   Tender: over anterior knee    X-RAY:  From yesterday 1/22/2025: The quad tendon shadow extends close to the proximal pole of the patella. No patella baja    MRI:  none     Impression: knee laceration, with partial patellar tendon laceration. Cellulitis, resolving    Plan: we need to monitor her knee for  infection.  Continue antibiotics   Follow up 1 week maybe sutures out then  Work note written. Off x 3 weeks more, at least.   Knee immobilizer until wound healed, then maybe Tscope brace to prevent too much flexion.   Consider physical therapy   She asked about an MRI, but I think with an intact SLR, the scan would not be necessary.  Wound re-dressed, knee immobilizer applied.   Change dressing daily.    ROMANA Lindo MD  Dept. Orthopedic Surgery  [facility]       Again, thank you for allowing me to participate in the care of your patient.        Sincerely,        Kevin Lindo MD    Electronically signed

## 2025-01-23 NOTE — LETTER
January 23, 2025      Mami Epps  [address]  [place] [address]        To Whom It May Concern:    Mami Epps  was seen on 1.23.25.  She should be excused from work for the next 3 weeks due to a Left knee injury.          Sincerely,        Kevin Lindo MD    Electronically signed

## 2025-01-24 ENCOUNTER — HOSPITAL ENCOUNTER (INPATIENT)
Facility: CLINIC | Age: 50
LOS: 1 days | Discharge: HOME | DRG: 603 | End: 2025-01-26
Attending: EMERGENCY MEDICINE | Admitting: INTERNAL MEDICINE
Payer: COMMERCIAL

## 2025-01-24 DIAGNOSIS — S86.922D LACERATION OF LEFT KNEE WITH TENDON INVOLVEMENT, SUBSEQUENT ENCOUNTER: ICD-10-CM

## 2025-01-24 DIAGNOSIS — L03.116 CELLULITIS OF LEFT KNEE: ICD-10-CM

## 2025-01-24 DIAGNOSIS — S81.012D LACERATION OF LEFT KNEE WITH TENDON INVOLVEMENT, SUBSEQUENT ENCOUNTER: ICD-10-CM

## 2025-01-24 DIAGNOSIS — Z79.2 LONG TERM (CURRENT) USE OF ANTIBIOTICS: Primary | ICD-10-CM

## 2025-01-24 LAB
ANION GAP SERPL CALCULATED.3IONS-SCNC: 12 MMOL/L (ref 7–15)
BASOPHILS # BLD AUTO: 0.1 10E3/UL (ref 0–0.2)
BASOPHILS NFR BLD AUTO: 1 %
BUN SERPL-MCNC: 23.4 MG/DL (ref 6–20)
CALCIUM SERPL-MCNC: 9.9 MG/DL (ref 8.8–10.4)
CHLORIDE SERPL-SCNC: 103 MMOL/L (ref 98–107)
CREAT SERPL-MCNC: 0.83 MG/DL (ref 0.51–0.95)
EGFRCR SERPLBLD CKD-EPI 2021: 85 ML/MIN/1.73M2
EOSINOPHIL # BLD AUTO: 0.5 10E3/UL (ref 0–0.7)
EOSINOPHIL NFR BLD AUTO: 6 %
ERYTHROCYTE [DISTWIDTH] IN BLOOD BY AUTOMATED COUNT: 12.9 % (ref 10–15)
GLUCOSE SERPL-MCNC: 106 MG/DL (ref 70–99)
HCO3 SERPL-SCNC: 22 MMOL/L (ref 22–29)
HCT VFR BLD AUTO: 43 % (ref 35–47)
HGB BLD-MCNC: 14.7 G/DL (ref 11.7–15.7)
IMM GRANULOCYTES # BLD: 0 10E3/UL
IMM GRANULOCYTES NFR BLD: 0 %
LYMPHOCYTES # BLD AUTO: 2.5 10E3/UL (ref 0.8–5.3)
LYMPHOCYTES NFR BLD AUTO: 26 %
MCH RBC QN AUTO: 33.3 PG (ref 26.5–33)
MCHC RBC AUTO-ENTMCNC: 34.2 G/DL (ref 31.5–36.5)
MCV RBC AUTO: 97 FL (ref 78–100)
MONOCYTES # BLD AUTO: 0.9 10E3/UL (ref 0–1.3)
MONOCYTES NFR BLD AUTO: 10 %
NEUTROPHILS # BLD AUTO: 5.5 10E3/UL (ref 1.6–8.3)
NEUTROPHILS NFR BLD AUTO: 58 %
NRBC # BLD AUTO: 0 10E3/UL
NRBC BLD AUTO-RTO: 0 /100
PLATELET # BLD AUTO: 329 10E3/UL (ref 150–450)
POTASSIUM SERPL-SCNC: 4.5 MMOL/L (ref 3.4–5.3)
PROCALCITONIN SERPL IA-MCNC: 0.06 NG/ML
RBC # BLD AUTO: 4.42 10E6/UL (ref 3.8–5.2)
SODIUM SERPL-SCNC: 137 MMOL/L (ref 135–145)
WBC # BLD AUTO: 9.5 10E3/UL (ref 4–11)

## 2025-01-24 PROCEDURE — 85048 AUTOMATED LEUKOCYTE COUNT: CPT | Performed by: EMERGENCY MEDICINE

## 2025-01-24 PROCEDURE — 250N000011 HC RX IP 250 OP 636: Performed by: INTERNAL MEDICINE

## 2025-01-24 PROCEDURE — G0378 HOSPITAL OBSERVATION PER HR: HCPCS

## 2025-01-24 PROCEDURE — 85004 AUTOMATED DIFF WBC COUNT: CPT | Performed by: EMERGENCY MEDICINE

## 2025-01-24 PROCEDURE — 80048 BASIC METABOLIC PNL TOTAL CA: CPT | Performed by: EMERGENCY MEDICINE

## 2025-01-24 PROCEDURE — 250N000011 HC RX IP 250 OP 636: Performed by: EMERGENCY MEDICINE

## 2025-01-24 PROCEDURE — 84145 PROCALCITONIN (PCT): CPT | Performed by: INTERNAL MEDICINE

## 2025-01-24 PROCEDURE — 99285 EMERGENCY DEPT VISIT HI MDM: CPT | Performed by: EMERGENCY MEDICINE

## 2025-01-24 PROCEDURE — 99223 1ST HOSP IP/OBS HIGH 75: CPT | Performed by: INTERNAL MEDICINE

## 2025-01-24 PROCEDURE — 99285 EMERGENCY DEPT VISIT HI MDM: CPT | Mod: 25 | Performed by: EMERGENCY MEDICINE

## 2025-01-24 PROCEDURE — 82374 ASSAY BLOOD CARBON DIOXIDE: CPT | Performed by: EMERGENCY MEDICINE

## 2025-01-24 PROCEDURE — 96372 THER/PROPH/DIAG INJ SC/IM: CPT | Performed by: INTERNAL MEDICINE

## 2025-01-24 PROCEDURE — 36415 COLL VENOUS BLD VENIPUNCTURE: CPT | Performed by: EMERGENCY MEDICINE

## 2025-01-24 PROCEDURE — 96374 THER/PROPH/DIAG INJ IV PUSH: CPT | Performed by: EMERGENCY MEDICINE

## 2025-01-24 PROCEDURE — 250N000013 HC RX MED GY IP 250 OP 250 PS 637: Performed by: EMERGENCY MEDICINE

## 2025-01-24 RX ORDER — ALBUTEROL SULFATE 90 UG/1
2 INHALANT RESPIRATORY (INHALATION) EVERY 6 HOURS PRN
Status: DISCONTINUED | OUTPATIENT
Start: 2025-01-24 | End: 2025-01-26 | Stop reason: HOSPADM

## 2025-01-24 RX ORDER — ENOXAPARIN SODIUM 100 MG/ML
40 INJECTION SUBCUTANEOUS EVERY 24 HOURS
Status: DISCONTINUED | OUTPATIENT
Start: 2025-01-24 | End: 2025-01-26 | Stop reason: HOSPADM

## 2025-01-24 RX ORDER — VILAZODONE HYDROCHLORIDE 40 MG/1
40 TABLET ORAL DAILY
Status: DISCONTINUED | OUTPATIENT
Start: 2025-01-25 | End: 2025-01-26 | Stop reason: HOSPADM

## 2025-01-24 RX ORDER — ACETAMINOPHEN 650 MG/1
650 SUPPOSITORY RECTAL EVERY 4 HOURS PRN
Status: DISCONTINUED | OUTPATIENT
Start: 2025-01-24 | End: 2025-01-26 | Stop reason: HOSPADM

## 2025-01-24 RX ORDER — FLUTICASONE PROPIONATE 50 MCG
2 SPRAY, SUSPENSION (ML) NASAL DAILY
Status: DISCONTINUED | OUTPATIENT
Start: 2025-01-25 | End: 2025-01-26 | Stop reason: HOSPADM

## 2025-01-24 RX ORDER — LIDOCAINE 40 MG/G
CREAM TOPICAL
Status: DISCONTINUED | OUTPATIENT
Start: 2025-01-24 | End: 2025-01-26 | Stop reason: HOSPADM

## 2025-01-24 RX ORDER — AMOXICILLIN 250 MG
1 CAPSULE ORAL 2 TIMES DAILY PRN
Status: DISCONTINUED | OUTPATIENT
Start: 2025-01-24 | End: 2025-01-26 | Stop reason: HOSPADM

## 2025-01-24 RX ORDER — ACETAMINOPHEN 325 MG/1
650 TABLET ORAL EVERY 4 HOURS PRN
Status: DISCONTINUED | OUTPATIENT
Start: 2025-01-24 | End: 2025-01-26 | Stop reason: HOSPADM

## 2025-01-24 RX ORDER — VILAZODONE HYDROCHLORIDE 40 MG/1
40 TABLET ORAL DAILY
COMMUNITY

## 2025-01-24 RX ORDER — CEFAZOLIN SODIUM 1 G/50ML
1250 SOLUTION INTRAVENOUS EVERY 12 HOURS
Status: DISCONTINUED | OUTPATIENT
Start: 2025-01-24 | End: 2025-01-24

## 2025-01-24 RX ORDER — CALCIUM CARBONATE 500 MG/1
1000 TABLET, CHEWABLE ORAL 4 TIMES DAILY PRN
Status: DISCONTINUED | OUTPATIENT
Start: 2025-01-24 | End: 2025-01-26 | Stop reason: HOSPADM

## 2025-01-24 RX ORDER — AMOXICILLIN 250 MG
2 CAPSULE ORAL 2 TIMES DAILY PRN
Status: DISCONTINUED | OUTPATIENT
Start: 2025-01-24 | End: 2025-01-26 | Stop reason: HOSPADM

## 2025-01-24 RX ORDER — ACETAMINOPHEN 325 MG/1
650 TABLET ORAL EVERY 4 HOURS PRN
Status: DISCONTINUED | OUTPATIENT
Start: 2025-01-24 | End: 2025-01-24

## 2025-01-24 RX ADMIN — ACETAMINOPHEN 650 MG: 325 TABLET, FILM COATED ORAL at 21:49

## 2025-01-24 RX ADMIN — Medication 1250 MG: at 21:44

## 2025-01-24 RX ADMIN — ENOXAPARIN SODIUM 40 MG: 40 INJECTION SUBCUTANEOUS at 23:52

## 2025-01-24 ASSESSMENT — ACTIVITIES OF DAILY LIVING (ADL)
ADLS_ACUITY_SCORE: 41

## 2025-01-24 ASSESSMENT — COLUMBIA-SUICIDE SEVERITY RATING SCALE - C-SSRS
2. HAVE YOU ACTUALLY HAD ANY THOUGHTS OF KILLING YOURSELF IN THE PAST MONTH?: NO
6. HAVE YOU EVER DONE ANYTHING, STARTED TO DO ANYTHING, OR PREPARED TO DO ANYTHING TO END YOUR LIFE?: NO
1. IN THE PAST MONTH, HAVE YOU WISHED YOU WERE DEAD OR WISHED YOU COULD GO TO SLEEP AND NOT WAKE UP?: NO

## 2025-01-25 PROCEDURE — 250N000013 HC RX MED GY IP 250 OP 250 PS 637: Performed by: INTERNAL MEDICINE

## 2025-01-25 PROCEDURE — 250N000011 HC RX IP 250 OP 636: Performed by: INTERNAL MEDICINE

## 2025-01-25 PROCEDURE — G0378 HOSPITAL OBSERVATION PER HR: HCPCS

## 2025-01-25 PROCEDURE — 120N000002 HC R&B MED SURG/OB UMMC

## 2025-01-25 PROCEDURE — 99233 SBSQ HOSP IP/OBS HIGH 50: CPT | Performed by: INTERNAL MEDICINE

## 2025-01-25 PROCEDURE — 96376 TX/PRO/DX INJ SAME DRUG ADON: CPT

## 2025-01-25 RX ORDER — MULTIPLE VITAMINS W/ MINERALS TAB 9MG-400MCG
1 TAB ORAL DAILY
COMMUNITY

## 2025-01-25 RX ORDER — ACETAMINOPHEN 325 MG/1
650 TABLET ORAL EVERY 6 HOURS PRN
Status: ON HOLD | COMMUNITY
End: 2025-01-26

## 2025-01-25 RX ADMIN — ACETAMINOPHEN 650 MG: 325 TABLET, FILM COATED ORAL at 02:56

## 2025-01-25 RX ADMIN — Medication 1250 MG: at 21:38

## 2025-01-25 RX ADMIN — ENOXAPARIN SODIUM 40 MG: 40 INJECTION SUBCUTANEOUS at 23:24

## 2025-01-25 RX ADMIN — ACETAMINOPHEN 650 MG: 325 TABLET, FILM COATED ORAL at 08:23

## 2025-01-25 RX ADMIN — ACETAMINOPHEN 650 MG: 325 TABLET, FILM COATED ORAL at 19:18

## 2025-01-25 RX ADMIN — Medication 1250 MG: at 10:09

## 2025-01-25 RX ADMIN — FLUTICASONE PROPIONATE 2 SPRAY: 50 SPRAY, METERED NASAL at 09:18

## 2025-01-25 RX ADMIN — ACETAMINOPHEN 650 MG: 325 TABLET, FILM COATED ORAL at 14:16

## 2025-01-25 RX ADMIN — ACETAMINOPHEN 650 MG: 325 TABLET, FILM COATED ORAL at 23:31

## 2025-01-25 RX ADMIN — VILAZODONE HYDROCHLORIDE 40 MG: 40 TABLET ORAL at 09:19

## 2025-01-25 ASSESSMENT — ACTIVITIES OF DAILY LIVING (ADL)
ADLS_ACUITY_SCORE: 50
ADLS_ACUITY_SCORE: 50
ADLS_ACUITY_SCORE: 33
ADLS_ACUITY_SCORE: 50
ADLS_ACUITY_SCORE: 41
ADLS_ACUITY_SCORE: 50
ADLS_ACUITY_SCORE: 33
ADLS_ACUITY_SCORE: 33
ADLS_ACUITY_SCORE: 50
ADLS_ACUITY_SCORE: 33
ADLS_ACUITY_SCORE: 33
ADLS_ACUITY_SCORE: 50
ADLS_ACUITY_SCORE: 33
ADLS_ACUITY_SCORE: 50
ADLS_ACUITY_SCORE: 33
ADLS_ACUITY_SCORE: 33
ADLS_ACUITY_SCORE: 56
ADLS_ACUITY_SCORE: 33
ADLS_ACUITY_SCORE: 33

## 2025-01-25 NOTE — PLAN OF CARE
Goal Outcome Evaluation:      Plan of Care Reviewed With: patient    Overall Patient Progress: no changeOverall Patient Progress: no change    Pt arrived from ED @ 2300 A&Ox4 BP was 141/74, recheck @ 0039 and it came down to 133/48. Pt reported minimal pain in the L knee. Continue POC.     Admission Note    Reason for admission: L knee inefction  Primary team notified of pt arrival.  Admitted from: ED  Via: Wheelchair.  Accompanied by: ED transporter  Belongings: Placed in closet.  Admission Required Doc Completed: No  Teaching: Orientation to unit and call light- call light within reach, use of console, meal times, when to call for the RN, and enforced importance of safety.  IV Access: L PIV  Telemetry: No  Ht./Wt.: Completed  Code Status verified on armband: Yes.  2 RN Skin Assessment Completed with: Nataliia Huertas/Ambu bag/Flowmeter at bedside: Yes      VS: Temp: 98.2  F (36.8  C) Temp src: Oral BP: 133/48 Pulse: 73   Resp: 16 SpO2: 98 % O2 Device: None (Room air)      O2: SpO2 > 95% and stable on RA. LS clear and equal. Denies chest pain and SOB.    Output: Voids spontaneously without difficulty to bathroom.   Last BM: 1/24/2025, denies abdominal discomfort. passing flatus.    Activity: Up ad cassia   Skin: WDL except, incision to the L knee, small bruise to the L buttock & shin.   Pain: Pain managed with PRN Tylenol.   CMS: Intact, AOx4. Denies numbness and tingling.   Dressing: CDI, Immobilizer in place.   Diet: Regular diet. Denies nausea/vomiting.     LDA: L PIV SL between ABX.   Equipment: IV pole, personal belongings,    Plan: Continue with plan of care and ABX. Call light within reach, pt able to make needs known.

## 2025-01-25 NOTE — ED PROVIDER NOTES
ED Provider Note  [facility]      History     Chief Complaint   Patient presents with    Knee Pain     Left knee pain and redness spread around the sutures.     HPI  Mami Epps is a 49 year old female who presents to the emergency department for knee pain. She had sustained a laceration to the knee while traveling in [place].  She states that she had tripped and fallen onto her left knee and sustained a laceration from the [place].  She had been seen in the ED there is laceration was deep and was repaired and she was put on antibiotics.  She was taking xenobiotics but then noted some redness and was seen in urgent care on 1/22 where she was switched to Bactrim which she has been taking.  She said that initially it helped but then over the last day she has had increased redness and warmth at that site.    Past Medical History  Past Medical History:   Diagnosis Date    Generalized anxiety disorder     History of basal cell carcinoma     Major depression     STEVE (obstructive sleep apnea)      Past Surgical History:   Procedure Laterality Date    DISCECTOMY LUMBAR ANTERIOR      HYSTERECTOMY, PAP NO LONGER INDICATED       No current outpatient medications on file.    Allergies   Allergen Reactions    Contrast Dye Anaphylaxis    Dust Mites Shortness Of Breath     Runny nose, shortness of breath, wheezing     Family History  Family History   Problem Relation Age of Onset    Colon Cancer Mother     Diabetes Father      Social History   Social History     Tobacco Use    Smoking status: Never     Passive exposure: Never    Smokeless tobacco: Never   Vaping Use    Vaping status: Never Used   Substance Use Topics    Alcohol use: Not Currently    Drug use: Never      A medically appropriate review of systems was performed with pertinent positives and negatives noted in the HPI, and all other systems negative.    Physical Exam   BP: (!) 145/67  Pulse: 72  Temp: 97.9  F (36.6  C)  Resp:  16  Weight: 99.8 kg (220 lb)  SpO2: 98 %  Physical Exam  Constitutional:       General: She is not in acute distress.     Appearance: She is not toxic-appearing.   HENT:      Head: Normocephalic and atraumatic.      Mouth/Throat:      Mouth: Mucous membranes are moist.      Pharynx: Oropharynx is clear.   Eyes:      Conjunctiva/sclera: Conjunctivae normal.      Pupils: Pupils are equal, round, and reactive to light.   Cardiovascular:      Rate and Rhythm: Normal rate.   Pulmonary:      Effort: Pulmonary effort is normal. No respiratory distress.      Breath sounds: No wheezing.   Skin:     General: Skin is warm and dry.      Comments: Laceration to the left knee repaired with sutures. There is erythema and warmth around the site to the anterior knee. Some tenderness at the site of the laceration but no other tenderness   Neurological:      General: No focal deficit present.      Mental Status: She is alert and oriented to person, place, and time.           ED Course, Procedures, & Data      Procedures           IV Antibiotics given and/or elevated Lactate of 0 and no sepsis note found - Delete this reminder and enter the sepsis note or '.edcms' before signing chart.>>>     Results for orders placed or performed during the hospital encounter of 01/24/25   Basic metabolic panel     Status: Abnormal   Result Value Ref Range    Sodium 137 135 - 145 mmol/L    Potassium 4.5 3.4 - 5.3 mmol/L    Chloride 103 98 - 107 mmol/L    Carbon Dioxide (CO2) 22 22 - 29 mmol/L    Anion Gap 12 7 - 15 mmol/L    Urea Nitrogen 23.4 (H) 6.0 - 20.0 mg/dL    Creatinine 0.83 0.51 - 0.95 mg/dL    GFR Estimate 85 >60 mL/min/1.73m2    Calcium 9.9 8.8 - 10.4 mg/dL    Glucose 106 (H) 70 - 99 mg/dL   CBC with platelets and differential     Status: Abnormal   Result Value Ref Range    WBC Count 9.5 4.0 - 11.0 10e3/uL    RBC Count 4.42 3.80 - 5.20 10e6/uL    Hemoglobin 14.7 11.7 - 15.7 g/dL    Hematocrit 43.0 35.0 - 47.0 %    MCV 97 78 - 100 fL    MCH  33.3 (H) 26.5 - 33.0 pg    MCHC 34.2 31.5 - 36.5 g/dL    RDW 12.9 10.0 - 15.0 %    Platelet Count 329 150 - 450 10e3/uL    % Neutrophils 58 %    % Lymphocytes 26 %    % Monocytes 10 %    % Eosinophils 6 %    % Basophils 1 %    % Immature Granulocytes 0 %    NRBCs per 100 WBC 0 <1 /100    Absolute Neutrophils 5.5 1.6 - 8.3 10e3/uL    Absolute Lymphocytes 2.5 0.8 - 5.3 10e3/uL    Absolute Monocytes 0.9 0.0 - 1.3 10e3/uL    Absolute Eosinophils 0.5 0.0 - 0.7 10e3/uL    Absolute Basophils 0.1 0.0 - 0.2 10e3/uL    Absolute Immature Granulocytes 0.0 <=0.4 10e3/uL    Absolute NRBCs 0.0 10e3/uL   Procalcitonin     Status: Normal   Result Value Ref Range    Procalcitonin 0.06 <0.50 ng/mL   CBC with platelets differential     Status: Abnormal    Narrative    The following orders were created for panel order CBC with platelets differential.  Procedure                               Abnormality         Status                     ---------                               -----------         ------                     CBC with platelets and d...[[id number]]  Abnormal            Final result                 Please view results for these tests on the individual orders.     Medications   vancomycin (VANCOCIN) 1,250 mg in 0.9% NaCl 250 mL intermittent infusion (1,250 mg Intravenous $New Bag 1/24/25 3783)   lidocaine 1 % 0.1-1 mL (has no administration in time range)   lidocaine (LMX4) cream (has no administration in time range)   sodium chloride (PF) 0.9% PF flush 3 mL (has no administration in time range)   sodium chloride (PF) 0.9% PF flush 3 mL (has no administration in time range)   senna-docusate (SENOKOT-S/PERICOLACE) 8.6-50 MG per tablet 1 tablet (has no administration in time range)     Or   senna-docusate (SENOKOT-S/PERICOLACE) 8.6-50 MG per tablet 2 tablet (has no administration in time range)   calcium carbonate (TUMS) chewable tablet 1,000 mg (has no administration in time range)   enoxaparin ANTICOAGULANT (LOVENOX) injection  40 mg (has no administration in time range)   acetaminophen (TYLENOL) tablet 650 mg (has no administration in time range)     Or   acetaminophen (TYLENOL) Suppository 650 mg (has no administration in time range)   albuterol (PROVENTIL HFA/VENTOLIN HFA) inhaler (has no administration in time range)   fluticasone (FLONASE) 50 MCG/ACT spray 2 spray (has no administration in time range)   vilazodone (VIIBRYD) tablet 40 mg (has no administration in time range)     Labs Ordered and Resulted from Time of ED Arrival to Time of ED Departure   BASIC METABOLIC PANEL - Abnormal       Result Value    Sodium 137      Potassium 4.5      Chloride 103      Carbon Dioxide (CO2) 22      Anion Gap 12      Urea Nitrogen 23.4 (*)     Creatinine 0.83      GFR Estimate 85      Calcium 9.9      Glucose 106 (*)    CBC WITH PLATELETS AND DIFFERENTIAL - Abnormal    WBC Count 9.5      RBC Count 4.42      Hemoglobin 14.7      Hematocrit 43.0      MCV 97      MCH 33.3 (*)     MCHC 34.2      RDW 12.9      Platelet Count 329      % Neutrophils 58      % Lymphocytes 26      % Monocytes 10      % Eosinophils 6      % Basophils 1      % Immature Granulocytes 0      NRBCs per 100 WBC 0      Absolute Neutrophils 5.5      Absolute Lymphocytes 2.5      Absolute Monocytes 0.9      Absolute Eosinophils 0.5      Absolute Basophils 0.1      Absolute Immature Granulocytes 0.0      Absolute NRBCs 0.0     PROCALCITONIN - Normal    Procalcitonin 0.06       No orders to display          Critical care was not performed.     Medical Decision Making  The patient's presentation was of high complexity (an acute health issue posing potential threat to life or bodily function).    The patient's evaluation involved:  review of external note(s) from 3+ sources (ED note, urgent care note, orthopedic note)  review of 3+ test result(s) ordered prior to this encounter (cbc, bmp, xray)  ordering and/or review of 2 test(s) in this encounter (see separate area of note for  details)  discussion of management or test interpretation with another health professional (see separate area of note for details)    The patient's management necessitated high risk (a decision regarding hospitalization).    Assessment & Plan    This is a 49-year-old female presenting with concern for a wound infection.  She had sustained a laceration and had previously been Keflex and then Bactrim for this however has had increased redness and pain.  She was afebrile and vitals were reassuring.  On exam she did have some erythema surrounding the site of laceration but no induration or fluctuance.  She has been on 2 different courses of a biotics and given worsening symptoms that are consistent with cellulitis she has failed outpatient management.  I started her on IV vancomycin and recommended hospitalization which she was agreeable to.  The patient discussed with hospitalist.    I have reviewed the nursing notes. I have reviewed the findings, diagnosis, plan and need for follow up with the patient.    Current Discharge Medication List          Final diagnoses:   Cellulitis of left knee       Garth Cody MD  [facility] EMERGENCY DEPARTMENT  1/24/2025     Garth Cody MD  01/24/25 2588

## 2025-01-25 NOTE — PROGRESS NOTES
"  VS: BP (!) 145/77 (BP Location: Right arm)   Pulse 72   Temp 98.5  F (36.9  C) (Oral)   Resp 16   Wt 99.8 kg (220 lb)   SpO2 100%   BMI 35.78 kg/m      O2: Room air saturations 100%.    Output: Denies issues with urine output   Last BM: No issues with constipation   Activity: Pt likes to have knee immobilizer on left knee. Pt is very aware of how to take her knee immobilizer on and off.    Skin: Pt states\" My left knee is improving since starting the IV antibiotics\"    Pain: Pt is using Tylenol for pain.    CMS: Intact   Dressing: Left knee incision was changed per pt this am. See photo in media tab.    Diet: Regular. Tolerating well.    LDA: IV SL for antibiotics   Equipment: Knee immobilizer, dressing supplies.    Plan: Pt is hoping to discharge to home when medically stable.    Additional Info: [place] [employer]card[facility] unit. Pt is delightful in conversation. Status of patient will continue to be monitored.      "

## 2025-01-25 NOTE — H&P
[facility] History and Physical  Department of Internal Medicine    Patient Name: Mami Epps MRN# [id number]   Age: 49 year old YOB: 1975     Date of Admission:1/24/2025      Primary care provider: Lorraine Srinivasan  Date of Service: 1/24/2025  Admitting Team: [facility]         Assessment and Plan:   Mami Epps is a 49 year old female with past history of obesity, depressive disorders, obstructive sleep apnea, history of basal cell carcinoma, generalized anxiety disorder who presents with redness around the left knee laceration area.  Failed outpatient antibiotic therapy      Cellulitis and infected left knee area laceration, sepsis not present at admission.  Failed outpatient antibiotic therapy.  Patient started on vancomycin 1250 mg IV twice daily.  Will add a procalcitonin from blood draw earlier.  Failed outpatient antibiotic therapy  Left knee area laceration status post stitching  Obesity  Obstructive sleep apnea  History of depressive disorders,  generalized anxiety disorder.  Continue PTA Vilazodone     CODE: Full code  Diet/IVF: Regular diet,  DVT ppx: Lovenox 40 mg subcu  Disposition/Admission Status: Inpatient    Pooja Lai MD  Internal Medicine Staff Hospitalist  [facility]  Pager: [phone]       Chief Complaint:   Redness around left knee laceration area         HPI:   This is a very pleasant 49-year-old female patient who presents to the ED for evaluation of redness around the laceration wound which was stitched 10 days ago on July 14 when she fell and lacerated the skin in front of her left knee.  At that time stitches were applied patient was prescribed Keflex 500 mg 3 times daily for 7 days which she completed.  The healing was not great so on Wednesday on January 22 she was prescribed Bactrim double strength twice daily and also received a dose of Rocephin IM.  Today she changed the dressing of the wound and noted that there was an  increasing redness.  In the ED patient is afebrile.  She also denies any fever but felt warm at home.  No chills.  No cough, shortness of breath wheezes nausea vomiting or abdominal pain.  No urinary complaints.  She does not have leukocytosis on CBC.  Normal BMP.  Started on vancomycin in the ED with an impression of cellulitis and infected wound and failed outpatient antibiotic therapy.         Past Medical History:     Past Medical History:   Diagnosis Date    Generalized anxiety disorder     History of basal cell carcinoma     Major depression     STEVE (obstructive sleep apnea)      Reviewed          Past Surgical History:     Past Surgical History:   Procedure Laterality Date    DISCECTOMY LUMBAR ANTERIOR      HYSTERECTOMY, PAP NO LONGER INDICATED                Social History:   Reviewed   Social History     Socioeconomic History    Marital status: Single     Spouse name: Not on file    Number of children: Not on file    Years of education: Not on file    Highest education level: Not on file   Occupational History    Not on file   Tobacco Use    Smoking status: Never     Passive exposure: Never    Smokeless tobacco: Never   Vaping Use    Vaping status: Never Used   Substance and Sexual Activity    Alcohol use: Not Currently    Drug use: Never    Sexual activity: Not Currently   Other Topics Concern    Not on file   Social History Narrative    Not on file     Social Drivers of Health     Financial Resource Strain: Low Risk  (5/17/2024)    Financial Resource Strain     Within the past 12 months, have you or your family members you live with been unable to get utilities (heat, electricity) when it was really needed?: No   Food Insecurity: Low Risk  (5/17/2024)    Food Insecurity     Within the past 12 months, did you worry that your food would run out before you got money to buy more?: No     Within the past 12 months, did the food you bought just not last and you didn t have money to get more?: No    Transportation Needs: Low Risk  (5/17/2024)    Transportation Needs     Within the past 12 months, has lack of transportation kept you from medical appointments, getting your medicines, non-medical meetings or appointments, work, or from getting things that you need?: No   Physical Activity: Unknown (5/17/2024)    Exercise Vital Sign     Days of Exercise per Week: 2 days     Minutes of Exercise per Session: Not on file   Stress: Stress Concern Present (5/17/2024)    [employer] - Occupational Stress Questionnaire     Feeling of Stress : To some extent   Social Connections: Unknown (5/17/2024)    Social Connection and Isolation Panel [NHANES]     Frequency of Communication with Friends and Family: Not on file     Frequency of Social Gatherings with Friends and Family: Twice a week     Attends Muslim Services: Not on file     Active Member of Clubs or Organizations: Not on file     Attends Club or Organization Meetings: Not on file     Marital Status: Not on file   Interpersonal Safety: Low Risk  (11/26/2024)    Interpersonal Safety     Do you feel physically and emotionally safe where you currently live?: Yes     Within the past 12 months, have you been hit, slapped, kicked or otherwise physically hurt by someone?: No     Within the past 12 months, have you been humiliated or emotionally abused in other ways by your partner or ex-partner?: No   Housing Stability: Low Risk  (5/17/2024)    Housing Stability     Do you have housing? : Yes     Are you worried about losing your housing?: No            Family History:     Family History   Problem Relation Age of Onset    Colon Cancer Mother     Diabetes Father      Reviewed         Immunizations:     Immunization History   Administered Date(s) Administered    COVID-19 12+ (MODERNA) 09/19/2023    Hepatitis B, Adult 01/13/1993, 02/17/1993, 08/26/1993, 09/16/1998, 01/22/2024    Influenza Vaccine >6 months,quad, PF 09/19/2023    Influenza  Vaccine Trivalent (FluBlok) 11/06/2024    TDAP (Adacel,Boostrix) 06/19/2024    Zoster recombinant adjuvanted (SHINGRIX) 05/17/2024              Allergies:      Allergies   Allergen Reactions    Contrast Dye Anaphylaxis    Dust Mites Shortness Of Breath     Runny nose, shortness of breath, wheezing            Medications:     Current Outpatient Medications   Medication Instructions    acetaminophen (TYLENOL 8 HOUR) 650 mg, EVERY 8 HOURS PRN    albuterol (PROAIR HFA/PROVENTIL HFA/VENTOLIN HFA) 108 (90 Base) MCG/ACT inhaler 2 puffs, Inhalation, EVERY 6 HOURS PRN    fluticasone (FLONASE) 50 MCG/ACT nasal spray 2 sprays, DAILY    sulfamethoxazole-trimethoprim (BACTRIM DS) 800-160 MG tablet 1 tablet, Oral, 2 TIMES DAILY    vilazodone (VIIBRYD) 40 mg, DAILY       Current Facility-Administered Medications:     acetaminophen (TYLENOL) tablet 650 mg, 650 mg, Oral, Q4H PRN **OR** acetaminophen (TYLENOL) Suppository 650 mg, 650 mg, Rectal, Q4H PRN, Pooja Lai MD    acetaminophen (TYLENOL) tablet 650 mg, 650 mg, Oral, Q4H PRN, Pooja Lai MD, 650 mg at 01/24/25 2149    albuterol (PROVENTIL HFA/VENTOLIN HFA) inhaler, 2 puff, Inhalation, Q6H PRN, Pooja Lai MD    calcium carbonate (TUMS) chewable tablet 1,000 mg, 1,000 mg, Oral, 4x Daily PRN, Pooja Lai MD    enoxaparin ANTICOAGULANT (LOVENOX) injection 40 mg, 40 mg, Subcutaneous, Q24H, Pooja Lai MD    [START ON 1/25/2025] fluticasone (FLONASE) 50 MCG/ACT spray 2 spray, 2 spray, Both Nostrils, Daily, Pooja Lai MD    lidocaine (LMX4) cream, , Topical, Q1H PRN, Pooja Lai MD    lidocaine 1 % 0.1-1 mL, 0.1-1 mL, Other, Q1H PRN, Pooja Lai MD    senna-docusate (SENOKOT-S/PERICOLACE) 8.6-50 MG per tablet 1 tablet, 1 tablet, Oral, BID PRN **OR** senna-docusate (SENOKOT-S/PERICOLACE) 8.6-50 MG per tablet 2 tablet, 2 tablet, Oral, BID PRN, Pooja Lai MD    sodium chloride (PF) 0.9% PF flush 3 mL, 3 mL, Intracatheter, Q8H,  Pooja Lai MD    sodium chloride (PF) 0.9% PF flush 3 mL, 3 mL, Intracatheter, q1 min prn, Pooja Lai MD    vancomycin (VANCOCIN) 1,250 mg in 0.9% NaCl 250 mL intermittent infusion, 1,250 mg, Intravenous, Q12H, Pooja Lai MD, 1,250 mg at 01/24/25 2144    [START ON 1/25/2025] vilazodone (VIIBRYD) tablet 40 mg, 40 mg, Oral, Daily, Pooja Lai MD    Current Outpatient Medications:     albuterol (PROAIR HFA/PROVENTIL HFA/VENTOLIN HFA) 108 (90 Base) MCG/ACT inhaler, Inhale 2 puffs into the lungs every 6 hours as needed for shortness of breath, wheezing or cough., Disp: 18 g, Rfl: 3    fluticasone (FLONASE) 50 MCG/ACT nasal spray, Spray 2 sprays into both nostrils daily, Disp: , Rfl:     sulfamethoxazole-trimethoprim (BACTRIM DS) 800-160 MG tablet, Take 1 tablet by mouth 2 times daily for 7 days., Disp: 14 tablet, Rfl: 0    vilazodone (VIIBRYD) 40 MG TABS tablet, Take 40 mg by mouth daily., Disp: , Rfl:     acetaminophen (TYLENOL 8 HOUR) 650 MG CR tablet, Take 650 mg by mouth every 8 hours as needed for mild pain or fever., Disp: , Rfl:            Review of Systems:   A complete ROS was performed and is negative other than what is stated in the HPI.          Physical Exam:   BP (!) 145/67   Pulse 72   Temp 97.9  F (36.6  C) (Oral)   Resp 16   Wt 99.8 kg (220 lb)   SpO2 98%   BMI 35.78 kg/m       GENERAL: Alert and oriented x 3. NAD.   HEENT: Anicteric sclera. Mucous membranes moist.   CV: RRR. S1, S2. No murmurs appreciated.   RESPIRATORY: Effort normal on RA. Lungs CTAB with no wheezing, rales, rhonchi.   GI: Abdomen soft and non distended with normoactive bowel sounds present in all quadrants. No tenderness, rebound, guarding.   NEUROLOGICAL: No focal deficits. Moves all extremities.    EXTREMITIES: No peripheral edema. Intact bilateral pedal pulses.   SKIN: Redness around several centimeter long transverse laceration of about 7 cm which is stitched.  No purulent discharge noted but the  area around the wound looks angry.       Data:   CBC RESULTS:   Recent Labs   Lab Test 01/24/25 2059   WBC 9.5   RBC 4.42   HGB 14.7   HCT 43.0   MCV 97   MCH 33.3*   MCHC 34.2   RDW 12.9        Last Comprehensive Metabolic Panel:  Lab Results   Component Value Date     01/24/2025    POTASSIUM 4.5 01/24/2025    CHLORIDE 103 01/24/2025    CO2 22 01/24/2025    ANIONGAP 12 01/24/2025     (H) 01/24/2025    BUN 23.4 (H) 01/24/2025    CR 0.83 01/24/2025    GFRESTIMATED 85 01/24/2025    MORENO 9.9 01/24/2025       Liver Function Studies -   Recent Labs   Lab Test 12/23/24  1807   PROTTOTAL 6.9   ALBUMIN 4.3   BILITOTAL 0.3   ALKPHOS 56   AST 23   ALT 22     No orders to display

## 2025-01-25 NOTE — PROGRESS NOTES
[facility]    Medicine Progress Note - Hospitalist Service, GOLD TEAM 18    Date of Admission:  1/24/2025    Assessment & Plan   Mami Epps is a 49 year old female with past history of obesity, depressive disorders, obstructive sleep apnea, history of basal cell carcinoma, generalized anxiety disorder who presents with redness around the left knee laceration area.  Failed outpatient antibiotic therapy      # Cellulitis and infected left knee area laceration, sepsis not present at admission.  Failed outpatient antibiotic therapy.  Patient started on vancomycin 1250 mg IV twice daily.  Will add a procalcitonin from blood draw earlier.  - Can likely transition to oral doxycycline upon discharge tomorrow.    # Failed outpatient antibiotic therapy  # Left knee area laceration status post stitching  # Obesity  # Obstructive sleep apnea  # Hx depressive disorders,  generalized anxiety disorder.  - Continue PTA Vilazodone           Diet: Combination Diet Regular Diet Adult    DVT Prophylaxis: Enoxaparin (Lovenox) SQ  Uriostegui Catheter: Not present  Lines: None     Cardiac Monitoring: None  Code Status: Full Code      Clinically Significant Risk Factors Present on Admission                                 # Asthma: noted on problem list        Social Drivers of Health    Physical Activity: Unknown (5/17/2024)    Exercise Vital Sign     Days of Exercise per Week: 2 days   Stress: Stress Concern Present (5/17/2024)    Citizen of [place] [employer] - Occupational Stress Questionnaire     Feeling of Stress : To some extent   Social Connections: Unknown (5/17/2024)    Social Connection and Isolation Panel [NHANES]     Frequency of Social Gatherings with Friends and Family: Twice a week          Disposition Plan     Medically Ready for Discharge: Anticipated Tomorrow             Lul Vasquez DO, COLUMBAS  Hospitalist Service, GOLD TEAM 18  [facility]  [facility]  Securely message with "[employer] (more info)  Text page via [employer] Paging/Directory   See signed in provider for up to date coverage information  ______________________________________________________________________    Interval History   Patient feels clinically improved today.  Patient is a registered nurse, with strong clinical insight.  Patient prefers one additional day IV vancomycin.  Wound culture results reviewed.  Can likely transition to oral doxycyline tomorrow.    Physical Exam   Vital Signs: Temp: 98.5  F (36.9  C) Temp src: Oral BP: (!) 145/77 Pulse: 72   Resp: 16 SpO2: 100 % O2 Device: None (Room air)    Weight: 220 lbs 0 oz    GENERAL: Alert and oriented x 3; no acute distress; well-nourished.  HEENT: Normocephalic; atraumatic; PERRLA; MMM.  CV: RRR; normal S1, S2; no rubs, murmurs, or gallops.  RESP: Lung fields clear to aucultation B/L; no wheezing or crepitations.  GI: Abdomen is soft, nontender, nondistended; no organomegaly; normal bowel sounds.  : Deferred genital examination.   MSK: Left knee immobilizer with clean dressing.  DERM: Skin is intact; no rash, lesions, or skin breakdown.  NEURO: No focal deficits appreciated; strength & sensorium are grossly intact.  PSYCH: No active hallucinations; affect, insight appear within normal limits.    Medical Decision Making       55 MINUTES SPENT BY ME on the date of service doing chart review, history, exam, documentation & further activities per the note.      Data     I have personally reviewed the following data over the past 24 hrs:    9.5  \   14.7   / 329     137 103 23.4 (H) /  106 (H)   4.5 22 0.83 \     Procal: 0.06 CRP: N/A Lactic Acid: N/A         Imaging results reviewed over the past 24 hrs:   No results found for this or any previous visit (from the past 24 hours).

## 2025-01-25 NOTE — PHARMACY-ADMISSION MEDICATION HISTORY
Pharmacist Admission Medication History    Admission medication history is complete. The information provided in this note is only as accurate as the sources available at the time of the update.    Information Source(s): Patient and CareEverywhere/SureScripts via phone    Changes made to PTA medication list:  Added: Multivitamin PO daily  Deleted: None  Changed: Fluticasone (Flonase) 50 mcg/actuation, 2 sprays into both nostrils daily as needed for allergies    Allergies reviewed with patient and updates made in EHR: yes    Medication History Completed By: Fabien JohnD 1/25/2025 2:20 PM    PTA Med List   Medication Sig Note Last Dose/Taking    acetaminophen (TYLENOL) 325 MG tablet Take 650 mg by mouth every 6 hours as needed for mild pain.  1/24/2025    albuterol (PROAIR HFA/PROVENTIL HFA/VENTOLIN HFA) 108 (90 Base) MCG/ACT inhaler Inhale 2 puffs into the lungs every 6 hours as needed for shortness of breath, wheezing or cough.  Past Month    fluticasone (FLONASE) 50 MCG/ACT nasal spray Spray 2 sprays into both nostrils daily as needed for allergies.  More than a month    multivitamin w/minerals (THERA-VIT-M) tablet Take 1 tablet by mouth daily.  Past Week    sulfamethoxazole-trimethoprim (BACTRIM DS) 800-160 MG tablet Take 1 tablet by mouth 2 times daily for 7 days. 1/25/2025: Started on 1/22/25 1/24/2025 Evening    vilazodone (VIIBRYD) 40 MG TABS tablet Take 40 mg by mouth daily.  1/24/2025 Morning

## 2025-01-25 NOTE — PHARMACY-VANCOMYCIN DOSING SERVICE
Pharmacy Vancomycin Initial Note  Date of Service 2025  Patient's  1975  49 year old, female    Indication: Skin and Soft Tissue Infection    Current estimated CrCl = Estimated Creatinine Clearance: 102.5 mL/min (based on SCr of 0.77 mg/dL).    Creatinine for last 3 days  No results found for requested labs within last 3 days.    Recent Vancomycin Level(s) for last 3 days  No results found for requested labs within last 3 days.      Vancomycin IV Administrations (past 72 hours)        No vancomycin orders with administrations in past 72 hours.                    Nephrotoxins and other renal medications (From now, onward)      None            Contrast Orders - past 72 hours (72h ago, onward)      None            [employer] Prediction of Planned Initial Vancomycin Regimen  Regimen: 1250 mg IV every 12 hours.  Start time: 21:11 on 2025  Exposure target: AUC24 (range)400-600 mg/L.hr   AUC24,ss: 492 mg/L.hr  Probability of AUC24 > 400: 72 %  Ctrough,ss: 15.4 mg/L  Probability of Ctrough,ss > 20: 28 %  Probability of nephrotoxicity (Lodise ANGELA ): 11 %        Plan:  Start vancomycin 1250 mg IV q12h.   Vancomycin monitoring method: AUC  Vancomycin therapeutic monitoring goal: 400-600 mg*h/L  Pharmacy will check vancomycin levels as appropriate in 1-3 Days.    Serum creatinine levels will be ordered daily for the first week of therapy and at least twice weekly for subsequent weeks.      Sujatha Andres [facility]

## 2025-01-26 VITALS
HEART RATE: 69 BPM | OXYGEN SATURATION: 93 % | BODY MASS INDEX: 35.78 KG/M2 | RESPIRATION RATE: 16 BRPM | DIASTOLIC BLOOD PRESSURE: 59 MMHG | WEIGHT: 220 LBS | TEMPERATURE: 98.2 F | SYSTOLIC BLOOD PRESSURE: 121 MMHG

## 2025-01-26 LAB
CREAT SERPL-MCNC: 0.74 MG/DL (ref 0.51–0.95)
EGFRCR SERPLBLD CKD-EPI 2021: >90 ML/MIN/1.73M2
VANCOMYCIN SERPL-MCNC: 8.6 UG/ML

## 2025-01-26 PROCEDURE — 96376 TX/PRO/DX INJ SAME DRUG ADON: CPT

## 2025-01-26 PROCEDURE — 82565 ASSAY OF CREATININE: CPT | Performed by: INTERNAL MEDICINE

## 2025-01-26 PROCEDURE — G0378 HOSPITAL OBSERVATION PER HR: HCPCS

## 2025-01-26 PROCEDURE — 250N000013 HC RX MED GY IP 250 OP 250 PS 637: Performed by: INTERNAL MEDICINE

## 2025-01-26 PROCEDURE — 258N000003 HC RX IP 258 OP 636: Performed by: INTERNAL MEDICINE

## 2025-01-26 PROCEDURE — 36415 COLL VENOUS BLD VENIPUNCTURE: CPT | Performed by: INTERNAL MEDICINE

## 2025-01-26 PROCEDURE — 80202 ASSAY OF VANCOMYCIN: CPT | Performed by: INTERNAL MEDICINE

## 2025-01-26 PROCEDURE — 250N000011 HC RX IP 250 OP 636: Performed by: INTERNAL MEDICINE

## 2025-01-26 PROCEDURE — 99239 HOSP IP/OBS DSCHRG MGMT >30: CPT | Performed by: INTERNAL MEDICINE

## 2025-01-26 RX ORDER — ACETAMINOPHEN 500 MG
500-1000 TABLET ORAL EVERY 8 HOURS PRN
COMMUNITY
Start: 2025-01-26

## 2025-01-26 RX ORDER — SACCHAROMYCES BOULARDII 250 MG
250 CAPSULE ORAL 2 TIMES DAILY
Qty: 60 CAPSULE | Refills: 0 | Status: SHIPPED | OUTPATIENT
Start: 2025-01-26 | End: 2025-02-25

## 2025-01-26 RX ORDER — DOXYCYCLINE 100 MG/1
100 CAPSULE ORAL 2 TIMES DAILY
Qty: 20 CAPSULE | Refills: 0 | Status: SHIPPED | OUTPATIENT
Start: 2025-01-26 | End: 2025-02-05

## 2025-01-26 RX ADMIN — VANCOMYCIN HYDROCHLORIDE 1500 MG: 10 INJECTION, POWDER, LYOPHILIZED, FOR SOLUTION INTRAVENOUS at 10:06

## 2025-01-26 RX ADMIN — FLUTICASONE PROPIONATE 2 SPRAY: 50 SPRAY, METERED NASAL at 08:42

## 2025-01-26 RX ADMIN — VILAZODONE HYDROCHLORIDE 40 MG: 40 TABLET ORAL at 08:42

## 2025-01-26 ASSESSMENT — ACTIVITIES OF DAILY LIVING (ADL)
ADLS_ACUITY_SCORE: 33

## 2025-01-26 NOTE — PHARMACY-VANCOMYCIN DOSING SERVICE
Pharmacy Vancomycin Note  Date of Service 2025  Patient's  1975   49 year old, female    Indication: Skin and Soft Tissue Infection  Day of Therapy: started 25  Current vancomycin regimen:  1250 mg IV q12h  Current vancomycin monitoring method: AUC  Current vancomycin therapeutic monitoring goal: 400-600 mg*h/L    [employer] Prediction of Current Vancomycin Regimen  Regimen: 1250 mg IV every 12 hours.  Start time: 09:38 on 2025  Exposure target: AUC24 (range)400-600 mg/L.hr   AUC24,ss: 377 mg/L.hr  Probability of AUC24 > 400: 36 %  Ctrough,ss: 9.8 mg/L  Probability of Ctrough,ss > 20: 0 %  Probability of nephrotoxicity (Lodise ANGELA ): 6 %      Current estimated CrCl = Estimated Creatinine Clearance: 95.1 mL/min (based on SCr of 0.83 mg/dL).    Creatinine for last 3 days  2025:  8:59 PM Creatinine 0.83 mg/dL    Recent Vancomycin Levels (past 3 days)  2025:  7:50 AM Vancomycin 8.6 ug/mL    Vancomycin IV Administrations (past 72 hours)                     vancomycin (VANCOCIN) 1,250 mg in 0.9% NaCl 250 mL intermittent infusion (mg) 1,250 mg New Bag 25 2138     1,250 mg New Bag  1009     1,250 mg New Bag 25 2144                    Nephrotoxins and other renal medications (From now, onward)      Start     Dose/Rate Route Frequency Ordered Stop    25 1000  vancomycin (VANCOCIN) 1,500 mg in 0.9% NaCl 265 mL intermittent infusion         1,500 mg  over 90 Minutes Intravenous EVERY 12 HOURS 25 0938                 Contrast Orders - past 72 hours (72h ago, onward)      None            Interpretation of levels and current regimen:  Vancomycin level is reflective of AUC less than 400    Has serum creatinine changed greater than 50% in last 72 hours: No    Urine output:  unable to determine - I/O not documented    Renal Function: Stable    Insight[employer] Prediction of Planned New Vancomycin Regimen  Regimen: 1500 mg IV every 12 hours.  Start time: 09:38 on  01/26/2025  Exposure target: AUC24 (range)400-600 mg/L.hr   AUC24,ss: 452 mg/L.hr  Probability of AUC24 > 400: 77 %  Ctrough,ss: 11.8 mg/L  Probability of Ctrough,ss > 20: 2 %  Probability of nephrotoxicity (Lodise ANGELA 2009): 7 %      Plan:  Increase Dose to vancomycin IV 1500 mg q12h   Vancomycin monitoring method: AUC  Vancomycin therapeutic monitoring goal: 400-600 mg*h/L  Pharmacy will check vancomycin levels as appropriate in 1-3 Days.  Serum creatinine levels will be ordered daily for the first week of therapy and at least twice weekly for subsequent weeks.    Marlo Garcia, [facility]

## 2025-01-26 NOTE — PLAN OF CARE
Goal Outcome Evaluation:      Plan of Care Reviewed With: patient    Overall Patient Progress: improvingOverall Patient Progress: improving      VS: /62 (BP Location: Right arm)   Pulse 73   Temp 97.6  F (36.4  C) (Oral)   Resp 16   Wt 99.8 kg (220 lb)   SpO2 96%   BMI 35.78 kg/m       O2: Sats >90% on RA. Denies CP an SOB>    Output: Voiding without difficulty.    Last BM: LBM 1/24   Activity: Up independently in room.   Skin: Intact except L knee incision.   Pain: Pain managed with prn tylenol.    Neuro: A&O X4. Denies N/T.   Dressing: L knee incisional wound CDI.     Diet: Tolerating regular diet. Denies N/V    LDA: PIV SL between abx not left forearm.   Equipment: IV pole and patient personal belongings.    Plan: Possible discharge home tomorrow with PO antibiotics.   Additional Info:

## 2025-01-26 NOTE — DISCHARGE SUMMARY
[facility]  Hospitalist Discharge Summary      Date of Admission:  1/24/2025  Date of Discharge:  1/26/2025  Discharging Provider: Lul Vasquez DO  Discharge Service: Hospitalist Service, GOLD TEAM 18    Discharge Diagnoses   Cellulitis and infected left knee laceration  Failed outpatient antibiotic therapy  Left knee laceration status post stitching  Obesity  Obstructive sleep apnea  History depressive disorders  Generalized anxiety disorder    Clinically Significant Risk Factors          Follow-ups Needed After Discharge   Follow-up Appointments       Adult [facility] Follow-up and recommended labs and tests      Please follow up with primary care provider at patient's earliest convenience.  Recommend close orthopedic surgery follow up.    Appointments on [place] and/or [facility] (with [facility] or [facility] provider or service). Call [phone] if you haven't heard regarding these appointments within 7 days of discharge.            Please coordinate physical therapy after healing.    Unresulted Labs Ordered in the Past 30 Days of this Admission       Date and Time Order Name Status Description    1/22/2025 11:06 AM BLOOD CULTURE Preliminary         These results will be followed up by primary care provider.    Discharge Disposition   Discharged to home  Condition at discharge: Stable    Hospital Course   Mami Epps is a 49 year old female with past history of obesity, depressive disorders, obstructive sleep apnea, history of basal cell carcinoma, generalized anxiety disorder who presents with redness around the left knee laceration area.  Failed outpatient antibiotic therapy      # Cellulitis and infected left knee area laceration, sepsis not present at admission.  Failed outpatient antibiotic therapy.  Patient started on vancomycin 1250 mg IV twice daily.  Will add a procalcitonin from blood draw earlier.  - Can likely transition to oral doxycycline upon  discharge tomorrow.    # Failed outpatient antibiotic therapy  # Left knee area laceration status post stitching  # Obesity  # Obstructive sleep apnea  # Hx depressive disorders,  generalized anxiety disorder.  - Continue PTA Vilazodone     Consultations This Hospital Stay   PHARMACY TO DOSE VANCO    Code Status   Full Code    Time Spent on this Encounter   ILul DO, personally saw the patient today and spent greater than 30 minutes discharging this patient.       Lul Vasquez DO, MHS  [facility] MED SURG ORTHOPEDIC  [facility] [address]  Phone: [phone]  Fax: [phone]  ______________________________________________________________________       Primary Care Physician   Lorraine Srinivasan    Discharge Orders      Physical Therapy  Referral      Reason for your hospital stay    Patient was admitted to the hospital for left knee cellulitis.     Activity    Your activity upon discharge: activity as tolerated.  Keep left knee immobilized until orthopedic surgery follow up.     Adult [facility] Follow-up and recommended labs and tests    Please follow up with primary care provider at patient's earliest convenience.  Recommend close orthopedic surgery follow up.    Appointments on [place] and/or [facility] (with [facility] or [facility] provider or service). Call [phone] if you haven't heard regarding these appointments within 7 days of discharge.     Diet    Follow this diet upon discharge: Current Diet:Orders Placed This Encounter      Combination Diet Regular Diet Adult       Significant Results and Procedures   Results for orders placed or performed in visit on 01/22/25   XR Knee Left 1/2 Views    Narrative    EXAM: XR KNEE LEFT 1/2 VIEWS  LOCATION: [facility]  DATE: 1/22/2025    INDICATION: complex deep laceration of left knee exposing patella with lac repair 7 days ago and new cellulitis and sx of possible sepsis,    COMPARISON: None.      Impression    IMPRESSION:      Normal left knee joint spacing and alignment. Negative for acute fracture. There is soft tissue swelling over the patella anteriorly. No sizable left knee joint effusion is evident radiographically. No radiographic evidence for osteomyelitis.       Discharge Medications   Current Discharge Medication List        START taking these medications    Details   doxycycline hyclate (VIBRAMYCIN) 100 MG capsule Take 1 capsule (100 mg) by mouth 2 times daily for 10 days.  Qty: 20 capsule, Refills: 0    Associated Diagnoses: Cellulitis of left knee      saccharomyces boulardii (FLORASTOR) 250 MG capsule Take 1 capsule (250 mg) by mouth 2 times daily.  Qty: 60 capsule, Refills: 0    Associated Diagnoses: Long term (current) use of antibiotics           CONTINUE these medications which have CHANGED    Details   acetaminophen (TYLENOL) 500 MG tablet Take 1-2 tablets (500-1,000 mg) by mouth every 8 hours as needed for mild pain.    Associated Diagnoses: Cellulitis of left knee           CONTINUE these medications which have NOT CHANGED    Details   albuterol (PROAIR HFA/PROVENTIL HFA/VENTOLIN HFA) 108 (90 Base) MCG/ACT inhaler Inhale 2 puffs into the lungs every 6 hours as needed for shortness of breath, wheezing or cough.  Qty: 18 g, Refills: 3    Comments: Pharmacy may dispense brand covered by insurance (Proair, or proventil or ventolin or generic albuterol inhaler)  Associated Diagnoses: Mild intermittent asthma with status asthmaticus      fluticasone (FLONASE) 50 MCG/ACT nasal spray Spray 2 sprays into both nostrils daily as needed for allergies.      multivitamin w/minerals (THERA-VIT-M) tablet Take 1 tablet by mouth daily.      vilazodone (VIIBRYD) 40 MG TABS tablet Take 40 mg by mouth daily.           STOP taking these medications       sulfamethoxazole-trimethoprim (BACTRIM DS) 800-160 MG tablet Comments:   Reason for Stopping:             Allergies   Allergies    Allergen Reactions    Contrast Dye Anaphylaxis    Dust Mites Shortness Of Breath     Runny nose, shortness of breath, wheezing

## 2025-01-26 NOTE — PLAN OF CARE
Goal Outcome Evaluation:      Plan of Care Reviewed With: patient    Overall Patient Progress: improving      VS: Temp: 98.2  F (36.8  C) Temp src: Oral BP: 136/61 Pulse: 80   Resp: 16 SpO2: 99 % O2 Device: None (Room air)      O2: SpO2 > 95% and stable on RA. LS clear and equal. Denies chest pain and SOB.    Output: Voids spontaneously without difficulty to bathroom.   Last BM: 1/24/2025, denies abdominal discomfort. passing flatus.    Activity: Up ad cassia   Skin: WDL except, incision to the L knee, small bruise to the L buttock.   Pain: Pain managed with PRN Tylenol.   CMS: Intact, AOx4. Denies numbness and tingling.   Dressing: CDI, Immobilizer to the L knee.   Diet: Regular diet. Denies nausea/vomiting.     LDA: L PIV SL between ABX.   Equipment: IV pole, personal belongings,    Plan: Continue with plan of care and ABX. Call light within reach, pt able to make needs known.     Additional Info  Pt use personal CPAP at night.

## 2025-01-26 NOTE — PLAN OF CARE
Goal Outcome Evaluation:      Plan of Care Reviewed With: patient    Overall Patient Progress: improvingOverall Patient Progress: improving    Outcome Evaluation: Pt is A&Ox4. Transfers independent to bathroom. Minimal pain, pt declined intervention. Pt is able to make needs known, call light within reach. Pt discharged home.

## 2025-01-27 ENCOUNTER — MYC MEDICAL ADVICE (OUTPATIENT)
Dept: FAMILY MEDICINE | Facility: CLINIC | Age: 50
End: 2025-01-27
Payer: COMMERCIAL

## 2025-01-27 LAB — BACTERIA BLD CULT: NO GROWTH

## 2025-01-27 NOTE — DISCHARGE SUMMARY
DISCHARGE SUMMARY    Pt discharging to: home   Transportation: brother via private vehicle   AVS given and discussed: Pt was given AVS and pt states understanding of content. Pt has no further questions.   Stoplight Tool given and discussed: Yes, no further questions.  Medications given: Yes, discussed. No further questions.   Belongings returned: Yes, ensured all belongings packed and sent with pt. No items in security.   Comments: Escorted safely to elevators. Pt left at 1400

## 2025-01-28 ENCOUNTER — OFFICE VISIT (OUTPATIENT)
Dept: ALLERGY | Facility: CLINIC | Age: 50
End: 2025-01-28
Attending: NURSE PRACTITIONER
Payer: COMMERCIAL

## 2025-01-28 ENCOUNTER — PATIENT OUTREACH (OUTPATIENT)
Dept: CARE COORDINATION | Facility: CLINIC | Age: 50
End: 2025-01-28

## 2025-01-28 VITALS — HEART RATE: 80 BPM | OXYGEN SATURATION: 98 % | SYSTOLIC BLOOD PRESSURE: 135 MMHG | DIASTOLIC BLOOD PRESSURE: 82 MMHG

## 2025-01-28 DIAGNOSIS — H10.9 RHINOCONJUNCTIVITIS: Primary | ICD-10-CM

## 2025-01-28 DIAGNOSIS — J31.0 RHINOCONJUNCTIVITIS: Primary | ICD-10-CM

## 2025-01-28 PROCEDURE — 36415 COLL VENOUS BLD VENIPUNCTURE: CPT | Performed by: ALLERGY & IMMUNOLOGY

## 2025-01-28 PROCEDURE — 86003 ALLG SPEC IGE CRUDE XTRC EA: CPT | Performed by: ALLERGY & IMMUNOLOGY

## 2025-01-28 NOTE — PROGRESS NOTES
Mami Epps was seen in the Allergy Clinic at [facility].    Mami Epps is a 49 year old  female being seen today at the request of Sylvie Oakes in consultation for seasonal allergies     Has had chronic nasal congestion since childhood, had allergy skin testing in childhood, remembers allergic response to dust mites. Does not remember other results.     Symptoms are worse in the Spring and fall - nasal congestion, rhinorrhea, eye tearing.     Grew up with a dog and currently has a dog, usually does not cause symptoms. But if dog needs bath ie has dust, does get symptoms     Has been taking Flonase and Zyrtec - have been taking them daily for about 3/4 of a year for 1.5 years, previously was taking claritin. Took Unisom 01/27.     Followed with [facility] in the summer of 2024 and had sinus surgery 10/2024    Moved to [place] 2023 from [place] where she was living for 2 years . Lived in [facility] years prior     Past Medical History:   Diagnosis Date    Generalized anxiety disorder     History of basal cell carcinoma     Major depression     STEVE (obstructive sleep apnea)      Family History   Problem Relation Age of Onset    Colon Cancer Mother     Diabetes Father      Past Surgical History:   Procedure Laterality Date    DISCECTOMY LUMBAR ANTERIOR      HYSTERECTOMY, PAP NO LONGER INDICATED         ENVIRONMENTAL HISTORY:   [place] The home is heated with a forced air. They do have central air conditioning. The patient's bedroom is furnished with feather/wool bedding or pillows and carpeting in bedroom.  Pets inside the house include 1 dog(s). There is no history of cockroach or mice infestation. Do you smoke cigarettes or other recreational drugs? No Do you vape or use an e-cigarette? No. There is/are 0 smokers living in the house. There is/are 0 who smoke ecigarettes/vape living in the house. The house does not  have a damp basement.     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 She lives with her brother and sister-in-law and nephew.        Current Outpatient Medications:     acetaminophen (TYLENOL) 500 MG tablet, Take 1-2 tablets (500-1,000 mg) by mouth every 8 hours as needed for mild pain., Disp: , Rfl:     albuterol (PROAIR HFA/PROVENTIL HFA/VENTOLIN HFA) 108 (90 Base) MCG/ACT inhaler, Inhale 2 puffs into the lungs every 6 hours as needed for shortness of breath, wheezing or cough., Disp: 18 g, Rfl: 3    doxycycline hyclate (VIBRAMYCIN) 100 MG capsule, Take 1 capsule (100 mg) by mouth 2 times daily for 10 days., Disp: 20 capsule, Rfl: 0    fluticasone (FLONASE) 50 MCG/ACT nasal spray, Spray 2 sprays into both nostrils daily as needed for allergies., Disp: , Rfl:     multivitamin w/minerals (THERA-VIT-M) tablet, Take 1 tablet by mouth daily., Disp: , Rfl:     saccharomyces boulardii (FLORASTOR) 250 MG capsule, Take 1 capsule (250 mg) by mouth 2 times daily., Disp: 60 capsule, Rfl: 0    vilazodone (VIIBRYD) 40 MG TABS tablet, Take 40 mg by mouth daily., Disp: , Rfl:   Immunization History   Administered Date(s) Administered    COVID-19 12+ (MODERNA) 09/19/2023    Hepatitis B, Adult 01/13/1993, 02/17/1993, 08/26/1993, 09/16/1998, 01/22/2024    Influenza Vaccine >6 months,quad, PF 09/19/2023    Influenza Vaccine Trivalent (FluBlok) 11/06/2024    TDAP (Adacel,Boostrix) 06/19/2024    Zoster recombinant adjuvanted (SHINGRIX) 05/17/2024     Allergies   Allergen Reactions    Contrast Dye Anaphylaxis    Dust Mites Shortness Of Breath     Runny nose, shortness of breath, wheezing     EXAM:   /82 (BP Location: Right arm, Patient Position: Sitting, Cuff Size: Adult Large)   Pulse 80   SpO2 98%     Physical Exam    General: comfortable, NAD  HEENT: MMM  Cardiovascular: RRR, no murmur appreciated  Respiratory: no increased work of breathing       WORKUP: IgE testing    ASSESSMENT/PLAN:  Mami Epps is a 49-year-old  female here for evaluation for seasonal allergies. Allergy testing in childhood positive for dust mites. Antihistamine use (Unisom) night prior to allergy clinic visit. Plan for IgE testing. Interested in potential immunotherapy if indicated.     Thank you for allowing me to participate in the care of Mami Epps.      A total of *** minutes, outside of separately billable procedures and injections, was spent on the day of the encounter performing chart review, history and exam, documentation, and counseling and coordination of care as noted above.     Patient staffed with Dr. Carmelo Keene MD  Internal Medicine-Pediatrics, PGY-4    Sandhya Rhodes MD, FAAAAI  Allergy/Immunology  [facility]    Consent was obtained from the patient to use an AI documentation tool in the creation of this note.    Chart documentation done in part with Dragon Voice Recognition Software. Although reviewed after completion, some word and grammatical errors may remain.   and was present for the resident's conversation with this patient.  I agree with the resident's documentation and plan of care.  I have reviewed and amended the note above.  The documentation accurately reflects my clinical observations, diagnoses, treatment and follow-up plans.     Thank you for allowing me to participate in the care of Mami COYLE Supriya.      Sandhya Rhodes MD, [facility]  Allergy/Immunology  [facility] - [facility]    Consent was obtained from the patient to use an AI documentation tool in the creation of this note.    Chart documentation done in part with Dragon Voice Recognition Software. Although reviewed after completion, some word and grammatical errors may remain.

## 2025-01-28 NOTE — PROGRESS NOTES
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    I saw her 1/23, and the wound looked good. The next day she had siginficant cellulitis, and was admitted at the . Given a couple of days worth of Vanco, and discharged on Doxycycline. I don't think ID was consulted. Ortho not consulted.    HPI: Patient was out of town and took a fall and hit her knee right on the bone and she had to have surgery on her tendon and her bone was open to air so they started her on antibiotics.     ED note is available and the procedure was done in the ER:  The length of the wound was approximately 10 cm. On exam after washout under local anesthesia, it appears to have just neck ('nicked\") both the prepatellar bursa and patellar tendon, the patella tendon remain mobile and was able to flex and extend the knee. The visible portion of the bursa was closed with 3 sutures chromic gut in in buried interrupted fashion. The proximally 1 cm laceration to the patellar ligament was likewise closed multiple sutures of 4 0 chromic gut in simple interrupted fashion.   Procedure was on 1/14.  Placed on Keflex 500 three times daily x 7 days. Last dose was 1/21.   She went to Urgent Care yesterday with concerns of infection was put on Bactrim, got IM Rocephin.    Present symptoms:   Much less pain x 4 days.  No fever/chills     Other PMH:  has a past medical history of Generalized anxiety disorder, History of basal cell carcinoma, Major depression, and STEVE (obstructive sleep apnea).    Surgical:  has a past surgical history that includes Hysterectomy, Pap No Longer Indicated and Discectomy lumbar anterior.    Family Hx:  family history includes Colon Cancer in her mother; Diabetes in her father.    Social Hx:  reports that she has never smoked. She has never been exposed to tobacco smoke. She has never used " smokeless tobacco. She reports that she does not currently use alcohol. She reports that she does not use drugs.    REVIEW OF SYSTEMS:    CONSTITUTIONAL:  NEGATIVE for fever, chills, change in weight  INTEGUMENTARY/SKIN:  NEGATIVE for worrisome rashes, moles or lesions  EYES:  NEGATIVE for vision changes or irritation  ENT/MOUTH:  NEGATIVE for ear, mouth and throat problems  RESP:  NEGATIVE for significant cough or SOB  BREAST:  NEGATIVE for masses, tenderness or discharge  CV:  NEGATIVE for chest pain, palpitations or peripheral edema  GI:  NEGATIVE for nausea, abdominal pain, heartburn, or change in bowel habits  :  Negative   MUSCULOSKELETAL:  See HPI above  NEURO:  NEGATIVE for weakness, dizziness or paresthesias  ENDOCRINE:  NEGATIVE for temperature intolerance, skin/hair changes  HEME/ALLERGY/IMMUNE:  NEGATIVE for bleeding problems  PSYCHIATRIC:  NEGATIVE for changes in mood or affect    PHYSICAL EXAM:  There were no vitals taken for this visit.   GENERAL APPEARANCE: healthy, alert, and no distress     NEURO: Normal strength and tone, mentation intact, and speech normal  VASCULAR:   good pulses, and cappillary refill   LYMPH: no lymphadenopathy   PSYCH:  mentation appears normal and affect normal/bright  RESP: no increased work of breathing     KNEE EXAM:   SKIN: no suspicious lesions or rashes  Transverse wound,   Wound looks good, no erythema today.  Minimal puffiness of the prepatellar bursa  Alignment: normal   Patellofemoral joint: no extensor lag, or pain   Effusion: moderate  ROM: not tested   Tender: over anterior knee    Labs: a wound culture done in the Family medicine clinic on 1/22 showed Staph Epi.  No cultures of any kind were done while in the hospital, it would appear.     Impression: knee laceration, with partial patellar tendon laceration. Cellulitis of the knee resolving    Plan: we need to monitor her knee for infection.  Sutures out.  Continue antibiotics x 1 more week.  Follow up 1 week  for wound check  Work note written. Off x 3 weeks more, at least.   Knee immobilizer x 1 more week    Ordered physical therapy    Wound re-dressed, knee immobilizer reapplied.   Change dressing daily.    ROMANA Lindo MD  Dept. Orthopedic Surgery  [facility]

## 2025-01-28 NOTE — LETTER
1/28/2025      Mami Epps  [address]  [place] [address]      Dear Colleague,    Thank you for referring your patient, Mami Epps, to the [facility]. Please see a copy of my visit note below.    Mami Epps was seen in the Allergy Clinic at [facility].    Mami Epps is a 49 year old  female being seen today at the request of Sylvie Oakes NP in consultation for seasonal allergies     Has had chronic nasal congestion since childhood, had allergy skin testing in childhood, remembers allergic response to dust mites. Does not remember other results.     Symptoms are worse in the Spring and fall - nasal congestion, rhinorrhea, eye tearing.     Grew up with a dog and currently has a dog, usually does not cause symptoms. But if dog needs bath she does get symptoms.    Has been taking Flonase and Zyrtec - have been taking them daily for about 3/4 of a year for 1.5 years, previously was taking Claritin. Took Unisom 01/27.     Followed with [facility] in the summer of 2024 and had sinus surgery 10/2024    Moved to [place] 2023 from [place] where she was living for 2 years . Lived in [place] [place] 47 years prior     Past Medical History:   Diagnosis Date     Generalized anxiety disorder      History of basal cell carcinoma      Major depression      STEVE (obstructive sleep apnea)      Family History   Problem Relation Age of Onset     Colon Cancer Mother      Diabetes Father      Past Surgical History:   Procedure Laterality Date     DISCECTOMY LUMBAR ANTERIOR       HYSTERECTOMY, PAP NO LONGER INDICATED         ENVIRONMENTAL HISTORY:   [place] The home is heated with a forced air. They do have central air conditioning. The patient's bedroom is furnished with feather/wool bedding or pillows and carpeting in bedroom.  Pets inside the house include 1 dog(s). There is no history of cockroach or mice  infestation. Do you smoke cigarettes or other recreational drugs? No Do you vape or use an e-cigarette? No. There is/are 0 smokers living in the house. There is/are 0 who smoke ecigarettes/vape living in the house. The house does not have a damp basement.     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 She lives with her brother and sister-in-law and nephew.        Current Outpatient Medications:      acetaminophen (TYLENOL) 500 MG tablet, Take 1-2 tablets (500-1,000 mg) by mouth every 8 hours as needed for mild pain., Disp: , Rfl:      albuterol (PROAIR HFA/PROVENTIL HFA/VENTOLIN HFA) 108 (90 Base) MCG/ACT inhaler, Inhale 2 puffs into the lungs every 6 hours as needed for shortness of breath, wheezing or cough., Disp: 18 g, Rfl: 3     doxycycline hyclate (VIBRAMYCIN) 100 MG capsule, Take 1 capsule (100 mg) by mouth 2 times daily for 10 days., Disp: 20 capsule, Rfl: 0     fluticasone (FLONASE) 50 MCG/ACT nasal spray, Spray 2 sprays into both nostrils daily as needed for allergies., Disp: , Rfl:      multivitamin w/minerals (THERA-VIT-M) tablet, Take 1 tablet by mouth daily., Disp: , Rfl:      saccharomyces boulardii (FLORASTOR) 250 MG capsule, Take 1 capsule (250 mg) by mouth 2 times daily., Disp: 60 capsule, Rfl: 0     vilazodone (VIIBRYD) 40 MG TABS tablet, Take 40 mg by mouth daily., Disp: , Rfl:   Immunization History   Administered Date(s) Administered     COVID-19 12+ (MODERNA) 09/19/2023     Hepatitis B, Adult 01/13/1993, 02/17/1993, 08/26/1993, 09/16/1998, 01/22/2024     Influenza Vaccine >6 months,quad, PF 09/19/2023     Influenza Vaccine Trivalent (FluBlok) 11/06/2024     TDAP (Adacel,Boostrix) 06/19/2024     Zoster recombinant adjuvanted (SHINGRIX) 05/17/2024     Allergies   Allergen Reactions     Contrast Dye Anaphylaxis     Dust Mites Shortness Of Breath     Runny nose, shortness of breath, wheezing     EXAM:   /82 (BP Location: Right arm, Patient Position: Sitting, Cuff Size: Adult Large)   Pulse  80   SpO2 98%     Physical Exam    General: comfortable, NAD  HEENT: MMM  Cardiovascular: RRR, no murmur appreciated  Respiratory: no increased work of breathing       WORKUP: IgE testing    ASSESSMENT/PLAN:  Mami Epps is a 49-year-old female here for evaluation for seasonal allergies. Allergy testing in childhood positive for dust mites. Skin testing deferred today due to recent antihistamine use. Briefly reviewed the risks and benefits of allergen immunotherapy treatment. She is a good candidate for allergen immunotherapy given her ongoing symptoms despite daily intranasal steroid and oral antihistamine therapy. She is interested in pursuing this depending on the results of her allergy testing.    1. Rhinoconjunctivitis (Primary)    - Allergen cat epithellium IgE; Future  - Allergen dog epithelium IgE; Future  - Allergen Zbigniew grass IgE; Future  - Allergen kayley IgE; Future  - Allergen D farinae IgE; Future  - Allergen D pteronyssinus IgE; Future  - Allergen alternaria alternata IgE; Future  - Allergen aspergillus fumigatus IgE; Future  - Allergen cladosporium herbarum IgE; Future  - Allergen Epicoccum purpurascens IgE; Future  - Allergen penicillium notatum IgE; Future  - Allergen ric white IgE; Future  - Allergen Cedar IgE; Future  - Allergen cottonwood IgE; Future  - Allergen elm IgE; Future  - Allergen maple box elder IgE; Future  - Allergen oak white IgE; Future  - Allergen Red [place] IgE; Future  - Allergen silver  birch IgE; Future  - Allergen Tree White [place] IgE; Future  - Allergen Walton Tree; Future  - Allergen white pine IgE; Future  - Allergen English plantain IgE; Future  - Allergen giant ragweed IgE; Future  - Allergen lamb's quarter IgE; Future  - Allergen Mugwort IgE; Future  - Allergen ragweed short IgE; Future  - Allergen Sagebrush Wormwood IgE; Future  - Allergen Sheep Sorrel IgE; Future  - Allergen thistle Russian IgE; Future  - Allergen Weed Nettle IgE; Future  - Allergen,  [facility]; Future      Patient staffed with Dr. Carmelo Keene MD  Internal Medicine-Pediatrics, PGY-4      This service has been performed in part by a resident under the direction of a teaching physician. I have personally examined this patient and was present for the resident's conversation with this patient.  I agree with the resident's documentation and plan of care.  I have reviewed and amended the note above.  The documentation accurately reflects my clinical observations, diagnoses, treatment and follow-up plans.     Thank you for allowing me to participate in the care of Mami Epps.      Sandhya Rhodes MD, FAAAAI  Allergy/Immunology  [facility]    Consent was obtained from the patient to use an AI documentation tool in the creation of this note.    Chart documentation done in part with Dragon Voice Recognition Software. Although reviewed after completion, some word and grammatical errors may remain.      Again, thank you for allowing me to participate in the care of your patient.        Sincerely,        Sandhya Rhodes MD    Electronically signed

## 2025-01-28 NOTE — PROGRESS NOTES
Connected Care Resource Center:   [facility] Resource Center Contact  [facility]/Voicemail     Clinical Data: Post-Discharge Outreach     Outreach attempted x 2.  Left message on patient's voicemail, providing [facility]'s central phone number of [phone] ([phone]) for questions/concerns and/or to schedule an appt with an [facility] provider, if they do not have a PCP.      Plan:  [facility] will do no further outreaches at this time.       SAVANNAH Esparza  Connected Care Resource [place], [facility]    *Connected Care Resource Team does NOT follow patient ongoing. Referrals are identified based on internal discharge reports and the outreach is to ensure patient has an understanding of their discharge instructions.

## 2025-01-29 LAB
A ALTERNATA IGE QN: <0.1 KU(A)/L
A FUMIGATUS IGE QN: <0.1 KU(A)/L
C HERBARUM IGE QN: <0.1 KU(A)/L
CALIF WALNUT POLN IGE QN: <0.1 KU(A)/L
CAT DANDER IGG QN: 1.7 KU(A)/L
CEDAR IGE QN: <0.1 KU(A)/L
COMMON RAGWEED IGE QN: <0.1 KU(A)/L
COTTONWOOD IGE QN: <0.1 KU(A)/L
D FARINAE IGE QN: 0.63 KU(A)/L
D PTERONYSS IGE QN: 0.54 KU(A)/L
DOG DANDER+EPITH IGE QN: 1.35 KU(A)/L
E PURPURASCENS IGE QN: <0.1 KU(A)/L
EAST WHITE PINE IGE QN: <0.1 KU(A)/L
ENGL PLANTAIN IGE QN: <0.1 KU(A)/L
FIREBUSH IGE QN: <0.1 KU(A)/L
GIANT RAGWEED IGE QN: <0.1 KU(A)/L
GOOSEFOOT IGE QN: <0.1 KU(A)/L
JOHNSON GRASS IGE QN: <0.1 KU(A)/L
MAPLE IGE QN: <0.1 KU(A)/L
MUGWORT IGE QN: <0.1 KU(A)/L
NETTLE IGE QN: <0.1 KU(A)/L
P NOTATUM IGE QN: <0.1 KU(A)/L
RED MULBERRY IGE QN: <0.1 KU(A)/L
SALTWORT IGE QN: <0.1 KU(A)/L
SHEEP SORREL IGE QN: <0.1 KU(A)/L
SILVER BIRCH IGE QN: <0.1 KU(A)/L
TIMOTHY IGE QN: <0.1 KU(A)/L
WHITE ASH IGE QN: <0.1 KU(A)/L
WHITE ELM IGE QN: <0.1 KU(A)/L
WHITE MULBERRY IGE QN: <0.1 KU(A)/L
WHITE OAK IGE QN: <0.1 KU(A)/L
WORMWOOD IGE QN: <0.1 KU(A)/L

## 2025-01-29 ASSESSMENT — KOOS JR
BENDING TO THE FLOOR TO PICK UP OBJECT: SEVERE
HOW SEVERE IS YOUR KNEE STIFFNESS AFTER FIRST WAKING IN MORNING: SEVERE
GOING UP OR DOWN STAIRS: MILD
STANDING UPRIGHT: MILD
KOOS JR SCORING: 50.01
RISING FROM SITTING: MODERATE
STRAIGHTENING KNEE FULLY: SEVERE
TWISING OR PIVOTING ON KNEE: MODERATE

## 2025-01-30 ENCOUNTER — OFFICE VISIT (OUTPATIENT)
Dept: ORTHOPEDICS | Facility: CLINIC | Age: 50
End: 2025-01-30
Payer: COMMERCIAL

## 2025-01-30 DIAGNOSIS — S81.011D LACERATION OF RIGHT KNEE WITH TENDON INVOLVEMENT, SUBSEQUENT ENCOUNTER: Primary | ICD-10-CM

## 2025-01-30 DIAGNOSIS — L03.115 CELLULITIS OF RIGHT KNEE: ICD-10-CM

## 2025-01-30 DIAGNOSIS — S86.921D LACERATION OF RIGHT KNEE WITH TENDON INVOLVEMENT, SUBSEQUENT ENCOUNTER: Primary | ICD-10-CM

## 2025-02-02 ASSESSMENT — ASTHMA QUESTIONNAIRES
QUESTION_2 LAST FOUR WEEKS HOW OFTEN HAVE YOU HAD SHORTNESS OF BREATH: NOT AT ALL
ACT_TOTALSCORE: 24
QUESTION_5 LAST FOUR WEEKS HOW WOULD YOU RATE YOUR ASTHMA CONTROL: COMPLETELY CONTROLLED
QUESTION_3 LAST FOUR WEEKS HOW OFTEN DID YOUR ASTHMA SYMPTOMS (WHEEZING, COUGHING, SHORTNESS OF BREATH, CHEST TIGHTNESS OR PAIN) WAKE YOU UP AT NIGHT OR EARLIER THAN USUAL IN THE MORNING: NOT AT ALL
QUESTION_4 LAST FOUR WEEKS HOW OFTEN HAVE YOU USED YOUR RESCUE INHALER OR NEBULIZER MEDICATION (SUCH AS ALBUTEROL): ONCE A WEEK OR LESS
ACT_TOTALSCORE: 24
QUESTION_1 LAST FOUR WEEKS HOW MUCH OF THE TIME DID YOUR ASTHMA KEEP YOU FROM GETTING AS MUCH DONE AT WORK, SCHOOL OR AT HOME: NONE OF THE TIME

## 2025-02-03 ENCOUNTER — OFFICE VISIT (OUTPATIENT)
Dept: FAMILY MEDICINE | Facility: CLINIC | Age: 50
End: 2025-02-03
Payer: COMMERCIAL

## 2025-02-03 VITALS
SYSTOLIC BLOOD PRESSURE: 148 MMHG | HEIGHT: 66 IN | OXYGEN SATURATION: 96 % | TEMPERATURE: 98 F | WEIGHT: 222 LBS | DIASTOLIC BLOOD PRESSURE: 90 MMHG | BODY MASS INDEX: 35.68 KG/M2 | HEART RATE: 78 BPM

## 2025-02-03 DIAGNOSIS — S86.921D: Primary | ICD-10-CM

## 2025-02-03 DIAGNOSIS — E66.01 MORBID OBESITY (H): ICD-10-CM

## 2025-02-03 DIAGNOSIS — K80.20 CALCULUS OF GALLBLADDER WITHOUT CHOLECYSTITIS WITHOUT OBSTRUCTION: ICD-10-CM

## 2025-02-03 DIAGNOSIS — S81.011D: Primary | ICD-10-CM

## 2025-02-03 DIAGNOSIS — J45.22 MILD INTERMITTENT ASTHMA WITH STATUS ASTHMATICUS: ICD-10-CM

## 2025-02-03 DIAGNOSIS — Z78.9 NON-SMOKER: ICD-10-CM

## 2025-02-03 DIAGNOSIS — K76.0 HEPATIC STEATOSIS: ICD-10-CM

## 2025-02-03 PROBLEM — Z85.828 HISTORY OF BASAL CELL CANCER: Status: ACTIVE | Noted: 2021-11-09

## 2025-02-03 PROBLEM — F33.0 MAJOR DEPRESSIVE DISORDER, RECURRENT EPISODE, MILD: Status: ACTIVE | Noted: 2021-11-09

## 2025-02-03 PROBLEM — L20.84 INTRINSIC ECZEMA: Status: RESOLVED | Noted: 2024-11-26 | Resolved: 2025-02-03

## 2025-02-03 PROBLEM — Z80.0 FAMILY HISTORY OF COLON CANCER: Status: ACTIVE | Noted: 2021-11-09

## 2025-02-03 PROCEDURE — 99495 TRANSJ CARE MGMT MOD F2F 14D: CPT | Performed by: STUDENT IN AN ORGANIZED HEALTH CARE EDUCATION/TRAINING PROGRAM

## 2025-02-03 NOTE — PROGRESS NOTES
Assessment & Plan     Laceration of knee with tendon involvement, right, subsequent encounter      Hepatic steatosis  Stable ,recommend weight loss  - semaglutide-weight management (WEGOVY) 0.25 MG/0.5ML pen; Inject 0.5 mLs (0.25 mg) subcutaneously once a week.    Mild intermittent asthma with status asthmaticus  Stable, continue albuterol PRN    Morbid obesity (H)  She has tried and failed lifestyle modification. Pt is aware of the importance of diet and exercise  And how her overall health is impacted by her wieght    - semaglutide-weight management (WEGOVY) 0.25 MG/0.5ML pen; Inject 0.5 mLs (0.25 mg) subcutaneously once a week.    Calculus of gallbladder without cholecystitis without obstruction  Stable, we will continue to monitor        MED REC REQUIREDPost Medication Reconciliation Status:  Discharge medications reconciled, continue medications without change        Subjective   Mami is a 49 year old, presenting for the following health issues:  Hospital F/U        2/3/2025     2:43 PM   Additional Questions   Roomed by Tenisha PALACIOS         2/3/2025     2:43 PM   Patient Reported Additional Medications   Patient reports taking the following new medications No new medications to add     HPI       Hospital Follow-up Visit:    Hospital/Nursing Home/IP Rehab Facility: [facility]  Date of Admission: 1/24  Date of Discharge: 1/26  Reason(s) for Admission: Cellulitis and infected left knee laceration  Failed outpatient antibiotic therapy  Left knee laceration status post stitching  Obesity  Obstructive sleep apnea  History depressive disorders  Generalized anxiety disorder  Was the patient in the ICU or did the patient experience delirium during hospitalization?  No  Do you have any other stressors you would like to discuss with your provider? Health Concerns    Problems taking medications regularly:  None  Medication changes since discharge: None  Problems adhering to  "non-medication therapy:  none    Summary of hospitalization:  [facility] discharge summary reviewed  Diagnostic Tests/Treatments reviewed.  Follow up needed: ortho  Other Healthcare Providers Involved in Patient s Care:         None  Update since discharge: improved.     The patient was out of town when she fell and hit her knee directly on the bone. She required surgery to repair her tendon, and her bone was exposed to the air, so she was started on antibiotics.    She had the repair done on 1/14 and was prescribed Keflex 500 mg three times daily. Yesterday, she visited Urgent Care due to concerns about infection and was prescribed Bactrim and given Rocephin. The following day, she noticed redness and went to the hospital, where she was admitted. She was started on vancomycin and later discharged with a prescription for doxycycline, which she is still taking.    She was on Wegovy for weight loss however  she developed significant GI side effects when she was using the 2.4 mg dose.  She would like to restart at  low-dose.  Patient has morbid obesity.  Comorbidities include hepatic steatosis.    She was recently diagnosed with asthma responsive to albuterol as needed .    Cholelithiasis found on CT. NO symptoms in the stomach  plan of care communicated with patient           Review of Systems  Constitutional, neuro, ENT, endocrine, pulmonary, cardiac, gastrointestinal, genitourinary, musculoskeletal, integument and psychiatric systems are negative, except as otherwise noted.      Objective    BP (!) 148/90   Pulse 78   Temp 98  F (36.7  C) (Oral)   Ht 1.67 m (5' 5.75\")   Wt 100.7 kg (222 lb)   SpO2 96%   BMI 36.10 kg/m    Body mass index is 36.1 kg/m .    Patient left before able to recheck BP.  Tenisha Serna MA    Physical Exam   GENERAL: alert and no distress  RESP: no audible wheezes  LEFT KNEE: wound is I/c/d no erythema.            Signed Electronically by: Lorraine Srinivasan MD    "

## 2025-02-03 NOTE — PATIENT INSTRUCTIONS
Luan Bolaños,    Thank you for allowing [facility] to manage your care.        I sent your prescriptions to your pharmacy.      For your convenience, test results are released as soon as they are available  Please allow 1-2 business days for me to send you a comment about your results.  If not done so, I encourage you to login into [email or web address] ([email or web address] to review your results in real time.     If you have any questions or concerns, please feel free to call us at [phone].    Sincerely,    Dr. Srinivasan    Did you know?      You can schedule a video visit for follow-up appointments as well as future appointments for certain conditions.  Please see the below link.     [email or web address]    If you have not already done so,  I encourage you to sign up for [employer] ([email or web address]).  This will allow you to review your results, securely communicate with a provider, and schedule virtual visits as well.

## 2025-02-06 ENCOUNTER — MYC MEDICAL ADVICE (OUTPATIENT)
Dept: FAMILY MEDICINE | Facility: CLINIC | Age: 50
End: 2025-02-06
Payer: COMMERCIAL

## 2025-02-06 DIAGNOSIS — E66.01 CLASS 2 SEVERE OBESITY WITH SERIOUS COMORBIDITY AND BODY MASS INDEX (BMI) OF 37.0 TO 37.9 IN ADULT, UNSPECIFIED OBESITY TYPE (H): Primary | ICD-10-CM

## 2025-02-06 DIAGNOSIS — E66.812 CLASS 2 SEVERE OBESITY WITH SERIOUS COMORBIDITY AND BODY MASS INDEX (BMI) OF 37.0 TO 37.9 IN ADULT, UNSPECIFIED OBESITY TYPE (H): Primary | ICD-10-CM

## 2025-02-12 ASSESSMENT — KOOS JR
STRAIGHTENING KNEE FULLY: SEVERE
GOING UP OR DOWN STAIRS: MILD
HOW SEVERE IS YOUR KNEE STIFFNESS AFTER FIRST WAKING IN MORNING: SEVERE
RISING FROM SITTING: MILD
KOOS JR SCORING: 59.38
BENDING TO THE FLOOR TO PICK UP OBJECT: SEVERE

## 2025-02-13 ENCOUNTER — OFFICE VISIT (OUTPATIENT)
Dept: ORTHOPEDICS | Facility: CLINIC | Age: 50
End: 2025-02-13
Payer: COMMERCIAL

## 2025-02-13 VITALS — HEART RATE: 74 BPM | DIASTOLIC BLOOD PRESSURE: 85 MMHG | SYSTOLIC BLOOD PRESSURE: 153 MMHG | OXYGEN SATURATION: 99 %

## 2025-02-13 DIAGNOSIS — S86.921D LACERATION OF RIGHT KNEE WITH TENDON INVOLVEMENT, SUBSEQUENT ENCOUNTER: Primary | ICD-10-CM

## 2025-02-13 DIAGNOSIS — S81.011D LACERATION OF RIGHT KNEE WITH TENDON INVOLVEMENT, SUBSEQUENT ENCOUNTER: Primary | ICD-10-CM

## 2025-02-13 NOTE — LETTER
"2/13/2025      Mami Epps  [address]  [place] [address]      Dear Colleague,    Thank you for referring your patient, Mami Epps, to the [facility]. Please see a copy of my visit note below.    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 Cellulitis of the knee resolving-- laceration that occurred on 1/14.    I saw her 1/23, and the wound looked good. The next day she had siginficant cellulitis, and was admitted at the . Given a couple of days worth of Vanco, and discharged on Doxycycline. I don't think ID was consulted. Ortho not consulted.    HPI: Patient was out of town and took a fall and hit her knee right on the bone and she had to have surgery on her tendon and her bone was open to air so they started her on antibiotics.     ED note is available and the procedure was done in the ER:  The length of the wound was approximately 10 cm. On exam after washout under local anesthesia, it appears to have just neck ('nicked\") both the prepatellar bursa and patellar tendon, the patella tendon remain mobile and was able to flex and extend the knee. The visible portion of the bursa was closed with 3 sutures chromic gut in in buried interrupted fashion. The proximally 1 cm laceration to the patellar ligament was likewise closed multiple sutures of 4 0 chromic gut in simple interrupted fashion.   Procedure was on 1/14.  Placed on Keflex 500 three times daily x 7 days. Last dose was 1/21.   She went to Urgent Care yesterday with concerns of infection was put on Bactrim, got IM Rocephin.    1/30:  Plan: we need to monitor her knee for infection.  Sutures out.  Continue antibiotics x 1 more week.  Follow up 1 week for wound check  Work note written. Off x 3 weeks more, at least.   Knee immobilizer x 1 more week    Ordered physical therapy    Wound " re-dressed, knee immobilizer reapplied.   Change dressing daily.Present symptoms:   Much less pain x 4 days.  No fever/chills     Present symptoms:  No further redness.  Some burning pain   Still using knee immobilizer full time.    REVIEW OF SYSTEMS:    CONSTITUTIONAL:  NEGATIVE for fever, chills, change in weight  INTEGUMENTARY/SKIN:  NEGATIVE for worrisome rashes, moles or lesions  EYES:  NEGATIVE for vision changes or irritation  ENT/MOUTH:  NEGATIVE for ear, mouth and throat problems  RESP:  NEGATIVE for significant cough or SOB  BREAST:  NEGATIVE for masses, tenderness or discharge  CV:  NEGATIVE for chest pain, palpitations or peripheral edema  GI:  NEGATIVE for nausea, abdominal pain, heartburn, or change in bowel habits  :  Negative   MUSCULOSKELETAL:  See HPI above  NEURO:  NEGATIVE for weakness, dizziness or paresthesias  ENDOCRINE:  NEGATIVE for temperature intolerance, skin/hair changes  HEME/ALLERGY/IMMUNE:  NEGATIVE for bleeding problems  PSYCHIATRIC:  NEGATIVE for changes in mood or affect    PHYSICAL EXAM:  BP (!) 153/85 (BP Location: Left arm, Patient Position: Sitting, Cuff Size: Adult Regular)   Pulse 74   SpO2 99%    GENERAL APPEARANCE: healthy, alert, and no distress     NEURO: Normal strength and tone, mentation intact, and speech normal  VASCULAR:   good pulses, and cappillary refill   LYMPH: no lymphadenopathy   PSYCH:  mentation appears normal and affect normal/bright  RESP: no increased work of breathing     KNEE EXAM:   SKIN: no suspicious lesions or rashes  Transverse wound,   Wound looks good, no erythema today. Wound healed. Tiny scab mid wound.  Less puffiness of the prepatellar bursa  Alignment: normal   Patellofemoral joint: no extensor lag, or pain   Effusion: mild  ROM: 0-60 with some pain.  Tender: over anterior knee     Impression: knee laceration, with partial patellar tendon laceration. Cellulitis of the knee resolved    Plan: use knee immobilizer for walking, otherwise  can have it off at rest  Tscope brace ordered to be set 0-90 ordered. To wear with walking outside basically to prevent hyperflexion with a fall. She has no range of motion restrictions,, and can remove at rest.  Physical therapy set up for next week.  Work: she has to be able to do 12 hour shifts. She can't do this for at least 3 more weeks. Could be longer.       ROMANA Lindo MD  Dept. Orthopedic Surgery  [facility]     Again, thank you for allowing me to participate in the care of your patient.        Sincerely,        Kevin Lindo MD    Electronically signed

## 2025-02-13 NOTE — Clinical Note
2025    Mami Epps   1975        To Whom it May Concern;    Please excuse Mami Epps from work/school for a healthcare visit on 2025.    Sincerely,        Kevin Lindo MD

## 2025-02-13 NOTE — PROGRESS NOTES
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 Cellulitis of the knee resolving-- laceration that occurred on 1/14.    I saw her 1/23, and the wound looked good. The next day she had siginficant cellulitis, and was admitted at the . Given a couple of days worth of Vanco, and discharged on Doxycycline. I don't think ID was consulted. Ortho not consulted.    HPI: Patient was out of town and took a fall and hit her knee right on the bone and she had to have surgery on her tendon and her bone was open to air so they started her on antibiotics.     ED note is available and the procedure was done in the ER:  The length of the wound was approximately 10 cm. On exam after washout under local anesthesia, it appears to have just neck ('nicked\") both the prepatellar bursa and patellar tendon, the patella tendon remain mobile and was able to flex and extend the knee. The visible portion of the bursa was closed with 3 sutures chromic gut in in buried interrupted fashion. The proximally 1 cm laceration to the patellar ligament was likewise closed multiple sutures of 4 0 chromic gut in simple interrupted fashion.   Procedure was on 1/14.  Placed on Keflex 500 three times daily x 7 days. Last dose was 1/21.   She went to Urgent Care yesterday with concerns of infection was put on Bactrim, got IM Rocephin.    1/30:  Plan: we need to monitor her knee for infection.  Sutures out.  Continue antibiotics x 1 more week.  Follow up 1 week for wound check  Work note written. Off x 3 weeks more, at least.   Knee immobilizer x 1 more week    Ordered physical therapy    Wound re-dressed, knee immobilizer reapplied.   Change dressing daily.Present symptoms:   Much less pain x 4 days.  No fever/chills     Present symptoms:  No further redness.  Some burning pain   Still using knee immobilizer full time.    REVIEW " OF SYSTEMS:    CONSTITUTIONAL:  NEGATIVE for fever, chills, change in weight  INTEGUMENTARY/SKIN:  NEGATIVE for worrisome rashes, moles or lesions  EYES:  NEGATIVE for vision changes or irritation  ENT/MOUTH:  NEGATIVE for ear, mouth and throat problems  RESP:  NEGATIVE for significant cough or SOB  BREAST:  NEGATIVE for masses, tenderness or discharge  CV:  NEGATIVE for chest pain, palpitations or peripheral edema  GI:  NEGATIVE for nausea, abdominal pain, heartburn, or change in bowel habits  :  Negative   MUSCULOSKELETAL:  See HPI above  NEURO:  NEGATIVE for weakness, dizziness or paresthesias  ENDOCRINE:  NEGATIVE for temperature intolerance, skin/hair changes  HEME/ALLERGY/IMMUNE:  NEGATIVE for bleeding problems  PSYCHIATRIC:  NEGATIVE for changes in mood or affect    PHYSICAL EXAM:  BP (!) 153/85 (BP Location: Left arm, Patient Position: Sitting, Cuff Size: Adult Regular)   Pulse 74   SpO2 99%    GENERAL APPEARANCE: healthy, alert, and no distress     NEURO: Normal strength and tone, mentation intact, and speech normal  VASCULAR:   good pulses, and cappillary refill   LYMPH: no lymphadenopathy   PSYCH:  mentation appears normal and affect normal/bright  RESP: no increased work of breathing     KNEE EXAM:   SKIN: no suspicious lesions or rashes  Transverse wound,   Wound looks good, no erythema today. Wound healed. Tiny scab mid wound.  Less puffiness of the prepatellar bursa  Alignment: normal   Patellofemoral joint: no extensor lag, or pain   Effusion: mild  ROM: 0-60 with some pain.  Tender: over anterior knee     Impression: knee laceration, with partial patellar tendon laceration. Cellulitis of the knee resolved    Plan: use knee immobilizer for walking, otherwise can have it off at rest  Tscope brace ordered to be set 0-90 ordered. To wear with walking outside basically to prevent hyperflexion with a fall. She has no range of motion restrictions,, and can remove at rest.  Physical therapy set up for  next week.  Work: she has to be able to do 12 hour shifts. She can't do this for at least 3 more weeks. Could be longer.       ROMANA Lindo MD  Dept. Orthopedic Surgery  [facility]

## 2025-02-13 NOTE — LETTER
February 13, 2025      Mami Epps  [address]  [place] [address]        To Whom It May Concern:    Mami Epps  was seen on 02-13-25.  Please excuse her from work for three weeks due to an injury. If you have questions or concerns please call our office at [phone].    Sincerely,        Kevin Lindo MD    Electronically signed

## 2025-02-19 ENCOUNTER — THERAPY VISIT (OUTPATIENT)
Dept: PHYSICAL THERAPY | Facility: CLINIC | Age: 50
End: 2025-02-19
Payer: COMMERCIAL

## 2025-02-19 DIAGNOSIS — M25.562 ACUTE PAIN OF LEFT KNEE: Primary | ICD-10-CM

## 2025-02-19 DIAGNOSIS — S86.921D LACERATION OF RIGHT KNEE WITH TENDON INVOLVEMENT, SUBSEQUENT ENCOUNTER: ICD-10-CM

## 2025-02-19 DIAGNOSIS — S81.011D LACERATION OF RIGHT KNEE WITH TENDON INVOLVEMENT, SUBSEQUENT ENCOUNTER: ICD-10-CM

## 2025-02-19 PROCEDURE — 97161 PT EVAL LOW COMPLEX 20 MIN: CPT | Mod: GP

## 2025-02-19 PROCEDURE — 97110 THERAPEUTIC EXERCISES: CPT | Mod: GP

## 2025-02-19 ASSESSMENT — ACTIVITIES OF DAILY LIVING (ADL)
STAND: ACTIVITY IS SOMEWHAT DIFFICULT
AS_A_RESULT_OF_YOUR_KNEE_INJURY,_HOW_WOULD_YOU_RATE_YOUR_CURRENT_LEVEL_OF_DAILY_ACTIVITY?: ABNORMAL
RISE FROM A CHAIR: ACTIVITY IS FAIRLY DIFFICULT
PAIN: THE SYMPTOM AFFECTS MY ACTIVITY SLIGHTLY
SWELLING: I HAVE THE SYMPTOM BUT IT DOES NOT AFFECT MY ACTIVITY
HOW_WOULD_YOU_RATE_THE_OVERALL_FUNCTION_OF_YOUR_KNEE_DURING_YOUR_USUAL_DAILY_ACTIVITIES?: SEVERELY ABNORMAL
KNEEL ON THE FRONT OF YOUR KNEE: I AM UNABLE TO DO THE ACTIVITY
RAW_SCORE: 28
STIFFNESS: THE SYMPTOM AFFECTS MY ACTIVITY SEVERELY
LIMPING: THE SYMPTOM AFFECTS MY ACTIVITY SEVERELY
SQUAT: I AM UNABLE TO DO THE ACTIVITY
WALK: ACTIVITY IS SOMEWHAT DIFFICULT
KNEEL ON THE FRONT OF YOUR KNEE: I AM UNABLE TO DO THE ACTIVITY
GO DOWN STAIRS: ACTIVITY IS SOMEWHAT DIFFICULT
SIT WITH YOUR KNEE BENT: ACTIVITY IS FAIRLY DIFFICULT
KNEE_ACTIVITY_OF_DAILY_LIVING_SUM: 28
WEAKNESS: THE SYMPTOM AFFECTS MY ACTIVITY SEVERELY
PLEASE_INDICATE_YOR_PRIMARY_REASON_FOR_REFERRAL_TO_THERAPY:: KNEE
HOW_WOULD_YOU_RATE_THE_OVERALL_FUNCTION_OF_YOUR_KNEE_DURING_YOUR_USUAL_DAILY_ACTIVITIES?: SEVERELY ABNORMAL
STAND: ACTIVITY IS SOMEWHAT DIFFICULT
WALK: ACTIVITY IS SOMEWHAT DIFFICULT
GIVING WAY, BUCKLING OR SHIFTING OF KNEE: THE SYMPTOM AFFECTS MY ACTIVITY MODERATELY
SWELLING: I HAVE THE SYMPTOM BUT IT DOES NOT AFFECT MY ACTIVITY
GO UP STAIRS: ACTIVITY IS SOMEWHAT DIFFICULT
RISE FROM A CHAIR: ACTIVITY IS FAIRLY DIFFICULT
PAIN: THE SYMPTOM AFFECTS MY ACTIVITY SLIGHTLY
KNEE_ACTIVITY_OF_DAILY_LIVING_SCORE: 40
SQUAT: I AM UNABLE TO DO THE ACTIVITY
GO DOWN STAIRS: ACTIVITY IS SOMEWHAT DIFFICULT
GO UP STAIRS: ACTIVITY IS SOMEWHAT DIFFICULT
HOW_WOULD_YOU_RATE_THE_CURRENT_FUNCTION_OF_YOUR_KNEE_DURING_YOUR_USUAL_DAILY_ACTIVITIES_ON_A_SCALE_FROM_0_TO_100_WITH_100_BEING_YOUR_LEVEL_OF_KNEE_FUNCTION_PRIOR_TO_YOUR_INJURY_AND_0_BEING_THE_INABILITY_TO_PERFORM_ANY_OF_YOUR_USUAL_DAILY_ACTIVITIES?: 25
WEAKNESS: THE SYMPTOM AFFECTS MY ACTIVITY SEVERELY
SIT WITH YOUR KNEE BENT: ACTIVITY IS FAIRLY DIFFICULT
LIMPING: THE SYMPTOM AFFECTS MY ACTIVITY SEVERELY
STIFFNESS: THE SYMPTOM AFFECTS MY ACTIVITY SEVERELY
AS_A_RESULT_OF_YOUR_KNEE_INJURY,_HOW_WOULD_YOU_RATE_YOUR_CURRENT_LEVEL_OF_DAILY_ACTIVITY?: ABNORMAL
GIVING WAY, BUCKLING OR SHIFTING OF KNEE: THE SYMPTOM AFFECTS MY ACTIVITY MODERATELY
HOW_WOULD_YOU_RATE_THE_CURRENT_FUNCTION_OF_YOUR_KNEE_DURING_YOUR_USUAL_DAILY_ACTIVITIES_ON_A_SCALE_FROM_0_TO_100_WITH_100_BEING_YOUR_LEVEL_OF_KNEE_FUNCTION_PRIOR_TO_YOUR_INJURY_AND_0_BEING_THE_INABILITY_TO_PERFORM_ANY_OF_YOUR_USUAL_DAILY_ACTIVITIES?: 25

## 2025-02-19 NOTE — PROGRESS NOTES
PHYSICAL THERAPY EVALUATION  Type of Visit: Evaluation       Fall Risk Screen:  Fall screen completed by: PT  Have you fallen 2 or more times in the past year?: Yes  Have you fallen and had an injury in the past year?: Yes  Is patient a fall risk?: No    Subjective         Presenting condition or subjective complaint: Open laceration of tendon and prepatellar bursa; here to restore ROM and strengtg.    Pt presents to PT with L knee pain post-fall in Jan 2025.     Pt was on vacation when she tripped and fell onto L knee (corner of brick step went into knee), causing a deep laceration that required stitches in the ER. A week later, was diagnosed with cellulitis and placed on antibiotics.     Xray was normal besides edema.     Currently no infection, however continued to deal with limitations and pain.     Saw Dr. Lindo who ordered a tscope brace to be locked from 0-90 during ambulation to prevent any hyperflexion injury. Pt hasn't yet received this brace.     Main complaints: stiff/tightness, catching, burning    Not wearing immobilizer at home, has been trying some exercises. 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 Work has been supportive.      Date of onset: 01/14/25    Relevant medical history:     Dates & types of surgery: Microscopic lumbar diskectomy of L4L5 and L5S1,hyseterectomy Nov 1,2020    Prior diagnostic imaging/testing results: X-ray     Prior therapy history for the same diagnosis, illness or injury: No      Living Environment  Social support: With family members   Type of home: House; 2-story; Basement   Stairs to enter the home: Yes 5 Is there a railing: Yes     Ramp: No   Stairs inside the home: Yes 25 Is there a railing: Yes     Help at home: None  Equipment owned: Crutches; Bath bench     Employment: Yes Registered nurse  Hobbies/Interests: Floral arranging,walking my dog, attending [employer].    Patient goals for therapy:      Pain  assessment: Pain present  Location: L knee/Ratin/10 in sitting, 5/10 with bending     Objective   KNEE EVALUATION  INTEGUMENTARY (edema, incisions):  healed incision,   POSTURE:  maintains L knee extension throughout session including ambulation without immobilizer  GAIT:  Without immobilizer: decreased L knee flexion, increased hip circumduction, decreased L stance, decreased gait speed  BALANCE/PROPRIOCEPTION:  not tested today, test later    ROM:  0-60 flex on L    STRENGTH:  SLR no lag, did not test MMT due to ROM restrictions  FLEXIBILITY:  decreased L hamstring, decreased L quad  FUNCTIONAL TESTS: Double Leg Squat: uses UE and RLE, LLE extended and not used - reports burning with transition  PALPATION:  slight numbness around knee, tender medial knee  JOINT MOBILITY: restricted patellar mobility inferior/medial/lateral    Assessment & Plan   CLINICAL IMPRESSIONS  Medical Diagnosis: L knee pain    Treatment Diagnosis: L knee pain   Impression/Assessment: Patient is a 49 year old female with L knee complaints.  The following significant findings have been identified: Pain, Decreased ROM/flexibility, Decreased joint mobility, Decreased strength, Impaired balance, Impaired gait, Impaired muscle performance, and Decreased activity tolerance. These impairments interfere with their ability to perform self care tasks, work tasks, recreational activities, household chores, driving , household mobility, and community mobility as compared to previous level of function.     Clinical Decision Making (Complexity):  Clinical Presentation: Stable/Uncomplicated  Clinical Presentation Rationale: based on medical and personal factors listed in PT evaluation  Clinical Decision Making (Complexity): Low complexity    PLAN OF CARE  Treatment Interventions:  Interventions: Gait Training, Manual Therapy, Neuromuscular Re-education, Therapeutic Activity, Therapeutic Exercise    Long Term Goals     PT Goal 1  Goal Identifier:  ambulation  Goal Description: pt will demo 200' of normal ambulation with no device or deviations  Rationale: to maximize safety and independence with performance of ADLs and functional tasks;to maximize safety and independence within the community;to maximize safety and independence within the home  Target Date: 04/02/25  PT Goal 2  Goal Identifier: work  Goal Description: pt will return to 3 12-hour work shifts without limitations  Rationale: to maximize safety and independence with performance of ADLs and functional tasks;to maximize safety and independence within the community;to maximize safety and independence within the home  Target Date: 04/16/25      Frequency of Treatment: 1x/week  Duration of Treatment: 12 weeks    Recommended Referrals to Other Professionals:   Education Assessment:   Learner/Method: Patient;No Barriers to Learning  Education Comments: educated on diagnosis, prognosis, expectations of therapy, and importance of movement    Risks and benefits of evaluation/treatment have been explained.   Patient/Family/caregiver agrees with Plan of Care.     Evaluation Time:     PT Eval, Low Complexity Minutes (42474): 15     Signing Clinician: KELIN SHEFFIELD PT

## 2025-02-20 DIAGNOSIS — J30.81 ALLERGIC RHINITIS DUE TO ANIMALS: Primary | ICD-10-CM

## 2025-02-20 DIAGNOSIS — J30.89 ALLERGIC RHINITIS DUE TO DUST MITE: ICD-10-CM

## 2025-02-28 ENCOUNTER — MYC MEDICAL ADVICE (OUTPATIENT)
Dept: ORTHOPEDICS | Facility: CLINIC | Age: 50
End: 2025-02-28

## 2025-02-28 PROBLEM — S81.011D: Status: ACTIVE | Noted: 2025-02-28

## 2025-02-28 PROBLEM — S86.921D: Status: ACTIVE | Noted: 2025-02-28

## 2025-03-04 ENCOUNTER — THERAPY VISIT (OUTPATIENT)
Dept: PHYSICAL THERAPY | Facility: CLINIC | Age: 50
End: 2025-03-04
Attending: ORTHOPAEDIC SURGERY
Payer: COMMERCIAL

## 2025-03-04 DIAGNOSIS — M25.562 ACUTE PAIN OF LEFT KNEE: ICD-10-CM

## 2025-03-04 DIAGNOSIS — S81.011D LACERATION OF RIGHT KNEE WITH TENDON INVOLVEMENT, SUBSEQUENT ENCOUNTER: Primary | ICD-10-CM

## 2025-03-04 DIAGNOSIS — S86.921D LACERATION OF RIGHT KNEE WITH TENDON INVOLVEMENT, SUBSEQUENT ENCOUNTER: Primary | ICD-10-CM

## 2025-03-04 PROCEDURE — 97140 MANUAL THERAPY 1/> REGIONS: CPT | Mod: GP

## 2025-03-04 PROCEDURE — 97110 THERAPEUTIC EXERCISES: CPT | Mod: GP

## 2025-03-04 PROCEDURE — 97116 GAIT TRAINING THERAPY: CPT | Mod: GP

## 2025-03-06 ENCOUNTER — OFFICE VISIT (OUTPATIENT)
Dept: ORTHOPEDICS | Facility: CLINIC | Age: 50
End: 2025-03-06
Payer: COMMERCIAL

## 2025-03-06 ENCOUNTER — MEDICAL CORRESPONDENCE (OUTPATIENT)
Dept: HEALTH INFORMATION MANAGEMENT | Facility: CLINIC | Age: 50
End: 2025-03-06

## 2025-03-06 VITALS — HEART RATE: 84 BPM | OXYGEN SATURATION: 97 % | HEIGHT: 66 IN | BODY MASS INDEX: 36.27 KG/M2

## 2025-03-06 DIAGNOSIS — S81.011D LACERATION OF RIGHT KNEE WITH TENDON INVOLVEMENT, SUBSEQUENT ENCOUNTER: Primary | ICD-10-CM

## 2025-03-06 DIAGNOSIS — S86.921D LACERATION OF RIGHT KNEE WITH TENDON INVOLVEMENT, SUBSEQUENT ENCOUNTER: Primary | ICD-10-CM

## 2025-03-06 ASSESSMENT — KOOS JR
GOING UP OR DOWN STAIRS: MODERATE
RISING FROM SITTING: MILD
HOW SEVERE IS YOUR KNEE STIFFNESS AFTER FIRST WAKING IN MORNING: MODERATE
KOOS JR SCORING: 61.58
TWISING OR PIVOTING ON KNEE: MODERATE
STRAIGHTENING KNEE FULLY: MILD
BENDING TO THE FLOOR TO PICK UP OBJECT: MILD
STANDING UPRIGHT: MILD

## 2025-03-06 ASSESSMENT — PAIN SCALES - GENERAL: PAINLEVEL_OUTOF10: MILD PAIN (2)

## 2025-03-06 NOTE — LETTER
"3/6/2025      Mami Epps  [address]  [place] [address]      Dear Colleague,    Thank you for referring your patient, Mami Epps, to the [facility]. Please see a copy of my visit note below.    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 Cellulitis of the knee resolving-- laceration that occurred on 1/14.    I saw her 1/23, and the wound looked good. The next day she had siginficant cellulitis, and was admitted at the . Given a couple of days worth of Vanco, and discharged on Doxycycline. I don't think ID was consulted. Ortho not consulted.    HPI: Patient was out of town and took a fall and hit her knee right on the bone and she had to have surgery on her tendon and her bone was open to air so they started her on antibiotics.     ED note is available and the procedure was done in the ER:  The length of the wound was approximately 10 cm. On exam after washout under local anesthesia, it appears to have just neck ('nicked\") both the prepatellar bursa and patellar tendon, the patella tendon remain mobile and was able to flex and extend the knee. The visible portion of the bursa was closed with 3 sutures chromic gut in in buried interrupted fashion. The proximally 1 cm laceration to the patellar ligament was likewise closed multiple sutures of 4 0 chromic gut in simple interrupted fashion.   Procedure was on 1/14.  Placed on Keflex 500 three times daily x 7 days. Last dose was 1/21.   She went to Urgent Care yesterday with concerns of infection was put on Bactrim, got IM Rocephin.    1/30:  Plan: we need to monitor her knee for infection.  Sutures out.  Continue antibiotics x 1 more week.  Follow up 1 week for wound check  Work note written. Off x 3 weeks more, at least.   Knee immobilizer x 1 more week    Ordered physical therapy    Wound " "re-dressed, knee immobilizer reapplied.   Change dressing daily.Present symptoms:   Much less pain x 4 days.  No fever/chills     2/13:  Physical therapy to start, wean out of braces.    Present symptoms:  Has had the occasional setback but overall improving  Doing physical therapy  Doesn't think she can do 12 hour shifts on feet.  No further redness.  Some burning pain   NO longer using knee immobilizer full time.    REVIEW OF SYSTEMS:    CONSTITUTIONAL:  NEGATIVE for fever, chills, change in weight  INTEGUMENTARY/SKIN:  NEGATIVE for worrisome rashes, moles or lesions  EYES:  NEGATIVE for vision changes or irritation  ENT/MOUTH:  NEGATIVE for ear, mouth and throat problems  RESP:  NEGATIVE for significant cough or SOB  BREAST:  NEGATIVE for masses, tenderness or discharge  CV:  NEGATIVE for chest pain, palpitations or peripheral edema  GI:  NEGATIVE for nausea, abdominal pain, heartburn, or change in bowel habits  :  Negative   MUSCULOSKELETAL:  See HPI above  NEURO:  NEGATIVE for weakness, dizziness or paresthesias  ENDOCRINE:  NEGATIVE for temperature intolerance, skin/hair changes  HEME/ALLERGY/IMMUNE:  NEGATIVE for bleeding problems  PSYCHIATRIC:  NEGATIVE for changes in mood or affect    PHYSICAL EXAM:  Pulse 84   Ht 1.67 m (5' 5.75\")   SpO2 97%   BMI 36.27 kg/m     GENERAL APPEARANCE: healthy, alert, and no distress     NEURO: Normal strength and tone, mentation intact, and speech normal  VASCULAR:   good pulses, and cappillary refill   LYMPH: no lymphadenopathy   PSYCH:  mentation appears normal and affect normal/bright  RESP: no increased work of breathing     KNEE EXAM:   SKIN: no suspicious lesions or rashes  Transverse wound,   Wound looks good, no erythema today. Wound healed.   Tender over medial proximal insertion of the patellar tendon.  Less puffiness of the prepatellar bursa  Alignment: normal   Patellofemoral joint: no extensor lag, with some pain against resistance.  Effusion: mild  ROM: " 0-95 with some pain.  Tender: over anterior knee     Impression: knee laceration, with partial patellar tendon laceration. Cellulitis of the knee resolved    Plan: no more bracing.  Continue Physical therapy, strengthening    Work: she has to be able to do 12 hour shifts. She can't do this for at least 4 more weeks. Could be longer.   She did an hour of cleaning last week at home, which caused significant pain.  She can't do her normal work.  No work x 4 more weeks. Hopefully she'll be able to return to full duty after that, but no guarantees.  There are no provisions at work for less hours or light duty.      ROMANA Lindo MD  Dept. Orthopedic Surgery  [facility]       Again, thank you for allowing me to participate in the care of your patient.        Sincerely,        Kevin Lindo MD    Electronically signed

## 2025-03-06 NOTE — PROGRESS NOTES
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 Cellulitis of the knee resolving-- laceration that occurred on 1/14.    I saw her 1/23, and the wound looked good. The next day she had siginficant cellulitis, and was admitted at the . Given a couple of days worth of Vanco, and discharged on Doxycycline. I don't think ID was consulted. Ortho not consulted.    HPI: Patient was out of town and took a fall and hit her knee right on the bone and she had to have surgery on her tendon and her bone was open to air so they started her on antibiotics.     ED note is available and the procedure was done in the ER:  The length of the wound was approximately 10 cm. On exam after washout under local anesthesia, it appears to have just neck ('nicked\") both the prepatellar bursa and patellar tendon, the patella tendon remain mobile and was able to flex and extend the knee. The visible portion of the bursa was closed with 3 sutures chromic gut in in buried interrupted fashion. The proximally 1 cm laceration to the patellar ligament was likewise closed multiple sutures of 4 0 chromic gut in simple interrupted fashion.   Procedure was on 1/14.  Placed on Keflex 500 three times daily x 7 days. Last dose was 1/21.   She went to Urgent Care yesterday with concerns of infection was put on Bactrim, got IM Rocephin.    1/30:  Plan: we need to monitor her knee for infection.  Sutures out.  Continue antibiotics x 1 more week.  Follow up 1 week for wound check  Work note written. Off x 3 weeks more, at least.   Knee immobilizer x 1 more week    Ordered physical therapy    Wound re-dressed, knee immobilizer reapplied.   Change dressing daily.Present symptoms:   Much less pain x 4 days.  No fever/chills     2/13:  Physical therapy to start, wean out of braces.    Present symptoms:  Has had the occasional " "setback but overall improving  Doing physical therapy  Doesn't think she can do 12 hour shifts on feet.  No further redness.  Some burning pain   NO longer using knee immobilizer full time.    REVIEW OF SYSTEMS:    CONSTITUTIONAL:  NEGATIVE for fever, chills, change in weight  INTEGUMENTARY/SKIN:  NEGATIVE for worrisome rashes, moles or lesions  EYES:  NEGATIVE for vision changes or irritation  ENT/MOUTH:  NEGATIVE for ear, mouth and throat problems  RESP:  NEGATIVE for significant cough or SOB  BREAST:  NEGATIVE for masses, tenderness or discharge  CV:  NEGATIVE for chest pain, palpitations or peripheral edema  GI:  NEGATIVE for nausea, abdominal pain, heartburn, or change in bowel habits  :  Negative   MUSCULOSKELETAL:  See HPI above  NEURO:  NEGATIVE for weakness, dizziness or paresthesias  ENDOCRINE:  NEGATIVE for temperature intolerance, skin/hair changes  HEME/ALLERGY/IMMUNE:  NEGATIVE for bleeding problems  PSYCHIATRIC:  NEGATIVE for changes in mood or affect    PHYSICAL EXAM:  Pulse 84   Ht 1.67 m (5' 5.75\")   SpO2 97%   BMI 36.27 kg/m     GENERAL APPEARANCE: healthy, alert, and no distress     NEURO: Normal strength and tone, mentation intact, and speech normal  VASCULAR:   good pulses, and cappillary refill   LYMPH: no lymphadenopathy   PSYCH:  mentation appears normal and affect normal/bright  RESP: no increased work of breathing     KNEE EXAM:   SKIN: no suspicious lesions or rashes  Transverse wound,   Wound looks good, no erythema today. Wound healed.   Tender over medial proximal insertion of the patellar tendon.  Less puffiness of the prepatellar bursa  Alignment: normal   Patellofemoral joint: no extensor lag, with some pain against resistance.  Effusion: mild  ROM: 0-95 with some pain.  Tender: over anterior knee     Impression: knee laceration, with partial patellar tendon laceration. Cellulitis of the knee resolved    Plan: no more bracing.  Continue Physical therapy, strengthening    Work: " she has to be able to do 12 hour shifts. She can't do this for at least 4 more weeks. Could be longer.   She did an hour of cleaning last week at home, which caused significant pain.  She can't do her normal work.  No work x 4 more weeks. Hopefully she'll be able to return to full duty after that, but no guarantees.  There are no provisions at work for less hours or light duty.      ROMANA Lindo MD  Dept. Orthopedic Surgery  [facility]

## 2025-03-06 NOTE — TELEPHONE ENCOUNTER
Pt was seen in clinic 03-06-25 for a follow up appt, filled out LALO and it was faxed to HIM dept along with [facility] request for records. Letter was written and given to pt.  Violeta Shelley CMA 3/6/2025 12:10 PM

## 2025-03-06 NOTE — LETTER
March 6, 2025      Mami Epps  [address]  [place] [address]        To Whom It May Concern:    Mami Epps  was seen on 3.6.25 in clinic.  Please excuse her  until 4.3.25 due to injury of the left knee.        Sincerely,        Kevin Lindo MD    Electronically signed

## 2025-03-11 ENCOUNTER — THERAPY VISIT (OUTPATIENT)
Dept: PHYSICAL THERAPY | Facility: CLINIC | Age: 50
End: 2025-03-11
Attending: ORTHOPAEDIC SURGERY
Payer: COMMERCIAL

## 2025-03-11 ENCOUNTER — MYC REFILL (OUTPATIENT)
Dept: FAMILY MEDICINE | Facility: CLINIC | Age: 50
End: 2025-03-11

## 2025-03-11 DIAGNOSIS — M25.562 ACUTE PAIN OF LEFT KNEE: ICD-10-CM

## 2025-03-11 DIAGNOSIS — S81.011D LACERATION OF RIGHT KNEE WITH TENDON INVOLVEMENT, SUBSEQUENT ENCOUNTER: Primary | ICD-10-CM

## 2025-03-11 DIAGNOSIS — F33.0 MILD EPISODE OF RECURRENT MAJOR DEPRESSIVE DISORDER: Primary | ICD-10-CM

## 2025-03-11 DIAGNOSIS — S86.921D LACERATION OF RIGHT KNEE WITH TENDON INVOLVEMENT, SUBSEQUENT ENCOUNTER: Primary | ICD-10-CM

## 2025-03-11 PROCEDURE — 97110 THERAPEUTIC EXERCISES: CPT | Mod: GP

## 2025-03-11 PROCEDURE — 97140 MANUAL THERAPY 1/> REGIONS: CPT | Mod: GP

## 2025-03-11 RX ORDER — VILAZODONE HYDROCHLORIDE 40 MG/1
40 TABLET ORAL DAILY
Qty: 90 TABLET | Refills: 3 | Status: SHIPPED | OUTPATIENT
Start: 2025-03-11

## 2025-03-18 ENCOUNTER — THERAPY VISIT (OUTPATIENT)
Dept: PHYSICAL THERAPY | Facility: CLINIC | Age: 50
End: 2025-03-18
Payer: COMMERCIAL

## 2025-03-18 DIAGNOSIS — S81.011D LACERATION OF RIGHT KNEE WITH TENDON INVOLVEMENT, SUBSEQUENT ENCOUNTER: Primary | ICD-10-CM

## 2025-03-18 DIAGNOSIS — S86.921D LACERATION OF RIGHT KNEE WITH TENDON INVOLVEMENT, SUBSEQUENT ENCOUNTER: Primary | ICD-10-CM

## 2025-03-18 DIAGNOSIS — M25.562 ACUTE PAIN OF LEFT KNEE: ICD-10-CM

## 2025-03-18 PROCEDURE — 97110 THERAPEUTIC EXERCISES: CPT | Mod: GP

## 2025-03-18 PROCEDURE — 97140 MANUAL THERAPY 1/> REGIONS: CPT | Mod: GP

## 2025-03-27 ENCOUNTER — PATIENT OUTREACH (OUTPATIENT)
Dept: ADMINISTRATIVE | Facility: HOSPITAL | Age: 50
End: 2025-03-27
Payer: COMMERCIAL

## 2025-03-27 ENCOUNTER — THERAPY VISIT (OUTPATIENT)
Dept: PHYSICAL THERAPY | Facility: CLINIC | Age: 50
End: 2025-03-27
Payer: COMMERCIAL

## 2025-03-27 DIAGNOSIS — M25.562 ACUTE PAIN OF LEFT KNEE: ICD-10-CM

## 2025-03-27 DIAGNOSIS — S81.011D LACERATION OF RIGHT KNEE WITH TENDON INVOLVEMENT, SUBSEQUENT ENCOUNTER: Primary | ICD-10-CM

## 2025-03-27 DIAGNOSIS — S86.921D LACERATION OF RIGHT KNEE WITH TENDON INVOLVEMENT, SUBSEQUENT ENCOUNTER: Primary | ICD-10-CM

## 2025-03-27 NOTE — PROGRESS NOTES
03/27/2025  VB chart audit performed. Care Everywhere updates requested and reviewed  Overdue HM topic chart audit and/or requested. LINKS triggered and reconciled. Media reviewed Lvm/portal sent regarding overdue health topics

## 2025-04-01 ASSESSMENT — KOOS JR
RISING FROM SITTING: MILD
BENDING TO THE FLOOR TO PICK UP OBJECT: MODERATE
TWISING OR PIVOTING ON KNEE: MILD
STRAIGHTENING KNEE FULLY: MILD
GOING UP OR DOWN STAIRS: MILD
STANDING UPRIGHT: MILD
HOW SEVERE IS YOUR KNEE STIFFNESS AFTER FIRST WAKING IN MORNING: MILD
KOOS JR SCORING: 65.99

## 2025-04-02 NOTE — PROGRESS NOTES
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 It's been approximately 3 months since her injury,      HPI: Patient was out of town and took a fall and hit her knee right on the bone and she had to have surgery on her tendon and her bone was open to air so they started her on antibiotics.     Present symptoms: walking much better. Some tightness in the quads  Some patellar tendon pain after hard exercises  Wants to go back to work   Riding her bike.        Review of Systems:  Constitutional/General: Negative for fever, chills, change in weight  Integumentary/Skin: Negative for worrisome rashes, moles, or lesions  Neuro: Negative for weakness, dizziness, or paresthesias   Psychiatric: negative for changes in mood or affect    OBJECTIVE:  Physical Exam:  Ht 1.67 m (5' 5.75\")   Wt 97.1 kg (214 lb)   BMI 34.80 kg/m    General Appearance: healthy, alert and no distress   Skin: no suspicious lesions or rashes  Neuro: Normal strength and tone, mentation intact and speech normal  Vascular: good pulses, and capillary refill   Lymph: no lymphadenopathy   Psych:  mentation appears normal and affect normal/bright  Resp: no increased work of breathing    Left Lower Extremity Exam:  Inspection: no effusion      ROM: full!   Tender: still over medial patellar tendon proximal   Strength: very good. Still some pain with resisted knee extension.     ASSESSMENT:   Encounter Diagnosis   Name Primary?    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  Work note written  Return to clinic: as needed     ROMANA Lindo MD  Dept. Orthopedic Surgery  [facility]       "

## 2025-04-03 ENCOUNTER — THERAPY VISIT (OUTPATIENT)
Dept: PHYSICAL THERAPY | Facility: CLINIC | Age: 50
End: 2025-04-03
Payer: COMMERCIAL

## 2025-04-03 ENCOUNTER — MYC MEDICAL ADVICE (OUTPATIENT)
Dept: ORTHOPEDICS | Facility: CLINIC | Age: 50
End: 2025-04-03

## 2025-04-03 ENCOUNTER — OFFICE VISIT (OUTPATIENT)
Dept: ORTHOPEDICS | Facility: CLINIC | Age: 50
End: 2025-04-03
Payer: COMMERCIAL

## 2025-04-03 VITALS — BODY MASS INDEX: 34.39 KG/M2 | HEIGHT: 66 IN | WEIGHT: 214 LBS

## 2025-04-03 DIAGNOSIS — M25.562 ACUTE PAIN OF LEFT KNEE: ICD-10-CM

## 2025-04-03 DIAGNOSIS — S81.011D LACERATION OF RIGHT KNEE WITH TENDON INVOLVEMENT, SUBSEQUENT ENCOUNTER: Primary | ICD-10-CM

## 2025-04-03 DIAGNOSIS — S86.921D LACERATION OF RIGHT KNEE WITH TENDON INVOLVEMENT, SUBSEQUENT ENCOUNTER: Primary | ICD-10-CM

## 2025-04-03 ASSESSMENT — PAIN SCALES - GENERAL: PAINLEVEL_OUTOF10: MILD PAIN (1)

## 2025-04-03 NOTE — LETTER
"4/3/2025      Mami Epps  [address]  [place] [address]      Dear Colleague,    Thank you for referring your patient, Mami Epps, to the [facility]. Please see a copy of my visit note below.    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 It's been approximately 3 months since her injury,      HPI: Patient was out of town and took a fall and hit her knee right on the bone and she had to have surgery on her tendon and her bone was open to air so they started her on antibiotics.     Present symptoms: walking much better. Some tightness in the quads  Some patellar tendon pain after hard exercises  Wants to go back to work   Riding her bike.        Review of Systems:  Constitutional/General: Negative for fever, chills, change in weight  Integumentary/Skin: Negative for worrisome rashes, moles, or lesions  Neuro: Negative for weakness, dizziness, or paresthesias   Psychiatric: negative for changes in mood or affect    OBJECTIVE:  Physical Exam:  Ht 1.67 m (5' 5.75\")   Wt 97.1 kg (214 lb)   BMI 34.80 kg/m    General Appearance: healthy, alert and no distress   Skin: no suspicious lesions or rashes  Neuro: Normal strength and tone, mentation intact and speech normal  Vascular: good pulses, and capillary refill   Lymph: no lymphadenopathy   Psych:  mentation appears normal and affect normal/bright  Resp: no increased work of breathing    Left Lower Extremity Exam:  Inspection: no effusion      ROM: full!   Tender: still over medial patellar tendon proximal   Strength: very good. Still some pain with resisted knee extension.     ASSESSMENT:   Encounter Diagnosis   Name Primary?     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  Work note written  Return to clinic: as needed     ROMANA Lindo MD  Dept. Orthopedic Surgery  [facility]         Again, thank you for allowing me to participate in the care of your patient.        Sincerely,        Kevin Lindo MD    Electronically signed

## 2025-04-03 NOTE — TELEPHONE ENCOUNTER
Faxed last two office notes to [facility] and sent a [employer] message.  April [place] CMA 4/3/2025 1:29 PM

## 2025-04-29 ENCOUNTER — THERAPY VISIT (OUTPATIENT)
Dept: PHYSICAL THERAPY | Facility: CLINIC | Age: 50
End: 2025-04-29
Payer: COMMERCIAL

## 2025-04-29 DIAGNOSIS — S86.921D LACERATION OF RIGHT KNEE WITH TENDON INVOLVEMENT, SUBSEQUENT ENCOUNTER: ICD-10-CM

## 2025-04-29 DIAGNOSIS — M25.562 ACUTE PAIN OF LEFT KNEE: Primary | ICD-10-CM

## 2025-04-29 DIAGNOSIS — S81.011D LACERATION OF RIGHT KNEE WITH TENDON INVOLVEMENT, SUBSEQUENT ENCOUNTER: ICD-10-CM

## 2025-04-29 PROCEDURE — 97110 THERAPEUTIC EXERCISES: CPT | Mod: GP

## 2025-04-29 PROCEDURE — 97140 MANUAL THERAPY 1/> REGIONS: CPT | Mod: GP

## 2025-04-30 ENCOUNTER — PATIENT OUTREACH (OUTPATIENT)
Dept: CARE COORDINATION | Facility: CLINIC | Age: 50
End: 2025-04-30
Payer: COMMERCIAL

## 2025-05-07 ENCOUNTER — THERAPY VISIT (OUTPATIENT)
Dept: PHYSICAL THERAPY | Facility: CLINIC | Age: 50
End: 2025-05-07
Payer: COMMERCIAL

## 2025-05-07 DIAGNOSIS — M25.562 ACUTE PAIN OF LEFT KNEE: Primary | ICD-10-CM

## 2025-05-07 DIAGNOSIS — S86.921D LACERATION OF RIGHT KNEE WITH TENDON INVOLVEMENT, SUBSEQUENT ENCOUNTER: ICD-10-CM

## 2025-05-07 DIAGNOSIS — S81.011D LACERATION OF RIGHT KNEE WITH TENDON INVOLVEMENT, SUBSEQUENT ENCOUNTER: ICD-10-CM

## 2025-05-07 PROCEDURE — 97110 THERAPEUTIC EXERCISES: CPT | Mod: GP

## 2025-05-07 PROCEDURE — 97140 MANUAL THERAPY 1/> REGIONS: CPT | Mod: GP

## 2025-05-19 ENCOUNTER — RESULTS FOLLOW-UP (OUTPATIENT)
Dept: FAMILY MEDICINE | Facility: CLINIC | Age: 50
End: 2025-05-19

## 2025-05-19 ENCOUNTER — OFFICE VISIT (OUTPATIENT)
Dept: FAMILY MEDICINE | Facility: CLINIC | Age: 50
End: 2025-05-19
Attending: STUDENT IN AN ORGANIZED HEALTH CARE EDUCATION/TRAINING PROGRAM
Payer: COMMERCIAL

## 2025-05-19 VITALS
TEMPERATURE: 98.1 F | RESPIRATION RATE: 18 BRPM | HEIGHT: 66 IN | OXYGEN SATURATION: 97 % | SYSTOLIC BLOOD PRESSURE: 104 MMHG | HEART RATE: 68 BPM | DIASTOLIC BLOOD PRESSURE: 62 MMHG | BODY MASS INDEX: 33.75 KG/M2 | WEIGHT: 210 LBS

## 2025-05-19 DIAGNOSIS — Z13.220 SCREENING FOR HYPERLIPIDEMIA: ICD-10-CM

## 2025-05-19 DIAGNOSIS — Z13.29 SCREENING FOR THYROID DISORDER: ICD-10-CM

## 2025-05-19 DIAGNOSIS — Z12.31 ENCOUNTER FOR SCREENING MAMMOGRAM FOR BREAST CANCER: ICD-10-CM

## 2025-05-19 DIAGNOSIS — R30.0 DYSURIA: ICD-10-CM

## 2025-05-19 DIAGNOSIS — Z00.00 ROUTINE GENERAL MEDICAL EXAMINATION AT A HEALTH CARE FACILITY: Primary | ICD-10-CM

## 2025-05-19 DIAGNOSIS — F33.0 MILD EPISODE OF RECURRENT MAJOR DEPRESSIVE DISORDER: ICD-10-CM

## 2025-05-19 DIAGNOSIS — Z13.1 SCREENING FOR DIABETES MELLITUS: ICD-10-CM

## 2025-05-19 DIAGNOSIS — E66.09 CLASS 1 OBESITY DUE TO EXCESS CALORIES WITHOUT SERIOUS COMORBIDITY WITH BODY MASS INDEX (BMI) OF 33.0 TO 33.9 IN ADULT: ICD-10-CM

## 2025-05-19 DIAGNOSIS — Z71.3 ENCOUNTER FOR WEIGHT LOSS COUNSELING: ICD-10-CM

## 2025-05-19 DIAGNOSIS — E66.811 CLASS 1 OBESITY DUE TO EXCESS CALORIES WITHOUT SERIOUS COMORBIDITY WITH BODY MASS INDEX (BMI) OF 33.0 TO 33.9 IN ADULT: ICD-10-CM

## 2025-05-19 DIAGNOSIS — J45.22 MILD INTERMITTENT ASTHMA WITH STATUS ASTHMATICUS: ICD-10-CM

## 2025-05-19 LAB
ALBUMIN SERPL BCG-MCNC: 4.3 G/DL (ref 3.5–5.2)
ALBUMIN UR-MCNC: NEGATIVE MG/DL
ALP SERPL-CCNC: 76 U/L (ref 40–150)
ALT SERPL W P-5'-P-CCNC: 18 U/L (ref 0–50)
APPEARANCE UR: CLEAR
AST SERPL W P-5'-P-CCNC: 23 U/L (ref 0–45)
BACTERIA #/AREA URNS HPF: ABNORMAL /HPF
BILIRUB SERPL-MCNC: 0.3 MG/DL
BILIRUB UR QL STRIP: NEGATIVE
BILIRUBIN DIRECT (ROCHE PRO & PURE): 0.12 MG/DL (ref 0–0.45)
CHOLEST SERPL-MCNC: 154 MG/DL
CLUE CELLS: ABNORMAL
COLOR UR AUTO: YELLOW
EST. AVERAGE GLUCOSE BLD GHB EST-MCNC: 111 MG/DL
FASTING STATUS PATIENT QL REPORTED: ABNORMAL
GLUCOSE UR STRIP-MCNC: NEGATIVE MG/DL
HBA1C MFR BLD: 5.5 % (ref 0–5.6)
HDLC SERPL-MCNC: 46 MG/DL
HGB UR QL STRIP: ABNORMAL
KETONES UR STRIP-MCNC: NEGATIVE MG/DL
LDLC SERPL CALC-MCNC: 94 MG/DL
LEUKOCYTE ESTERASE UR QL STRIP: NEGATIVE
NITRATE UR QL: NEGATIVE
NONHDLC SERPL-MCNC: 108 MG/DL
PH UR STRIP: 6 [PH] (ref 5–7)
PROT SERPL-MCNC: 7 G/DL (ref 6.4–8.3)
RBC #/AREA URNS AUTO: ABNORMAL /HPF
SP GR UR STRIP: 1.02 (ref 1–1.03)
SQUAMOUS #/AREA URNS AUTO: ABNORMAL /LPF
TRICHOMONAS, WET PREP: ABNORMAL
TRIGL SERPL-MCNC: 72 MG/DL
TSH SERPL DL<=0.005 MIU/L-ACNC: 1.19 UIU/ML (ref 0.3–4.2)
UROBILINOGEN UR STRIP-ACNC: 0.2 E.U./DL
VIT D+METAB SERPL-MCNC: 43 NG/ML (ref 20–50)
WBC #/AREA URNS AUTO: ABNORMAL /HPF
WBC'S/HIGH POWER FIELD, WET PREP: ABNORMAL
YEAST, WET PREP: ABNORMAL

## 2025-05-19 PROCEDURE — 90677 PCV20 VACCINE IM: CPT | Performed by: STUDENT IN AN ORGANIZED HEALTH CARE EDUCATION/TRAINING PROGRAM

## 2025-05-19 PROCEDURE — 36415 COLL VENOUS BLD VENIPUNCTURE: CPT | Performed by: STUDENT IN AN ORGANIZED HEALTH CARE EDUCATION/TRAINING PROGRAM

## 2025-05-19 PROCEDURE — 80076 HEPATIC FUNCTION PANEL: CPT | Performed by: STUDENT IN AN ORGANIZED HEALTH CARE EDUCATION/TRAINING PROGRAM

## 2025-05-19 PROCEDURE — 84443 ASSAY THYROID STIM HORMONE: CPT | Performed by: STUDENT IN AN ORGANIZED HEALTH CARE EDUCATION/TRAINING PROGRAM

## 2025-05-19 PROCEDURE — 87210 SMEAR WET MOUNT SALINE/INK: CPT | Performed by: STUDENT IN AN ORGANIZED HEALTH CARE EDUCATION/TRAINING PROGRAM

## 2025-05-19 PROCEDURE — 82306 VITAMIN D 25 HYDROXY: CPT | Performed by: STUDENT IN AN ORGANIZED HEALTH CARE EDUCATION/TRAINING PROGRAM

## 2025-05-19 PROCEDURE — 81001 URINALYSIS AUTO W/SCOPE: CPT | Performed by: STUDENT IN AN ORGANIZED HEALTH CARE EDUCATION/TRAINING PROGRAM

## 2025-05-19 PROCEDURE — 80061 LIPID PANEL: CPT | Performed by: STUDENT IN AN ORGANIZED HEALTH CARE EDUCATION/TRAINING PROGRAM

## 2025-05-19 PROCEDURE — 90471 IMMUNIZATION ADMIN: CPT | Performed by: STUDENT IN AN ORGANIZED HEALTH CARE EDUCATION/TRAINING PROGRAM

## 2025-05-19 PROCEDURE — G2211 COMPLEX E/M VISIT ADD ON: HCPCS | Performed by: STUDENT IN AN ORGANIZED HEALTH CARE EDUCATION/TRAINING PROGRAM

## 2025-05-19 PROCEDURE — 99396 PREV VISIT EST AGE 40-64: CPT | Mod: 25 | Performed by: STUDENT IN AN ORGANIZED HEALTH CARE EDUCATION/TRAINING PROGRAM

## 2025-05-19 PROCEDURE — 83036 HEMOGLOBIN GLYCOSYLATED A1C: CPT | Performed by: STUDENT IN AN ORGANIZED HEALTH CARE EDUCATION/TRAINING PROGRAM

## 2025-05-19 PROCEDURE — 3074F SYST BP LT 130 MM HG: CPT | Performed by: STUDENT IN AN ORGANIZED HEALTH CARE EDUCATION/TRAINING PROGRAM

## 2025-05-19 PROCEDURE — 3078F DIAST BP <80 MM HG: CPT | Performed by: STUDENT IN AN ORGANIZED HEALTH CARE EDUCATION/TRAINING PROGRAM

## 2025-05-19 PROCEDURE — 99214 OFFICE O/P EST MOD 30 MIN: CPT | Mod: 25 | Performed by: STUDENT IN AN ORGANIZED HEALTH CARE EDUCATION/TRAINING PROGRAM

## 2025-05-19 RX ORDER — NITROFURANTOIN 25; 75 MG/1; MG/1
100 CAPSULE ORAL 2 TIMES DAILY
Qty: 10 CAPSULE | Refills: 0 | Status: SHIPPED | OUTPATIENT
Start: 2025-05-19 | End: 2025-05-24

## 2025-05-19 RX ORDER — FLUTICASONE PROPIONATE AND SALMETEROL 250; 50 UG/1; UG/1
1 POWDER RESPIRATORY (INHALATION) EVERY 12 HOURS
Qty: 1 EACH | Refills: 2 | Status: SHIPPED | OUTPATIENT
Start: 2025-05-19

## 2025-05-19 SDOH — HEALTH STABILITY: PHYSICAL HEALTH: ON AVERAGE, HOW MANY DAYS PER WEEK DO YOU ENGAGE IN MODERATE TO STRENUOUS EXERCISE (LIKE A BRISK WALK)?: 2 DAYS

## 2025-05-19 ASSESSMENT — SOCIAL DETERMINANTS OF HEALTH (SDOH): HOW OFTEN DO YOU GET TOGETHER WITH FRIENDS OR RELATIVES?: TWICE A WEEK

## 2025-05-19 ASSESSMENT — PATIENT HEALTH QUESTIONNAIRE - PHQ9: SUM OF ALL RESPONSES TO PHQ QUESTIONS 1-9: 2

## 2025-05-19 NOTE — PROGRESS NOTES
"Preventive Care Visit  [facility] ARANZA Srinivasan MD, Family Medicine  May 19, 2025      Assessment & Plan     Routine general medical examination at a health care facility    - Adult Dermatology  Referral; Future  - Hepatic panel (Albumin, ALT, AST, Bili, Alk Phos, TP); Future  - Vitamin D Deficiency; Future    Dysuria  uncontrolled  - UA Macroscopic with reflex to Microscopic and Culture - Clinic Collect  - Wet prep - lab collect; Future  - start nitroFURantoin macrocrystal-monohydrate (MACROBID) 100 MG capsule; Take 1 capsule (100 mg) by mouth 2 times daily for 5 days empirically.    Mild intermittent asthma with status asthmaticus  Uncontrolled.  Start Advair and Zyrtec non-smoker  - fluticasone-salmeterol (ADVAIR) 250-50 MCG/ACT inhaler; Inhale 1 puff into the lungs every 12 hours.    Encounter for weight loss counseling    - Pneumococcal 20 Valent Conjugate (PCV20)    Screening for hyperlipidemia    - Lipid panel reflex to direct LDL Fasting; Future    Screening for diabetes mellitus    - Hemoglobin A1c; Future    Screening for thyroid disorder    - TSH with free T4 reflex; Future    Encounter for screening mammogram for breast cancer    - MA Screen Bilateral w/Rajat; Future  Non smoker  obesity  Significantly improved.  Continue  lifestyle modification  Mild depression  Continue 20 mg of Vilazodone.    We will request medical record form [facility].\    The longitudinal plan of care for the diagnosis(es)/condition(s) as documented were addressed during this visit. Due to the added complexity in care, I will continue to support Mami in the subsequent management and with ongoing continuity of care.  BMI  Estimated body mass index is 33.89 kg/m  as calculated from the following:    Height as of this encounter: 1.676 m (5' 6\").    Weight as of this encounter: 95.3 kg (210 lb).   Weight management plan: Discussed healthy diet and exercise " guidelines    Counseling  Appropriate preventive services were addressed with this patient via screening, questionnaire, or discussion as appropriate for fall prevention, nutrition, physical activity, Tobacco-use cessation, social engagement, weight loss and cognition.  Checklist reviewing preventive services available has been given to the patient.  Reviewed patient's diet, addressing concerns and/or questions.   She is at risk for lack of exercise and has been provided with information to increase physical activity for the benefit of her well-being.           Krystle Bolaños is a 50 year old, presenting for the following:  Physical        5/19/2025     9:07 AM   Additional Questions   Roomed by Tenisha PALACIOS         5/19/2025     9:07 AM   Patient Reported Additional Medications   Patient reports taking the following new medications None          HPI  Patient with a history of asthma, currently managed with albuterol, reports increased use of the inhaler recently. She recently underwent allergy testing, which revealed a dog allergy; her dog currently sleeps in her bed.    She underwent a hysterectomy a few years ago, but it is unclear whether the cervix was removed during the procedure. A request for follow-up records from the hospital where the surgery was performed has been made, but no response has been received to date.    She has done so well with weight loss. She continues to eat healthy and exercise. She is no longer on GLP-1 agonist.  She is currently in support group for binge eating which is helping greatly.  She also reports experiencing dysuria.  She is a non smoker.  She was previously on 40 mg of vilazodone, but she has decreased it to 20 mg as she is doing well.  Advance Care Planning    Document on file is a Health Care Directive or POLST.        5/19/2025   General Health   How would you rate your overall physical health? Good   Feel stress (tense, anxious, or unable to sleep) Not at all          5/19/2025   Nutrition   Three or more servings of calcium each day? Yes   Diet: Regular (no restrictions)   How many servings of fruit and vegetables per day? (!) 2-3   How many sweetened beverages each day? 0-1         5/19/2025   Exercise   Days per week of moderate/strenous exercise 2 days   (!) EXERCISE CONCERN      5/19/2025   Social Factors   Frequency of gathering with friends or relatives Twice a week   Worry food won't last until get money to buy more No   Food not last or not have enough money for food? No   Do you have housing? (Housing is defined as stable permanent housing and does not include staying outside in a car, in a tent, in an abandoned building, in an overnight shelter, or couch-surfing.) Yes   Are you worried about losing your housing? No   Lack of transportation? No   Unable to get utilities (heat,electricity)? No         5/19/2025   Fall Risk   Fallen 2 or more times in the past year? Yes   Trouble with walking or balance? No   Gait Speed Test (Document in seconds) 2   Gait Speed Test Interpretation Less than or equal to 5.00 seconds - PASS          5/19/2025   Dental   Dentist two times every year? Yes         Today's PHQ-9 Score:       11/26/2024     8:40 AM   PHQ-9 SCORE   PHQ-9 Total Score MyChart 0   PHQ-9 Total Score 0        Patient-reported           5/19/2025   Substance Use   Alcohol more than 3/day or more than 7/wk Not Applicable   Do you use any other substances recreationally? No     Social History     Tobacco Use    Smoking status: Never     Passive exposure: Never    Smokeless tobacco: Never   Vaping Use    Vaping status: Never Used   Substance Use Topics    Alcohol use: Not Currently    Drug use: Never           8/30/2024   LAST FHS-7 RESULTS   1st degree relative breast or ovarian cancer No   Any relative bilateral breast cancer No   Any male have breast cancer No   Any ONE woman have BOTH breast AND ovarian cancer No   Any woman with breast cancer before 50yrs No   2 or  "more relatives with breast AND/OR ovarian cancer No   2 or more relatives with breast AND/OR bowel cancer No       Mammogram Screening - Mammogram every 1-2 years updated in Health Maintenance based on mutual decision making        5/19/2025   STI Screening   New sexual partner(s) since last STI/HIV test? No     History of abnormal Pap smear: No - age 30-64 HPV with reflex Pap every 5 years recommended        Latest Ref Rng & Units 5/17/2024     9:27 AM   PAP / HPV   PAP  Negative for Intraepithelial Lesion or Malignancy (NILM)    HPV 16 DNA Negative Negative    HPV 18 DNA Negative Negative    Other HR HPV Negative Negative      ASCVD Risk   The 10-year ASCVD risk score (Kirt PERSAUD, et al., 2019) is: 0.7%    Values used to calculate the score:      Age: 50 years      Sex: Female      Is Non- : No      Diabetic: No      Tobacco smoker: No      Systolic Blood Pressure: 104 mmHg      Is BP treated: No      HDL Cholesterol: 49 mg/dL      Total Cholesterol: 139 mg/dL          Reviewed and updated as needed this visit by Provider                    Past Medical History:   Diagnosis Date    Generalized anxiety disorder     History of basal cell carcinoma     Major depression     STEVE (obstructive sleep apnea)      Past Surgical History:   Procedure Laterality Date    DISCECTOMY LUMBAR ANTERIOR      HYSTERECTOMY, PAP NO LONGER INDICATED       OB History   No obstetric history on file.     Lab work is in process  Labs reviewed in EPIC      Review of Systems  Constitutional, HEENT, cardiovascular, pulmonary, GI, , musculoskeletal, neuro, skin, endocrine and psych systems are negative, except as otherwise noted.     Objective    Exam  /62   Pulse 68   Temp 98.1  F (36.7  C) (Oral)   Resp 18   Ht 1.676 m (5' 6\")   Wt 95.3 kg (210 lb)   SpO2 97%   BMI 33.89 kg/m     Estimated body mass index is 33.89 kg/m  as calculated from the following:    Height as of this encounter: 1.676 m (5' " "6\").    Weight as of this encounter: 95.3 kg (210 lb).    Physical Exam  GENERAL: alert and no distress  EYES: Eyes grossly normal to inspection, PERRL and conjunctivae and sclerae normal  HENT: ear canals and TM's normal, nose and mouth without ulcers or lesions  RESP: lungs clear to auscultation - no rales, rhonchi or wheezes  CV: regular rate and rhythm, normal S1 S2, no S3 or S4, no murmur, click or rub,   ABDOMEN: soft, nontender, no hepatosplenomegaly, no masses and bowel sounds normal  MS: no gross musculoskeletal defects noted, no edema  SKIN: no suspicious lesions or rashes  NEURO: Normal strength and tone, mentation intact and speech normal  PSYCH: mentation appears normal, affect normal/bright        Signed Electronically by: Lorraine Srinivasan MD    "

## 2025-05-19 NOTE — PATIENT INSTRUCTIONS
Patient Education   Preventive Care Advice   This is general advice given by our system to help you stay healthy. However, your care team may have specific advice just for you. Please talk to your care team about your preventive care needs.  Nutrition  Eat 5 or more servings of fruits and vegetables each day.  Try wheat bread, brown rice and whole grain pasta (instead of white bread, rice, and pasta).  Get enough calcium and vitamin D. Check the label on foods and aim for 100% of the RDA (recommended daily allowance).  Lifestyle  Exercise at least 150 minutes each week  (30 minutes a day, 5 days a week).  Do muscle strengthening activities 2 days a week. These help control your weight and prevent disease.  No smoking.  Wear sunscreen to prevent skin cancer.  Have a dental exam and cleaning every 6 months.  Yearly exams  See your health care team every year to talk about:  Any changes in your health.  Any medicines your care team has prescribed.  Preventive care, family planning, and ways to prevent chronic diseases.  Shots (vaccines)   HPV shots (up to age 26), if you've never had them before.  Hepatitis B shots (up to age 59), if you've never had them before.  COVID-19 shot: Get this shot when it's due.  Flu shot: Get a flu shot every year.  Tetanus shot: Get a tetanus shot every 10 years.  Pneumococcal, hepatitis A, and RSV shots: Ask your care team if you need these based on your risk.  Shingles shot (for age 50 and up)  General health tests  Diabetes screening:  Starting at age 35, Get screened for diabetes at least every 3 years.  If you are younger than age 35, ask your care team if you should be screened for diabetes.  Cholesterol test: At age 39, start having a cholesterol test every 5 years, or more often if advised.  Bone density scan (DEXA): At age 50, ask your care team if you should have this scan for osteoporosis (brittle bones).  Hepatitis C: Get tested at least once in your life.  STIs (sexually  transmitted infections)  Before age 24: Ask your care team if you should be screened for STIs.  After age 24: Get screened for STIs if you're at risk. You are at risk for STIs (including HIV) if:  You are sexually active with more than one person.  You don't use condoms every time.  You or a partner was diagnosed with a sexually transmitted infection.  If you are at risk for HIV, ask about PrEP medicine to prevent HIV.  Get tested for HIV at least once in your life, whether you are at risk for HIV or not.  Cancer screening tests  Cervical cancer screening: If you have a cervix, begin getting regular cervical cancer screening tests starting at age 21.  Breast cancer scan (mammogram): If you've ever had breasts, begin having regular mammograms starting at age 40. This is a scan to check for breast cancer.  Colon cancer screening: It is important to start screening for colon cancer at age 45.  Have a colonoscopy test every 10 years (or more often if you're at risk) Or, ask your provider about stool tests like a FIT test every year or Cologuard test every 3 years.  To learn more about your testing options, visit:   .  For help making a decision, visit:   https://bit.ly/ka59962.  Prostate cancer screening test: If you have a prostate, ask your care team if a prostate cancer screening test (PSA) at age 55 is right for you.  Lung cancer screening: If you are a current or former smoker ages 50 to 80, ask your care team if ongoing lung cancer screenings are right for you.  For informational purposes only. Not to replace the advice of your health care provider. Copyright   2023 [facility]. All rights reserved. Clinically reviewed by the [facility] Transitions Program. [employer] 172800 - REV 01/24.  Preventing Falls: Care Instructions  Injuries and health problems such as trouble walking or poor eyesight can increase your risk of falling. So can some medicines. But there are things you can do to help  "prevent falls. You can exercise to get stronger. You can also arrange your home to make it safer.    Talk to your doctor about the medicines you take. Ask if any of them increase the risk of falls and whether they can be changed or stopped.   Try to exercise regularly. It can help improve your strength and balance. This can help lower your risk of falling.         Practice fall safety and prevention.   Wear low-heeled shoes that fit well and give your feet good support. Talk to your doctor if you have foot problems that make this hard.  Carry a cellphone or wear a medical alert device that you can use to call for help.  Use stepladders instead of chairs to reach high objects. Don't climb if you're at risk for falls. Ask for help, if needed.  Wear the correct eyeglasses, if you need them.        Make your home safer.   Remove rugs, cords, clutter, and furniture from walkways.  Keep your house well lit. Use night-lights in hallways and bathrooms.  Install and use sturdy handrails on stairways.  Wear nonskid footwear, even inside. Don't walk barefoot or in socks without shoes.        Be safe outside.   Use handrails, curb cuts, and ramps whenever possible.  Keep your hands free by using a shoulder bag or backpack.  Try to walk in well-lit areas. Watch out for uneven ground, changes in pavement, and debris.  Be careful in the winter. Walk on the grass or gravel when sidewalks are slippery. Use de-icer on steps and walkways. Add non-slip devices to shoes.    Put grab bars and nonskid mats in your shower or tub and near the toilet. Try to use a shower chair or bath bench when bathing.   Get into a tub or shower by putting in your weaker leg first. Get out with your strong side first. Have a phone or medical alert device in the bathroom with you.   Where can you learn more?  Go to https://www.[employer].net/patiented  Enter G117 in the search box to learn more about \"Preventing Falls: Care Instructions.\"  Current as of: " July 31, 2024  Content Version: 14.4    2230-1311 [employer].   Care instructions adapted under license by your healthcare professional. If you have questions about a medical condition or this instruction, always ask your healthcare professional. [employer] disclaims any warranty or liability for your use of this information.

## 2025-05-20 ENCOUNTER — PATIENT OUTREACH (OUTPATIENT)
Dept: CARE COORDINATION | Facility: CLINIC | Age: 50
End: 2025-05-20

## 2025-05-22 ENCOUNTER — PATIENT OUTREACH (OUTPATIENT)
Dept: CARE COORDINATION | Facility: CLINIC | Age: 50
End: 2025-05-22
Payer: COMMERCIAL